# Patient Record
Sex: MALE | Race: WHITE | Employment: OTHER | URBAN - NONMETROPOLITAN AREA
[De-identification: names, ages, dates, MRNs, and addresses within clinical notes are randomized per-mention and may not be internally consistent; named-entity substitution may affect disease eponyms.]

---

## 2017-01-25 ENCOUNTER — OFFICE VISIT (OUTPATIENT)
Dept: PRIMARY CARE CLINIC | Age: 72
End: 2017-01-25
Payer: MEDICARE

## 2017-01-25 VITALS
TEMPERATURE: 97.4 F | WEIGHT: 197.87 LBS | SYSTOLIC BLOOD PRESSURE: 136 MMHG | BODY MASS INDEX: 26.22 KG/M2 | HEIGHT: 73 IN | OXYGEN SATURATION: 97 % | DIASTOLIC BLOOD PRESSURE: 86 MMHG | HEART RATE: 78 BPM

## 2017-01-25 DIAGNOSIS — M25.551 BILATERAL HIP PAIN: ICD-10-CM

## 2017-01-25 DIAGNOSIS — Z00.00 VISIT FOR PREVENTIVE HEALTH EXAMINATION: Primary | ICD-10-CM

## 2017-01-25 DIAGNOSIS — Z86.010 ENCOUNTER FOR COLONOSCOPY DUE TO HISTORY OF ADENOMATOUS COLONIC POLYPS: ICD-10-CM

## 2017-01-25 DIAGNOSIS — Z23 NEED FOR INFLUENZA VACCINATION: ICD-10-CM

## 2017-01-25 DIAGNOSIS — N40.1 BENIGN PROSTATIC HYPERPLASIA WITH LOWER URINARY TRACT SYMPTOMS, UNSPECIFIED MORPHOLOGY: ICD-10-CM

## 2017-01-25 DIAGNOSIS — Z23 NEED FOR SHINGLES VACCINE: ICD-10-CM

## 2017-01-25 DIAGNOSIS — Z11.59 NEED FOR HEPATITIS C SCREENING TEST: ICD-10-CM

## 2017-01-25 DIAGNOSIS — G89.29 CHRONIC MIDLINE LOW BACK PAIN WITHOUT SCIATICA: ICD-10-CM

## 2017-01-25 DIAGNOSIS — Z12.11 ENCOUNTER FOR COLONOSCOPY DUE TO HISTORY OF ADENOMATOUS COLONIC POLYPS: ICD-10-CM

## 2017-01-25 DIAGNOSIS — M25.552 BILATERAL HIP PAIN: ICD-10-CM

## 2017-01-25 DIAGNOSIS — M54.50 CHRONIC MIDLINE LOW BACK PAIN WITHOUT SCIATICA: ICD-10-CM

## 2017-01-25 DIAGNOSIS — M62.838 MUSCLE SPASM: ICD-10-CM

## 2017-01-25 DIAGNOSIS — Z23 NEED FOR PNEUMOCOCCAL VACCINATION: ICD-10-CM

## 2017-01-25 PROCEDURE — G0438 PPPS, INITIAL VISIT: HCPCS | Performed by: PEDIATRICS

## 2017-01-25 RX ORDER — OXYCODONE HYDROCHLORIDE AND ACETAMINOPHEN 5; 325 MG/1; MG/1
1 TABLET ORAL PRN
Status: ON HOLD | COMMUNITY
End: 2018-06-29 | Stop reason: HOSPADM

## 2017-01-25 RX ORDER — TAMSULOSIN HYDROCHLORIDE 0.4 MG/1
0.4 CAPSULE ORAL DAILY
Qty: 30 CAPSULE | Refills: 11 | Status: SHIPPED | OUTPATIENT
Start: 2017-01-25 | End: 2018-02-19 | Stop reason: SDUPTHER

## 2017-01-25 RX ORDER — CARISOPRODOL 350 MG/1
350 TABLET ORAL PRN
COMMUNITY
End: 2017-01-25 | Stop reason: SDUPTHER

## 2017-01-25 RX ORDER — TAMSULOSIN HYDROCHLORIDE 0.4 MG/1
0.4 CAPSULE ORAL PRN
COMMUNITY
End: 2017-01-25 | Stop reason: SDUPTHER

## 2017-01-25 RX ORDER — CARISOPRODOL 350 MG/1
350 TABLET ORAL 3 TIMES DAILY PRN
Qty: 90 TABLET | Refills: 0 | Status: SHIPPED | OUTPATIENT
Start: 2017-01-25 | End: 2018-03-16 | Stop reason: SDUPTHER

## 2017-01-25 ASSESSMENT — ENCOUNTER SYMPTOMS
DIARRHEA: 0
COUGH: 0
VOMITING: 0
CHEST TIGHTNESS: 0
VOICE CHANGE: 0
SORE THROAT: 0
ALLERGIC/IMMUNOLOGIC NEGATIVE: 1
ABDOMINAL PAIN: 0
RHINORRHEA: 0
BACK PAIN: 1
NAUSEA: 0
TROUBLE SWALLOWING: 0
SHORTNESS OF BREATH: 0
ROS SKIN COMMENTS: MOLE ON BACK
CONSTIPATION: 0
WHEEZING: 0
SINUS PRESSURE: 0

## 2017-01-26 DIAGNOSIS — Z11.59 NEED FOR HEPATITIS C SCREENING TEST: ICD-10-CM

## 2017-01-26 DIAGNOSIS — Z00.00 VISIT FOR PREVENTIVE HEALTH EXAMINATION: ICD-10-CM

## 2017-01-26 LAB
ALBUMIN SERPL-MCNC: 4.4 G/DL (ref 3.5–5.2)
ALP BLD-CCNC: 100 U/L (ref 40–130)
ALT SERPL-CCNC: 37 U/L (ref 5–41)
ANION GAP SERPL CALCULATED.3IONS-SCNC: 19 MMOL/L (ref 7–19)
AST SERPL-CCNC: 24 U/L (ref 5–40)
BASOPHILS ABSOLUTE: 0 K/UL (ref 0–0.2)
BASOPHILS RELATIVE PERCENT: 0.2 % (ref 0–1)
BILIRUB SERPL-MCNC: 0.5 MG/DL (ref 0.2–1.2)
BUN BLDV-MCNC: 18 MG/DL (ref 8–23)
CALCIUM SERPL-MCNC: 9.1 MG/DL (ref 8.8–10.2)
CHLORIDE BLD-SCNC: 101 MMOL/L (ref 98–111)
CHOLESTEROL, TOTAL: 269 MG/DL (ref 160–199)
CO2: 23 MMOL/L (ref 22–29)
CREAT SERPL-MCNC: 0.8 MG/DL (ref 0.5–1.2)
CREATININE URINE: 178.2 MG/DL (ref 4.2–622)
EOSINOPHILS ABSOLUTE: 0.4 K/UL (ref 0–0.6)
EOSINOPHILS RELATIVE PERCENT: 5.1 % (ref 0–5)
GFR NON-AFRICAN AMERICAN: >60
GLOBULIN: 2.7 G/DL
GLUCOSE BLD-MCNC: 142 MG/DL (ref 74–109)
HCT VFR BLD CALC: 49.1 % (ref 42–52)
HDLC SERPL-MCNC: 30 MG/DL (ref 55–121)
HEMOGLOBIN: 16.4 G/DL (ref 14–18)
LDL CHOLESTEROL CALCULATED: ABNORMAL MG/DL
LDL CHOLESTEROL DIRECT: 170 MG/DL
LYMPHOCYTES ABSOLUTE: 4.4 K/UL (ref 1.1–4.5)
LYMPHOCYTES RELATIVE PERCENT: 52.9 % (ref 20–40)
MCH RBC QN AUTO: 31.4 PG (ref 27–31)
MCHC RBC AUTO-ENTMCNC: 33.4 G/DL (ref 33–37)
MCV RBC AUTO: 93.9 FL (ref 80–94)
MICROALBUMIN UR-MCNC: <1.2 MG/DL (ref 0–19)
MICROALBUMIN/CREAT UR-RTO: NORMAL MG/G
MONOCYTES ABSOLUTE: 0.8 K/UL (ref 0–0.9)
MONOCYTES RELATIVE PERCENT: 10 % (ref 0–10)
NEUTROPHILS ABSOLUTE: 2.7 K/UL (ref 1.5–7.5)
NEUTROPHILS RELATIVE PERCENT: 31.8 % (ref 50–65)
PDW BLD-RTO: 14.3 % (ref 11.5–14.5)
PLATELET # BLD: 253 K/UL (ref 130–400)
PMV BLD AUTO: 11.4 FL (ref 7.4–10.4)
POTASSIUM SERPL-SCNC: 4.6 MMOL/L (ref 3.5–5)
RBC # BLD: 5.23 M/UL (ref 4.7–6.1)
SODIUM BLD-SCNC: 143 MMOL/L (ref 136–145)
T4 FREE: 1.3 NG/ML (ref 0.9–1.7)
TOTAL PROTEIN: 7.1 G/DL (ref 6.6–8.7)
TRIGL SERPL-MCNC: 410 MG/DL (ref 150–199)
TSH SERPL DL<=0.05 MIU/L-ACNC: 3.26 UIU/ML (ref 0.27–4.2)
WBC # BLD: 8.4 K/UL (ref 4.8–10.8)

## 2017-01-27 DIAGNOSIS — Z79.899 MEDICATION MANAGEMENT: ICD-10-CM

## 2017-01-27 DIAGNOSIS — E11.9 TYPE 2 DIABETES MELLITUS WITHOUT COMPLICATION, WITHOUT LONG-TERM CURRENT USE OF INSULIN (HCC): Primary | ICD-10-CM

## 2017-01-27 LAB — HEPATITIS C ANTIBODY INTERPRETATION: NORMAL

## 2017-01-28 RX ORDER — ATORVASTATIN CALCIUM 20 MG/1
20 TABLET, FILM COATED ORAL DAILY
Qty: 90 TABLET | Refills: 0 | Status: SHIPPED | OUTPATIENT
Start: 2017-01-28 | End: 2017-03-13

## 2017-01-30 DIAGNOSIS — E11.9 TYPE 2 DIABETES MELLITUS WITHOUT COMPLICATION, WITHOUT LONG-TERM CURRENT USE OF INSULIN (HCC): ICD-10-CM

## 2017-01-30 LAB — HBA1C MFR BLD: 6.2 %

## 2017-01-30 RX ORDER — ATORVASTATIN CALCIUM 20 MG/1
20 TABLET, FILM COATED ORAL DAILY
Qty: 30 TABLET | Refills: 11 | Status: SHIPPED | OUTPATIENT
Start: 2017-01-30 | End: 2017-12-12 | Stop reason: SDUPTHER

## 2017-03-08 ENCOUNTER — HOSPITAL ENCOUNTER (OUTPATIENT)
Dept: GENERAL RADIOLOGY | Age: 72
Discharge: HOME | End: 2017-03-08
Payer: MEDICARE

## 2017-03-08 DIAGNOSIS — M25.552 BILATERAL HIP PAIN: ICD-10-CM

## 2017-03-08 DIAGNOSIS — G89.29 CHRONIC MIDLINE LOW BACK PAIN WITHOUT SCIATICA: ICD-10-CM

## 2017-03-08 DIAGNOSIS — M54.50 CHRONIC MIDLINE LOW BACK PAIN WITHOUT SCIATICA: ICD-10-CM

## 2017-03-08 DIAGNOSIS — M25.551 BILATERAL HIP PAIN: ICD-10-CM

## 2017-03-08 DIAGNOSIS — Z79.899 MEDICATION MANAGEMENT: ICD-10-CM

## 2017-03-08 LAB
BASOPHILS ABSOLUTE: 0 K/UL (ref 0–0.2)
BASOPHILS RELATIVE PERCENT: 0.1 % (ref 0–1)
EOSINOPHILS ABSOLUTE: 0.3 K/UL (ref 0–0.6)
EOSINOPHILS RELATIVE PERCENT: 2.8 % (ref 0–5)
HCT VFR BLD CALC: 49.2 % (ref 42–52)
HEMOGLOBIN: 15.7 G/DL (ref 14–18)
LYMPHOCYTES ABSOLUTE: 4.5 K/UL (ref 1.1–4.5)
LYMPHOCYTES RELATIVE PERCENT: 49 % (ref 20–40)
MCH RBC QN AUTO: 31 PG (ref 27–31)
MCHC RBC AUTO-ENTMCNC: 31.9 G/DL (ref 33–37)
MCV RBC AUTO: 97.2 FL (ref 80–94)
MONOCYTES ABSOLUTE: 0.8 K/UL (ref 0–0.9)
MONOCYTES RELATIVE PERCENT: 8.5 % (ref 0–10)
NEUTROPHILS ABSOLUTE: 3.6 K/UL (ref 1.5–7.5)
NEUTROPHILS RELATIVE PERCENT: 39.4 % (ref 50–65)
PDW BLD-RTO: 14.3 % (ref 11.5–14.5)
PLATELET # BLD: 258 K/UL (ref 130–400)
PMV BLD AUTO: 12.6 FL (ref 7.4–10.4)
RBC # BLD: 5.06 M/UL (ref 4.7–6.1)
WBC # BLD: 9.3 K/UL (ref 4.8–10.8)

## 2017-03-08 PROCEDURE — 73521 X-RAY EXAM HIPS BI 2 VIEWS: CPT

## 2017-03-08 PROCEDURE — 72100 X-RAY EXAM L-S SPINE 2/3 VWS: CPT

## 2017-03-09 ENCOUNTER — TELEPHONE (OUTPATIENT)
Dept: PRIMARY CARE CLINIC | Age: 72
End: 2017-03-09

## 2017-03-13 ENCOUNTER — OFFICE VISIT (OUTPATIENT)
Dept: PRIMARY CARE CLINIC | Age: 72
End: 2017-03-13
Payer: MEDICARE

## 2017-03-13 VITALS
HEIGHT: 73 IN | BODY MASS INDEX: 26.21 KG/M2 | OXYGEN SATURATION: 98 % | SYSTOLIC BLOOD PRESSURE: 108 MMHG | TEMPERATURE: 96.3 F | WEIGHT: 197.8 LBS | DIASTOLIC BLOOD PRESSURE: 66 MMHG | HEART RATE: 65 BPM

## 2017-03-13 DIAGNOSIS — E11.9 TYPE 2 DIABETES MELLITUS WITHOUT COMPLICATION, WITHOUT LONG-TERM CURRENT USE OF INSULIN (HCC): ICD-10-CM

## 2017-03-13 DIAGNOSIS — E78.2 MIXED HYPERLIPIDEMIA: ICD-10-CM

## 2017-03-13 DIAGNOSIS — Z72.0 TOBACCO USE: ICD-10-CM

## 2017-03-13 DIAGNOSIS — M15.9 PRIMARY OSTEOARTHRITIS INVOLVING MULTIPLE JOINTS: ICD-10-CM

## 2017-03-13 DIAGNOSIS — I10 ESSENTIAL HYPERTENSION: Primary | ICD-10-CM

## 2017-03-13 PROCEDURE — 99214 OFFICE O/P EST MOD 30 MIN: CPT | Performed by: PEDIATRICS

## 2017-03-13 PROCEDURE — 4004F PT TOBACCO SCREEN RCVD TLK: CPT | Performed by: PEDIATRICS

## 2017-03-13 PROCEDURE — G8420 CALC BMI NORM PARAMETERS: HCPCS | Performed by: PEDIATRICS

## 2017-03-13 PROCEDURE — 1123F ACP DISCUSS/DSCN MKR DOCD: CPT | Performed by: PEDIATRICS

## 2017-03-13 PROCEDURE — 3044F HG A1C LEVEL LT 7.0%: CPT | Performed by: PEDIATRICS

## 2017-03-13 PROCEDURE — G8427 DOCREV CUR MEDS BY ELIG CLIN: HCPCS | Performed by: PEDIATRICS

## 2017-03-13 PROCEDURE — 4040F PNEUMOC VAC/ADMIN/RCVD: CPT | Performed by: PEDIATRICS

## 2017-03-13 PROCEDURE — G8484 FLU IMMUNIZE NO ADMIN: HCPCS | Performed by: PEDIATRICS

## 2017-03-13 PROCEDURE — 3017F COLORECTAL CA SCREEN DOC REV: CPT | Performed by: PEDIATRICS

## 2017-03-13 RX ORDER — MELOXICAM 15 MG/1
15 TABLET ORAL DAILY
Qty: 30 TABLET | Refills: 11 | Status: SHIPPED | OUTPATIENT
Start: 2017-03-13 | End: 2018-03-16 | Stop reason: SDUPTHER

## 2017-03-13 ASSESSMENT — ENCOUNTER SYMPTOMS
DIARRHEA: 0
COUGH: 0
SINUS PRESSURE: 0
SORE THROAT: 0
VOMITING: 0
ALLERGIC/IMMUNOLOGIC NEGATIVE: 1
BACK PAIN: 1
NAUSEA: 0
CONSTIPATION: 0
SHORTNESS OF BREATH: 0
RHINORRHEA: 0
TROUBLE SWALLOWING: 0
ABDOMINAL PAIN: 0
WHEEZING: 0
VOICE CHANGE: 0
CHEST TIGHTNESS: 0

## 2017-07-17 ENCOUNTER — TELEPHONE (OUTPATIENT)
Dept: PRIMARY CARE CLINIC | Age: 72
End: 2017-07-17

## 2017-07-17 DIAGNOSIS — I10 ESSENTIAL HYPERTENSION: ICD-10-CM

## 2017-07-17 DIAGNOSIS — R73.09 ELEVATED GLUCOSE: Primary | ICD-10-CM

## 2017-07-17 DIAGNOSIS — E11.9 TYPE 2 DIABETES MELLITUS WITHOUT COMPLICATION, WITHOUT LONG-TERM CURRENT USE OF INSULIN (HCC): ICD-10-CM

## 2017-07-17 DIAGNOSIS — E78.2 MIXED HYPERLIPIDEMIA: ICD-10-CM

## 2017-07-17 DIAGNOSIS — M62.838 MUSCLE SPASM: ICD-10-CM

## 2017-07-17 LAB
ALBUMIN SERPL-MCNC: 4.4 G/DL (ref 3.5–5.2)
ALP BLD-CCNC: 87 U/L (ref 40–130)
ALT SERPL-CCNC: 32 U/L (ref 5–41)
ANION GAP SERPL CALCULATED.3IONS-SCNC: 15 MMOL/L (ref 7–19)
AST SERPL-CCNC: 24 U/L (ref 5–40)
BASOPHILS ABSOLUTE: 0 K/UL (ref 0–0.2)
BASOPHILS RELATIVE PERCENT: 0.4 % (ref 0–1)
BILIRUB SERPL-MCNC: 0.4 MG/DL (ref 0.2–1.2)
BUN BLDV-MCNC: 19 MG/DL (ref 8–23)
CALCIUM SERPL-MCNC: 9.2 MG/DL (ref 8.8–10.2)
CHLORIDE BLD-SCNC: 105 MMOL/L (ref 98–111)
CHOLESTEROL, TOTAL: 141 MG/DL (ref 160–199)
CO2: 23 MMOL/L (ref 22–29)
CREAT SERPL-MCNC: 0.6 MG/DL (ref 0.5–1.2)
CREATININE URINE: 150.1 MG/DL (ref 4.2–622)
EOSINOPHILS ABSOLUTE: 0.3 K/UL (ref 0–0.6)
EOSINOPHILS RELATIVE PERCENT: 3.9 % (ref 0–5)
GFR NON-AFRICAN AMERICAN: >60
GLUCOSE BLD-MCNC: 118 MG/DL (ref 74–109)
HBA1C MFR BLD: 6.4 %
HCT VFR BLD CALC: 47.4 % (ref 42–52)
HDLC SERPL-MCNC: 44 MG/DL (ref 55–121)
HEMOGLOBIN: 15.5 G/DL (ref 14–18)
LDL CHOLESTEROL CALCULATED: 72 MG/DL
LYMPHOCYTES ABSOLUTE: 3.8 K/UL (ref 1.1–4.5)
LYMPHOCYTES RELATIVE PERCENT: 46.2 % (ref 20–40)
MCH RBC QN AUTO: 31.4 PG (ref 27–31)
MCHC RBC AUTO-ENTMCNC: 32.7 G/DL (ref 33–37)
MCV RBC AUTO: 96 FL (ref 80–94)
MICROALBUMIN UR-MCNC: <1.2 MG/DL (ref 0–19)
MICROALBUMIN/CREAT UR-RTO: NORMAL MG/G
MONOCYTES ABSOLUTE: 0.7 K/UL (ref 0–0.9)
MONOCYTES RELATIVE PERCENT: 8.2 % (ref 0–10)
NEUTROPHILS ABSOLUTE: 3.4 K/UL (ref 1.5–7.5)
NEUTROPHILS RELATIVE PERCENT: 40.9 % (ref 50–65)
PDW BLD-RTO: 13.9 % (ref 11.5–14.5)
PLATELET # BLD: 258 K/UL (ref 130–400)
PMV BLD AUTO: 11.5 FL (ref 9.4–12.4)
POTASSIUM SERPL-SCNC: 4.9 MMOL/L (ref 3.5–5)
RBC # BLD: 4.94 M/UL (ref 4.7–6.1)
SODIUM BLD-SCNC: 143 MMOL/L (ref 136–145)
TOTAL PROTEIN: 7.1 G/DL (ref 6.6–8.7)
TRIGL SERPL-MCNC: 124 MG/DL (ref 150–199)
WBC # BLD: 8.3 K/UL (ref 4.8–10.8)

## 2017-07-18 RX ORDER — CARISOPRODOL 350 MG/1
350 TABLET ORAL 3 TIMES DAILY PRN
Qty: 90 TABLET | Refills: 0 | Status: CANCELLED | OUTPATIENT
Start: 2017-07-18

## 2017-07-19 RX ORDER — TIZANIDINE 4 MG/1
4 TABLET ORAL EVERY 8 HOURS PRN
Qty: 90 TABLET | Refills: 1 | Status: SHIPPED | OUTPATIENT
Start: 2017-07-19 | End: 2018-03-16 | Stop reason: ALTCHOICE

## 2017-10-20 DIAGNOSIS — E78.2 MIXED HYPERLIPIDEMIA: ICD-10-CM

## 2017-10-20 DIAGNOSIS — R73.09 ELEVATED GLUCOSE: ICD-10-CM

## 2017-10-20 DIAGNOSIS — E11.9 TYPE 2 DIABETES MELLITUS WITHOUT COMPLICATION, WITHOUT LONG-TERM CURRENT USE OF INSULIN (HCC): ICD-10-CM

## 2017-10-20 DIAGNOSIS — I10 ESSENTIAL HYPERTENSION: ICD-10-CM

## 2017-10-20 LAB — HBA1C MFR BLD: 6.2 %

## 2017-10-23 ENCOUNTER — TELEPHONE (OUTPATIENT)
Dept: PRIMARY CARE CLINIC | Age: 72
End: 2017-10-23

## 2017-10-24 LAB
FOLATE: >20 NG/ML (ref 4.5–32.2)
VITAMIN B-12: 698 PG/ML (ref 211–946)

## 2017-10-25 ENCOUNTER — TELEPHONE (OUTPATIENT)
Dept: PRIMARY CARE CLINIC | Age: 72
End: 2017-10-25

## 2017-12-12 RX ORDER — ATORVASTATIN CALCIUM 20 MG/1
20 TABLET, FILM COATED ORAL DAILY
Qty: 90 TABLET | Refills: 3 | Status: SHIPPED | OUTPATIENT
Start: 2017-12-12 | End: 2018-03-16 | Stop reason: SDUPTHER

## 2017-12-12 NOTE — TELEPHONE ENCOUNTER
Danny fair 3/13/17      Requested Prescriptions     Pending Prescriptions Disp Refills    atorvastatin (LIPITOR) 20 MG tablet [Pharmacy Med Name: ATORVASTATIN 20MG TABLETS] 90 tablet 3     Sig: Take 1 tablet by mouth daily

## 2018-01-23 DIAGNOSIS — R73.9 ELEVATED BLOOD SUGAR: Primary | ICD-10-CM

## 2018-01-23 DIAGNOSIS — R73.9 ELEVATED BLOOD SUGAR: ICD-10-CM

## 2018-01-23 LAB — HBA1C MFR BLD: 5.5 %

## 2018-01-24 ENCOUNTER — TELEPHONE (OUTPATIENT)
Dept: PRIMARY CARE CLINIC | Age: 73
End: 2018-01-24

## 2018-01-24 DIAGNOSIS — Z12.5 PROSTATE CANCER SCREENING: ICD-10-CM

## 2018-01-24 DIAGNOSIS — E78.2 MIXED HYPERLIPIDEMIA: ICD-10-CM

## 2018-01-24 DIAGNOSIS — I10 ESSENTIAL HYPERTENSION: Primary | ICD-10-CM

## 2018-01-24 DIAGNOSIS — N40.1 BENIGN PROSTATIC HYPERPLASIA WITH LOWER URINARY TRACT SYMPTOMS, SYMPTOM DETAILS UNSPECIFIED: ICD-10-CM

## 2018-01-26 DIAGNOSIS — N40.1 BENIGN PROSTATIC HYPERPLASIA WITH LOWER URINARY TRACT SYMPTOMS, SYMPTOM DETAILS UNSPECIFIED: ICD-10-CM

## 2018-01-26 DIAGNOSIS — I10 ESSENTIAL HYPERTENSION: ICD-10-CM

## 2018-01-26 DIAGNOSIS — Z12.5 PROSTATE CANCER SCREENING: ICD-10-CM

## 2018-01-26 DIAGNOSIS — E78.2 MIXED HYPERLIPIDEMIA: ICD-10-CM

## 2018-01-26 LAB
ALBUMIN SERPL-MCNC: 4.6 G/DL (ref 3.5–5.2)
ALP BLD-CCNC: 85 U/L (ref 40–130)
ALT SERPL-CCNC: 33 U/L (ref 5–41)
ANION GAP SERPL CALCULATED.3IONS-SCNC: 15 MMOL/L (ref 7–19)
AST SERPL-CCNC: 25 U/L (ref 5–40)
BILIRUB SERPL-MCNC: 0.5 MG/DL (ref 0.2–1.2)
BUN BLDV-MCNC: 16 MG/DL (ref 8–23)
CALCIUM SERPL-MCNC: 9.3 MG/DL (ref 8.8–10.2)
CHLORIDE BLD-SCNC: 97 MMOL/L (ref 98–111)
CHOLESTEROL, TOTAL: 135 MG/DL (ref 160–199)
CO2: 27 MMOL/L (ref 22–29)
CREAT SERPL-MCNC: 0.8 MG/DL (ref 0.5–1.2)
GFR NON-AFRICAN AMERICAN: >60
GLUCOSE BLD-MCNC: 90 MG/DL (ref 74–109)
HDLC SERPL-MCNC: 45 MG/DL (ref 55–121)
LDL CHOLESTEROL CALCULATED: 59 MG/DL
POTASSIUM SERPL-SCNC: 4.6 MMOL/L (ref 3.5–5)
PROSTATE SPECIFIC ANTIGEN: 2.28 NG/ML (ref 0–4)
SODIUM BLD-SCNC: 139 MMOL/L (ref 136–145)
TOTAL PROTEIN: 6.9 G/DL (ref 6.6–8.7)
TRIGL SERPL-MCNC: 153 MG/DL (ref 0–149)

## 2018-01-26 NOTE — TELEPHONE ENCOUNTER
Pt called to get his other lab results. I explained only an A1C was drawn. He said all labs should have been drawn. I called lab and they are checking to see what all they can run with the blood they still have. I went ahead and put orders in. Margarita Garcia will call us and let us know. Pt is aware we are waiting to hear back from them. Cmp, lipid, psa can be done per [facility]. They did not receive a urine (even though pt gave one) The CBC was hemolized and it is not usable after 24 hours. Pt notified. He wants to know when you want to recheck his A1C please.  It was 5.5

## 2018-02-15 RX ORDER — TAMSULOSIN HYDROCHLORIDE 0.4 MG/1
0.4 CAPSULE ORAL DAILY
Qty: 30 CAPSULE | Refills: 11 | OUTPATIENT
Start: 2018-02-15

## 2018-02-19 RX ORDER — TAMSULOSIN HYDROCHLORIDE 0.4 MG/1
0.4 CAPSULE ORAL DAILY
Qty: 30 CAPSULE | Refills: 0 | Status: SHIPPED | OUTPATIENT
Start: 2018-02-19 | End: 2018-02-19 | Stop reason: SDUPTHER

## 2018-02-20 RX ORDER — TAMSULOSIN HYDROCHLORIDE 0.4 MG/1
0.4 CAPSULE ORAL DAILY
Qty: 90 CAPSULE | Refills: 3 | Status: SHIPPED | OUTPATIENT
Start: 2018-02-20 | End: 2018-03-16 | Stop reason: SDUPTHER

## 2018-03-16 ENCOUNTER — OFFICE VISIT (OUTPATIENT)
Dept: PRIMARY CARE CLINIC | Age: 73
End: 2018-03-16
Payer: MEDICARE

## 2018-03-16 VITALS
HEART RATE: 78 BPM | SYSTOLIC BLOOD PRESSURE: 110 MMHG | TEMPERATURE: 98 F | WEIGHT: 182 LBS | DIASTOLIC BLOOD PRESSURE: 70 MMHG | HEIGHT: 73 IN | OXYGEN SATURATION: 98 % | BODY MASS INDEX: 24.12 KG/M2

## 2018-03-16 DIAGNOSIS — M15.9 PRIMARY OSTEOARTHRITIS INVOLVING MULTIPLE JOINTS: ICD-10-CM

## 2018-03-16 DIAGNOSIS — Z13.6 SCREENING FOR ABDOMINAL AORTIC ANEURYSM: ICD-10-CM

## 2018-03-16 DIAGNOSIS — E78.2 MIXED HYPERLIPIDEMIA: ICD-10-CM

## 2018-03-16 DIAGNOSIS — Z12.11 COLON CANCER SCREENING: ICD-10-CM

## 2018-03-16 DIAGNOSIS — Z00.00 VISIT FOR PREVENTIVE HEALTH EXAMINATION: Primary | ICD-10-CM

## 2018-03-16 DIAGNOSIS — Z23 NEED FOR PNEUMOCOCCAL VACCINATION: ICD-10-CM

## 2018-03-16 DIAGNOSIS — Z23 NEED FOR TETANUS BOOSTER: ICD-10-CM

## 2018-03-16 DIAGNOSIS — N40.1 BENIGN PROSTATIC HYPERPLASIA WITH LOWER URINARY TRACT SYMPTOMS, SYMPTOM DETAILS UNSPECIFIED: ICD-10-CM

## 2018-03-16 DIAGNOSIS — M62.838 MUSCLE SPASM: ICD-10-CM

## 2018-03-16 DIAGNOSIS — Z72.0 TOBACCO USE: ICD-10-CM

## 2018-03-16 DIAGNOSIS — Z23 NEED FOR SHINGLES VACCINE: ICD-10-CM

## 2018-03-16 DIAGNOSIS — E11.9 TYPE 2 DIABETES MELLITUS WITHOUT COMPLICATION, WITHOUT LONG-TERM CURRENT USE OF INSULIN (HCC): ICD-10-CM

## 2018-03-16 DIAGNOSIS — I10 ESSENTIAL HYPERTENSION: ICD-10-CM

## 2018-03-16 PROCEDURE — 99214 OFFICE O/P EST MOD 30 MIN: CPT | Performed by: PEDIATRICS

## 2018-03-16 PROCEDURE — 3044F HG A1C LEVEL LT 7.0%: CPT | Performed by: PEDIATRICS

## 2018-03-16 PROCEDURE — G0439 PPPS, SUBSEQ VISIT: HCPCS | Performed by: PEDIATRICS

## 2018-03-16 RX ORDER — CARISOPRODOL 350 MG/1
350 TABLET ORAL 3 TIMES DAILY PRN
Qty: 30 TABLET | Refills: 0 | Status: SHIPPED | OUTPATIENT
Start: 2018-03-16 | End: 2018-04-15

## 2018-03-16 RX ORDER — MELOXICAM 15 MG/1
15 TABLET ORAL DAILY
Qty: 30 TABLET | Refills: 11 | Status: ON HOLD | OUTPATIENT
Start: 2018-03-16 | End: 2018-06-29 | Stop reason: HOSPADM

## 2018-03-16 RX ORDER — CARISOPRODOL 350 MG/1
350 TABLET ORAL 3 TIMES DAILY PRN
Qty: 30 TABLET | Refills: 0 | Status: SHIPPED | OUTPATIENT
Start: 2018-03-16 | End: 2018-03-16 | Stop reason: SDUPTHER

## 2018-03-16 RX ORDER — TAMSULOSIN HYDROCHLORIDE 0.4 MG/1
0.4 CAPSULE ORAL DAILY
Qty: 30 CAPSULE | Refills: 11 | Status: SHIPPED | OUTPATIENT
Start: 2018-03-16 | End: 2018-03-16 | Stop reason: SDUPTHER

## 2018-03-16 RX ORDER — TAMSULOSIN HYDROCHLORIDE 0.4 MG/1
CAPSULE ORAL
Qty: 90 CAPSULE | Refills: 3 | Status: SHIPPED | OUTPATIENT
Start: 2018-03-16 | End: 2018-11-01 | Stop reason: SDUPTHER

## 2018-03-16 RX ORDER — ATORVASTATIN CALCIUM 20 MG/1
20 TABLET, FILM COATED ORAL DAILY
Qty: 30 TABLET | Refills: 11 | Status: SHIPPED | OUTPATIENT
Start: 2018-03-16 | End: 2018-03-16 | Stop reason: SDUPTHER

## 2018-03-16 RX ORDER — ATORVASTATIN CALCIUM 20 MG/1
TABLET, FILM COATED ORAL
Qty: 90 TABLET | Refills: 3 | Status: ON HOLD | OUTPATIENT
Start: 2018-03-16 | End: 2018-08-13 | Stop reason: SDUPTHER

## 2018-03-16 ASSESSMENT — ENCOUNTER SYMPTOMS
ALLERGIC/IMMUNOLOGIC NEGATIVE: 1
COUGH: 0
DIARRHEA: 0
SHORTNESS OF BREATH: 0
VOICE CHANGE: 0
CHEST TIGHTNESS: 0
SINUS PRESSURE: 0
CONSTIPATION: 0
SORE THROAT: 0
ABDOMINAL PAIN: 0
BACK PAIN: 1
WHEEZING: 0
NAUSEA: 0
VOMITING: 0
RHINORRHEA: 0
TROUBLE SWALLOWING: 0

## 2018-03-16 ASSESSMENT — ANXIETY QUESTIONNAIRES: GAD7 TOTAL SCORE: 0

## 2018-03-16 ASSESSMENT — LIFESTYLE VARIABLES: HOW OFTEN DO YOU HAVE A DRINK CONTAINING ALCOHOL: 0

## 2018-03-16 ASSESSMENT — PATIENT HEALTH QUESTIONNAIRE - PHQ9: SUM OF ALL RESPONSES TO PHQ QUESTIONS 1-9: 0

## 2018-03-16 NOTE — PROGRESS NOTES
you exercise for at least 20 minutes 2-3 times per week?: Yes  Have you lost any weight without trying in the past 3 months?: No  Do you eat fewer than 2 meals per day?: No  Have you seen a dentist within the past year?: (!) No  Body mass index is 24.01 kg/m². Health Habits/Nutrition Interventions:  · Dental exam overdue:  patient encouraged to make appointment with his/her dentist    Hearing/Vision  Do you or your family notice any trouble with your hearing?: (!) Yes  Do you have difficulty driving, watching TV, or doing any of your daily activities because of your eyesight?: (!) Yes  Have you had an eye exam within the past year?: (!) No  Hearing/Vision Interventions:  · Hearing concerns: Additional information was provided  · Vision concerns:  patient encouraged to make appointment with his/her eye specialist, Additional information was provided    Safety  Do you have working smoke detectors?: Yes  Have all throw rugs been removed or fastened?: Yes  Do you have non-slip mats in all bathtubs?: (!) No  Do all of your stairways have a railing or banister?: Yes  Are your doorways, halls and stairs free of clutter?: Yes  Do you always fasten your seatbelt when you are in a car?: (!) No  Safety Interventions:  · Home safety tips provided  · Vehicle safety tips were provided    ADLs  In the past 7 days, did you need help from others to perform any of the following everyday activities?: None  In the past 7 days, did you need help from others to take care of any of the following?: None  ADL Interventions:  · None indicated    Personalized Preventive Plan   Current Health Maintenance Status    There is no immunization history on file for this patient.      Health Maintenance   Topic Date Due    AAA screen  1945    DTaP/Tdap/Td vaccine (1 - Tdap) 07/15/1966    Shingles Vaccine (1 of 2 - 2 Dose Series) 07/15/1997    Colon cancer screen colonoscopy  07/15/1997    Pneumococcal low/med risk (1 of 2 - PCV13)

## 2018-03-16 NOTE — TELEPHONE ENCOUNTER
Patient last seen 3/16/18  Requests a 90 day supply  Requested Prescriptions     Pending Prescriptions Disp Refills    tamsulosin (FLOMAX) 0.4 MG capsule [Pharmacy Med Name: TAMSULOSIN 0.4MG CAPSULES] 90 capsule 3     Sig: TAKE 1 CAPSULE BY MOUTH EVERY DAY    atorvastatin (LIPITOR) 20 MG tablet [Pharmacy Med Name: ATORVASTATIN 20MG TABLETS] 90 tablet 3     Sig: TAKE 1 TABLET BY MOUTH EVERY DAY

## 2018-03-16 NOTE — PATIENT INSTRUCTIONS
this test. Some experts recommend that you discuss the benefits and risks of the test with your doctor. · Abdominal aortic aneurysm. Ask your doctor whether you should have a test to check for an aneurysm. You may need a test if you ever smoked or if your parent, brother, sister, or child has had an aneurysm. When should you call for help? Watch closely for changes in your health, and be sure to contact your doctor if you have any problems or symptoms that concern you. Where can you learn more? Go to https://yoone.[employer]link [employer]). org and sign in to your Zaz[employer] account. Enter J054 in the Opt[employer]ron box to learn more about \"Well Visit, Over 65: Care Instructions. \"     If you do not have an account, please click on the \"Sign Up Now\" link. Current as of: May 12, 2017  Content Version: 11.5  © 2986-0603 [employer]. Care instructions adapted under license by [facility] ([facility]). If you have questions about a medical condition or this instruction, always ask your healthcare professional. [address] any warranty or liability for your use of this information. Patient Education        Learning About Living [place]  What is a living will? A living will is a legal form you use to write down the kind of care you want at the end of your life. It is used by the health professionals who will treat you if you aren't able to decide for yourself. If you put your wishes in writing, your loved ones and others will know what kind of care you want. They won't need to guess. This can ease your mind and be helpful to others. A living will is not the same as an estate or property will. An estate will explains what you want to happen with your money and property after you die. Is a living will a legal document? A living will is a legal document. Each state has its own laws about living [place].  If you move to another state, make sure that your living will is legal in the are two main types of advance directives. You can change them any time that your wishes change. · A living will tells your family and your doctor your wishes about life support and other treatment. · A durable power of  for health care lets you name a person to make treatment decisions for you when you can't speak for yourself. This person is called a health care agent. If you do not have an advance directive, decisions about your medical care may be made by a doctor or a  who doesn't know you. It may help to think of an advance directive as a gift to the people who care for you. If you have one, they won't have to make tough decisions by themselves. Follow-up care is a key part of your treatment and safety. Be sure to make and go to all appointments, and call your doctor if you are having problems. It's also a good idea to know your test results and keep a list of the medicines you take. How can you care for yourself at home? · Discuss your wishes with your loved ones and your doctor. This way, there are no surprises. · Many states have a unique form. Or you might use a universal form that has been approved by many states. This kind of form can sometimes be completed and stored online. Your electronic copy will then be available wherever you have a connection to the Internet. In most cases, doctors will respect your wishes even if you have a form from a different state. · You don't need a  to do an advance directive. But you may want to get legal advice. · Think about these questions when you prepare an advance directive:  ¨ Who do you want to make decisions about your medical care if you are not able to? Many people choose a family member or close friend. ¨ Do you know enough about life support methods that might be used? If not, talk to your doctor so you understand. ¨ What are you most afraid of that might happen?  You might be afraid of having pain, losing your independence, or help with dental care? Normal dental care  To keep the teeth and gums healthy:  · Brush the teeth with fluoride toothpaste twice a day-in the morning and at night-and floss at least once a day. Plaque can quickly build up on the teeth of older adults. · Watch for the signs of gum disease. These signs include gums that bleed after brushing or after eating hard foods, such as apples. · See a dentist regularly. Many experts recommend checkups every 6 months. · Keep the dentist up to date on any new medications the person is taking. · Encourage a balanced diet that includes whole grains, vegetables, and fruits, and that is low in saturated fat and sodium. · Encourage the person you're caring for not to use tobacco products. They can affect dental and general health. Many older adults have a fixed income and feel that they can't afford dental care. But most towns and [facility] have programs in which dentists help older adults by lowering fees. Contact your area's public health offices or  for information about dental care in your area. Using a toothbrush  Older adults with arthritis sometimes have trouble brushing their teeth because they can't easily hold the toothbrush. Their hands and fingers may be stiff, painful, or weak. If this is the case, you can:  · Offer an electric toothbrush. · Enlarge the handle of a non-electric toothbrush by wrapping a sponge, an elastic bandage, or adhesive tape around it. · Push the toothbrush handle through a ball made of rubber or soft foam.  · Make the handle longer and thicker by taping Popsicle sticks or tongue depressors to it. You may also be able to buy special toothbrushes, toothpaste dispensers, and floss holders. Your doctor may recommend a soft-bristle toothbrush if the person you care for bleeds easily. Bleeding can happen because of a health problem or from certain medicines.   A toothpaste for sensitive teeth may help if the person you care for has check how well you can hear. There are many types of hearing tests. If your doctor thinks that you might have hearing loss, he or she may refer you to a hearing specialist (audiologist) to do hearing tests. Why are these tests done? You may have hearing tests because you think you have hearing loss or you have ringing in your ears. Your doctor might want to find the type of hearing loss you have and see how bad it is. How can you prepare for these tests? · Avoid loud noises for 16 hours before these tests. What happens before these tests? Tell your doctor if:  · You have recently been exposed to any painfully loud noise or to a noise that made your ears ring. · You are often exposed to loud noises. · You are taking or have taken antibiotics that can damage hearing, such as gentamicin. · You have had any problems hearing normal conversations or noticed any other signs of possible hearing loss. · You have recently had a cold or ear infection. · You have family members who have hearing loss. Before beginning any hearing tests, your doctor may check your ear canals for earwax and remove any hardened wax. The wax can interfere with how well you hear the tones or words used during testing. Some tests require headphones. For these tests, you will need to remove eyeglasses, earrings, or hair clips that may get in the way of the headphones. You may have a thin plastic tube placed in your ear canal to keep it open. The headphones are then placed on your head and adjusted to fit. If you are wearing a hearing aid, your doctor may ask you to remove it for some of the tests. What happens during these tests? Tuning fork tests  Tuning fork tests check how well sound moves through your ear. Your doctor strikes the tuning fork to make it vibrate and produce a tone. Sometimes the tuning fork will be placed on your head or behind your ear.  Depending on how you hear the sound, your doctor can tell if there is a problem are some general tips for how to lower the chance of getting injured in the home. · Pad sharp corners on furniture and counter tops. · Keep objects that are used often within easy reach. · Use guardrails on the side of the bed. The rails can help a person get out of bed. They also can prevent falls from the bed. · Install handrails around the toilet and in the shower. Use a tub mat to prevent slipping. · Use a shower chair or bath bench when the person bathes. · Provide good lighting. Put night-lights in bedrooms, hallways, and bathrooms. · Have a first aid kit. It is also important to be aware of safe temperatures in the home. When helping someone bathe, use the back of your hand to test the water to make sure it's not too hot. Lower the temperature setting in the hot water heater to 120°F or lower to avoid burns. And make sure other liquids (such as coffee, tea, or soup) are not too hot. How can you protect from fire and carbon monoxide? Home safety alarms are very important. A smoke alarm can detect small amounts of smoke. This can allow time to escape from a fire. And a carbon monoxide alarm can let people know about this deadly gas before it starts to make them sick. · Put smoke alarms:  ¨ On each level of the home, in the hallway outside sleeping areas, and inside each bedroom. ¨ In the center of a ceiling, or on a wall 6 to 12 inches from the ceiling. This is where smoke goes first. Avoid places near doors, windows, or air ducts. · Put a carbon monoxide alarm in the hallway outside of the bedrooms in each sleeping area of the house. The alarm should be placed high on the wall. Make sure that the alarm can't be covered up by furniture or drapes. · Test alarms regularly by pressing the test button. · Replace non-lithium batteries in alarms twice a year. · Have a plan for getting out of the home if there is a fire. Practice by having a fire drill. · Keep a fire extinguisher in the kitchen.   What can you do to prevent falls? You can help prevent falls by keeping rooms uncluttered, with clear walkways around furniture. Keep electrical cords off the floor, and remove throw rugs to prevent tripping. If there are steps in the home, make sure they all have handrails. Don't leave items on the steps, and be sure to fix any that are loose, broken, or uneven. How can you increase safety for people with dementia? If you are caring for someone who has dementia, you may need to make some extra changes to create a safe home. People with dementia have a loss of mental skills, such as memory, problem solving, and learning. So things that might not have been a danger to them before can cause safety problems now. Here are some things to consider:  · Don't move furniture around. The person may become confused. · Use locks on doors and cupboards. Lock up knives, scissors, medicines, cleaning supplies, and other dangerous items. · Use hidden switches or controls for the stove, thermostat, water heater, and other appliances. · If your loved one is still cooking, think about whether that is safe. It may be okay with some help, depending on your loved one's condition. But for people who have memory or thinking problems, it's best to avoid any activities that might not be safe. · If the person tends to wander or to try to leave the home, install motion-sensor lights on all doors and windows. · Have emergency numbers in a central area near a phone. Include 911 and numbers for the doctor and family members. · Get medical alert jewelry for the person so you can be contacted if he or she wanders away. If possible, provide a safe place for wandering, such as an enclosed yard or garden. Where can you learn more? Go to https://chpierreeb.[employer]. org and sign in to your Hokey Pokey account. Enter Q463 in the [facility] box to learn more about \"Learning About How to Make a Home Safe. \"     If you do not have

## 2018-03-16 NOTE — PROGRESS NOTES
hyperlipidemia E78.2 272.2 atorvastatin (LIPITOR) 20 MG tablet   5. Primary osteoarthritis involving multiple joints M15.0 715.09 meloxicam (MOBIC) 15 MG tablet   6. Muscle spasm M62.838 728.85 carisoprodol (SOMA) 350 MG tablet   7. Tobacco use Z72.0 305.1 Strongly recommended that he quit. 8. Benign prostatic hyperplasia with lower urinary tract symptoms, symptom details unspecified N40.1 600.01 tamsulosin (FLOMAX) 0.4 MG capsule   9. Screening for abdominal aortic aneurysm Z13.6 V81.2 US SCREENING FOR AAA   10. Need for pneumococcal vaccination Z23 V03.82 declined   11. Need for shingles vaccine Z23 V04.89 Advised to go to health dept   12. Need for tetanus booster Z23 V03.7 Health dept   13. Colon cancer screening Z12.11 V76.51 He had this at [facility] 3 yrs ago       Orders Placed This Encounter   Procedures    US SCREENING FOR AAA    Hemoglobin A1C        No Follow-up on file. Patient Instructions       Patient Education        Well Visit, Over 72: Care Instructions  Your Care Instructions    Physical exams can help you stay healthy. Your doctor has checked your overall health and may have suggested ways to take good care of yourself. He or she also may have recommended tests. At home, you can help prevent illness with healthy eating, regular exercise, and other steps. Follow-up care is a key part of your treatment and safety. Be sure to make and go to all appointments, and call your doctor if you are having problems. It's also a good idea to know your test results and keep a list of the medicines you take. How can you care for yourself at home? · Reach and stay at a healthy weight. This will lower your risk for many problems, such as obesity, diabetes, heart disease, and high blood pressure. · Get at least 30 minutes of exercise on most days of the week. Walking is a good choice. You also may want to do other activities, such as running, swimming, cycling, or playing tennis or team sports.   · Do not professional. [address] any warranty or liability for your use of this information. Patient Education        Learning About Living Estela Bolivar  What is a living will? A living will is a legal form you use to write down the kind of care you want at the end of your life. It is used by the health professionals who will treat you if you aren't able to decide for yourself. If you put your wishes in writing, your loved ones and others will know what kind of care you want. They won't need to guess. This can ease your mind and be helpful to others. A living will is not the same as an estate or property will. An estate will explains what you want to happen with your money and property after you die. Is a living will a legal document? A living will is a legal document. Each state has its own laws about living [place]. If you move to another state, make sure that your living will is legal in the state where you now live. Or you might use a universal form that has been approved by many states. This kind of form can sometimes be completed and stored online. Your electronic copy will then be available wherever you have a connection to the Internet. In most cases, doctors will respect your wishes even if you have a form from a different state. · You don't need an  to complete a living will. But legal advice can be helpful if your state's laws are unclear, your health history is complicated, or your family can't agree on what should be in your living will. · You can change your living will at any time. Some people find that their wishes about end-of-life care change as their health changes. · In addition to making a living will, think about completing a medical power of  form. This form lets you name the person you want to make end-of-life treatment decisions for you (your \"health care agent\") if you're not able to.  Many hospitals and nursing homes will give you the forms you need to easily found. Where can you learn more? Go to https://chpepiceweb."rFactr, Inc.". org and sign in to your Simio account. Enter S153 in the Vivity Labs box to learn more about \"Learning About Living Joslyn. \"     If you do not have an account, please click on the \"Sign Up Now\" link. Current as of: September 24, 2016  Content Version: 11.5  © 6902-4126 Comenta.TV (Wayin). Care instructions adapted under license by [facility] ([facility]). If you have questions about a medical condition or this instruction, always ask your healthcare professional. [place] [address] any warranty or liability for your use of this information. Patient Education        Advance Directives: Care Instructions  Your Care Instructions  An advance directive is a legal way to state your wishes at the end of your life. It tells your family and your doctor what to do if you can no longer say what you want. There are two main types of advance directives. You can change them any time that your wishes change. · A living will tells your family and your doctor your wishes about life support and other treatment. · A durable power of  for health care lets you name a person to make treatment decisions for you when you can't speak for yourself. This person is called a health care agent. If you do not have an advance directive, decisions about your medical care may be made by a doctor or a  who doesn't know you. It may help to think of an advance directive as a gift to the people who care for you. If you have one, they won't have to make tough decisions by themselves. Follow-up care is a key part of your treatment and safety. Be sure to make and go to all appointments, and call your doctor if you are having problems. It's also a good idea to know your test results and keep a list of the medicines you take. How can you care for yourself at home? · Discuss your wishes with your loved ones and your doctor. condition or this instruction, always ask your healthcare professional. [place] [address] any warranty or liability for your use of this information. Patient Education        Learning About Dental Care for Older Adults  Dental care for older people is much the same as for younger adults. But older adults do have concerns that younger adults do not. Older adults may have problems with gum disease and decay on the roots of their teeth. They may need missing teeth replaced or broken fillings fixed. Or they may have dentures that need to be cared for. Some older adults may have trouble holding a toothbrush. You can help remind the person you are caring for to brush and floss their teeth or to clean their dentures. In some cases, you may need to do the brushing and other dental care tasks. People who have trouble using their hands or who have dementia may need this extra help. How can you help with dental care? Normal dental care  To keep the teeth and gums healthy:  · Brush the teeth with fluoride toothpaste twice a day-in the morning and at night-and floss at least once a day. Plaque can quickly build up on the teeth of older adults. · Watch for the signs of gum disease. These signs include gums that bleed after brushing or after eating hard foods, such as apples. · See a dentist regularly. Many experts recommend checkups every 6 months. · Keep the dentist up to date on any new medications the person is taking. · Encourage a balanced diet that includes whole grains, vegetables, and fruits, and that is low in saturated fat and sodium. · Encourage the person you're caring for not to use tobacco products. They can affect dental and general health. Many older adults have a fixed income and feel that they can't afford dental care. But most [facility] and [facility] have programs in which dentists help older adults by lowering fees.  Contact your area's public health offices or  for plaque. After you've finished flossing, throw away the used floss and begin brushing:  · Wet the brush and apply toothpaste. · Place the brush at a 45-degree angle where the teeth meet the gums. Press firmly, and move the brush in small circles over the surface of the teeth. · Be careful not to brush too hard. Vigorous brushing can make the gums pull away from the teeth and can scratch the tooth enamel. · Brush all surfaces of the teeth, on the tongue side and on the cheek side. Pay special attention to the front teeth and all surfaces of the back teeth. · Brush chewing surfaces with short back-and-forth strokes. After you've finished, help the person rinse the remaining toothpaste from their mouth. Where can you learn more? Go to https://Civiconanny.Numbrs AG. org and sign in to your Thinkature account. Enter N141 in the CNS Response box to learn more about \"Learning About Dental Care for Older Adults. \"     If you do not have an account, please click on the \"Sign Up Now\" link. Current as of: September 24, 2016  Content Version: 11.5  © 7741-7081 PlateJoy. Care instructions adapted under license by [facility] ([facility]). If you have questions about a medical condition or this instruction, always ask your healthcare professional. [place] [address] any warranty or liability for your use of this information. Patient Education        Hearing Loss: Care Instructions  Your Care Instructions    Hearing loss is a sudden or slow decrease in how well you hear. It can range from mild to severe. Permanent hearing loss can occur with aging. It also can happen when you are exposed long-term to loud noise. Examples include listening to loud music, riding motorcycles, or being around other loud machines. Hearing loss can affect your work and home life. It can make you feel lonely or depressed. You may feel that you have lost your independence.  But hearing aids and other devices can help you hear better and feel connected to others. Follow-up care is a key part of your treatment and safety. Be sure to make and go to all appointments, and call your doctor if you are having problems. It's also a good idea to know your test results and keep a list of the medicines you take. How can you care for yourself at home? · Avoid loud noises whenever possible. This helps keep your hearing from getting worse. · Always wear hearing protection around loud noises. · Wear a hearing aid as directed. See a person who can help you pick a hearing aid that fits you. · Have hearing tests as your doctor suggests. They can show whether your hearing has changed. Your hearing aid may need to be adjusted. · Use other devices as needed. These may include:  ¨ Telephone amplifiers and hearing aids that can connect to a television, stereo, radio, or microphone. ¨ Devices that use lights or vibrations. These alert you to the doorbell, a ringing telephone, or a baby monitor. ¨ Television closed-captioning. This shows the words at the bottom of the screen. Most new TVs can do this. Lashell Gallop (text telephone). This lets you type messages back and forth on the telephone instead of talking or listening. These devices are also called TDD. When messages are typed on the keyboard, they are sent over the phone line to a receiving TTY. The message is shown on a monitor. · Use pagers, fax machines, and email if it is hard for you to communicate by telephone. · Try to learn a listening technique called speech-reading. It is not lip-reading. You pay attention to people's gestures, expressions, posture, and tone of voice. These clues can help you understand what a person is saying. Face the person you are talking to, and have him or her face you. Make sure the lighting is good. You need to see the other person's face clearly. · Think about counseling if you need help to adjust to your hearing loss.   When should you call for grows.  · An [place]the[place] [facility]) hearing aid fits into the ear canal. ITC hearing aids are used by people with mild to moderate hearing loss. They are made to fit the shape and the size of your ear canal. They can be damaged by earwax and fluid draining from the ear. Their small size may be hard for some people to handle. They are not made for children. · With a bone-anchored hearing system, the sound processor sits behind the ear. No part of the system is within the ear itself. If your doctor thinks that you have hearing loss, you will be referred to an audiologist to do hearing tests. He or she can help you decide what type and style of hearing aid may be best for you. What else should I know about hearing aids? Find out if your insurance covers hearing aids. They can be expensive. Different types of hearing aids come with different costs. Also find out about a warranty or return policy in case you are not happy with your hearing aids. Follow-up care is a key part of your treatment and safety. Be sure to make and go to all appointments, and call your doctor if you are having problems. It's also a good idea to know your test results and keep a list of the medicines you take. Where can you learn more? Go to https://TapImmune.Wannado. org and sign in to your Hivext Technologies account. Enter C181 in the HiMom box to learn more about \"Learning About Hearing Aids. \"     If you do not have an account, please click on the \"Sign Up Now\" link. Current as of: May 12, 2017  Content Version: 11.5  © 5706-1754 Healthwise, Incorporated. Care instructions adapted under license by [facility] ([facility]). If you have questions about a medical condition or this instruction, always ask your healthcare professional. [place] [address] any warranty or liability for your use of this information. Patient Education        Learning About Vision Tests  What are vision tests?     The four most common all appointments, and call your doctor if you are having problems. It's also a good idea to know your test results and keep a list of the medicines you take. Where can you learn more? Go to https://channy.MyHeritage. org and sign in to your Partnered account. Enter G551 in the [facility] box to learn more about \"Learning About Vision Tests. \"     If you do not have an account, please click on the \"Sign Up Now\" link. Current as of: March 3, 2017  Content Version: 11.5  © 0474-2341 FastFig. Care instructions adapted under license by [facility] ([facility]). If you have questions about a medical condition or this instruction, always ask your healthcare professional. [address] any warranty or liability for your use of this information. Patient Education        Learning About How to Make a Home Safe  You can help protect the person in your care by making the home safe. Here are some general tips for how to lower the chance of getting injured in the home. · Pad sharp corners on furniture and counter tops. · Keep objects that are used often within easy reach. · Use guardrails on the side of the bed. The rails can help a person get out of bed. They also can prevent falls from the bed. · Install handrails around the toilet and in the shower. Use a tub mat to prevent slipping. · Use a shower chair or bath bench when the person bathes. · Provide good lighting. Put night-lights in bedrooms, hallways, and bathrooms. · Have a first aid kit. It is also important to be aware of safe temperatures in the home. When helping someone bathe, use the back of your hand to test the water to make sure it's not too hot. Lower the temperature setting in the hot water heater to 120°F or lower to avoid burns. And make sure other liquids (such as coffee, tea, or soup) are not too hot. How can you protect from fire and carbon monoxide? Home safety alarms are very important.  A heater, and other appliances. · If your loved one is still cooking, think about whether that is safe. It may be okay with some help, depending on your loved one's condition. But for people who have memory or thinking problems, it's best to avoid any activities that might not be safe. · If the person tends to wander or to try to leave the home, install motion-sensor lights on all doors and windows. · Have emergency numbers in a central area near a phone. Include 911 and numbers for the doctor and family members. · Get medical alert jewelry for the person so you can be contacted if he or she wanders away. If possible, provide a safe place for wandering, such as an enclosed yard or garden. Where can you learn more? Go to https://InRiverpierreeb.Phantom Pay. org and sign in to your FieldLens account. Enter V345 in the Concept3D box to learn more about \"Learning About How to Make a Home Safe. \"     If you do not have an account, please click on the \"Sign Up Now\" link. Current as of: September 24, 2016  Content Version: 11.5  © 9172-6498 Healthwise, Saberr. Care instructions adapted under license by [facility] ([facility]). If you have questions about a medical condition or this instruction, always ask your healthcare professional. [address] any warranty or liability for your use of this information. Patient Education        Home Safety Alarms: Care Instructions  Your Care Instructions  Home safety alarms save lives. For example, a smoke alarm can detect small amounts of smoke. This can give you time to escape from a fire. And a carbon monoxide alarm can let you know about this deadly gas before it starts to make you sick. It's important to have both kinds of alarms near all the sleeping areas and on each level of your home. You can buy alarms with different features.  For example, if you have a smoke alarm that is set off by steam or cooking smoke, you can buy one with a hush Then lift your second leg in. To get out of the car, do the same steps in reverse order:  1. When the door is open, lift your first leg out the door and put your foot on the ground. 2. As you do the same with your second leg, slide around so you are facing out the door. 3. Use the seat or door frame for support as you lean forward and push yourself up to a standing position. If your car seat has fabric upholstery, you might find that it's hard to slide around. Try covering theseat with something to make it easier to slide on, like a piece of plastic or vinyl. Make sure it doesn't get in the way of your seat belt. If you still have trouble, ask your physical therapist or occupational therapist to show you the best way to get in and out of a car. He or she can also tell you about tools that can make this easier for you. What tools can help you get in and out of a car safely? Grab bar and door strap    1. There are several types of handholds that you can install on the frame of your car door or beside the door. 2. They can give you something to hold on to as you get in and out of the car seat. Swivel seat    1. A swivel seat is like a lazy Bertell Ambrosia or a turntable. You sit down facing sideways and then use it to turn forward as you pull your legs in. Seat belt extender    1. You may have a hard time dealing with a normal seat belt. An extension may help you find and reach the end of the belt more easily. Follow-up care is a key part of your treatment and safety. Be sure to make and go to all appointments, and call your doctor if you are having problems. It's also a good idea to know your test results and keep a list of the medicines you take. Where can you learn more? Go to https://The [email or web address] [email or web address] Event [email or web address] org and sign in to your Zarpo account. Enter C532 in the MedyMatch box to learn more about \"Learning About Getting In and Out of a Car Safely. \"     If you do not have an account, please

## 2018-03-22 ENCOUNTER — HOSPITAL ENCOUNTER (OUTPATIENT)
Dept: GENERAL RADIOLOGY | Age: 73
Discharge: HOME | End: 2018-03-22
Payer: MEDICARE

## 2018-03-22 DIAGNOSIS — Z13.6 SCREENING FOR ABDOMINAL AORTIC ANEURYSM: ICD-10-CM

## 2018-03-22 PROCEDURE — 76706 US ABDL AORTA SCREEN AAA: CPT

## 2018-03-23 ENCOUNTER — TELEPHONE (OUTPATIENT)
Dept: PRIMARY CARE CLINIC | Age: 73
End: 2018-03-23

## 2018-03-23 DIAGNOSIS — I71.40 ABDOMINAL AORTIC ANEURYSM (AAA) WITHOUT RUPTURE: Primary | ICD-10-CM

## 2018-03-23 NOTE — TELEPHONE ENCOUNTER
Received a call from [facility] in [place] to verify a rx for soma   Instructed it was ok to fill at an out of state pharmacy. Voiced understanding.

## 2018-04-18 ENCOUNTER — OFFICE VISIT (OUTPATIENT)
Dept: VASCULAR SURGERY | Age: 73
End: 2018-04-18
Payer: MEDICARE

## 2018-04-18 VITALS
HEART RATE: 63 BPM | TEMPERATURE: 98 F | SYSTOLIC BLOOD PRESSURE: 152 MMHG | DIASTOLIC BLOOD PRESSURE: 90 MMHG | RESPIRATION RATE: 18 BRPM

## 2018-04-18 DIAGNOSIS — I71.40 ABDOMINAL AORTIC ANEURYSM (AAA) WITHOUT RUPTURE: Primary | ICD-10-CM

## 2018-04-18 PROCEDURE — 3017F COLORECTAL CA SCREEN DOC REV: CPT | Performed by: PHYSICIAN ASSISTANT

## 2018-04-18 PROCEDURE — 4040F PNEUMOC VAC/ADMIN/RCVD: CPT | Performed by: PHYSICIAN ASSISTANT

## 2018-04-18 PROCEDURE — G8427 DOCREV CUR MEDS BY ELIG CLIN: HCPCS | Performed by: PHYSICIAN ASSISTANT

## 2018-04-18 PROCEDURE — 1123F ACP DISCUSS/DSCN MKR DOCD: CPT | Performed by: PHYSICIAN ASSISTANT

## 2018-04-18 PROCEDURE — 4004F PT TOBACCO SCREEN RCVD TLK: CPT | Performed by: PHYSICIAN ASSISTANT

## 2018-04-18 PROCEDURE — G8420 CALC BMI NORM PARAMETERS: HCPCS | Performed by: PHYSICIAN ASSISTANT

## 2018-04-18 PROCEDURE — 99203 OFFICE O/P NEW LOW 30 MIN: CPT | Performed by: PHYSICIAN ASSISTANT

## 2018-04-18 RX ORDER — LANOLIN ALCOHOL/MO/W.PET/CERES
3 CREAM (GRAM) TOPICAL NIGHTLY
Status: ON HOLD | COMMUNITY
End: 2018-06-29 | Stop reason: HOSPADM

## 2018-04-23 ENCOUNTER — HOSPITAL ENCOUNTER (OUTPATIENT)
Dept: CT IMAGING | Age: 73
Discharge: HOME | End: 2018-04-23
Payer: MEDICARE

## 2018-04-23 ENCOUNTER — TELEPHONE (OUTPATIENT)
Dept: PRIMARY CARE CLINIC | Age: 73
End: 2018-04-23

## 2018-04-23 DIAGNOSIS — I71.40 ABDOMINAL AORTIC ANEURYSM (AAA) WITHOUT RUPTURE: ICD-10-CM

## 2018-04-23 DIAGNOSIS — E11.9 TYPE 2 DIABETES MELLITUS WITHOUT COMPLICATION, WITHOUT LONG-TERM CURRENT USE OF INSULIN (HCC): ICD-10-CM

## 2018-04-23 LAB
GFR NON-AFRICAN AMERICAN: >60
HBA1C MFR BLD: 5.9 %
PERFORMED ON: NORMAL
POC CREATININE: 1 MG/DL (ref 0.3–1.3)
POC SAMPLE TYPE: NORMAL

## 2018-04-23 PROCEDURE — 82565 ASSAY OF CREATININE: CPT

## 2018-04-23 PROCEDURE — 6360000004 HC RX CONTRAST MEDICATION: Performed by: PHYSICIAN ASSISTANT

## 2018-04-23 PROCEDURE — 74174 CTA ABD&PLVS W/CONTRAST: CPT

## 2018-04-23 RX ADMIN — IOPAMIDOL 90 ML: 755 INJECTION, SOLUTION INTRAVENOUS at 08:37

## 2018-04-26 ENCOUNTER — TELEPHONE (OUTPATIENT)
Dept: VASCULAR SURGERY | Age: 73
End: 2018-04-26

## 2018-05-09 ENCOUNTER — OFFICE VISIT (OUTPATIENT)
Dept: VASCULAR SURGERY | Age: 73
End: 2018-05-09
Payer: MEDICARE

## 2018-05-09 VITALS
TEMPERATURE: 98.6 F | RESPIRATION RATE: 18 BRPM | SYSTOLIC BLOOD PRESSURE: 149 MMHG | DIASTOLIC BLOOD PRESSURE: 80 MMHG | HEART RATE: 104 BPM

## 2018-05-09 DIAGNOSIS — I72.3 ANEURYSM OF RIGHT INTERNAL ILIAC ARTERY (HCC): ICD-10-CM

## 2018-05-09 DIAGNOSIS — I71.40 ABDOMINAL AORTIC ANEURYSM (AAA) WITHOUT RUPTURE: Primary | ICD-10-CM

## 2018-05-09 PROCEDURE — G8427 DOCREV CUR MEDS BY ELIG CLIN: HCPCS | Performed by: PHYSICIAN ASSISTANT

## 2018-05-09 PROCEDURE — G8420 CALC BMI NORM PARAMETERS: HCPCS | Performed by: PHYSICIAN ASSISTANT

## 2018-05-09 PROCEDURE — 1123F ACP DISCUSS/DSCN MKR DOCD: CPT | Performed by: PHYSICIAN ASSISTANT

## 2018-05-09 PROCEDURE — 3017F COLORECTAL CA SCREEN DOC REV: CPT | Performed by: PHYSICIAN ASSISTANT

## 2018-05-09 PROCEDURE — 99213 OFFICE O/P EST LOW 20 MIN: CPT | Performed by: PHYSICIAN ASSISTANT

## 2018-05-09 PROCEDURE — 4040F PNEUMOC VAC/ADMIN/RCVD: CPT | Performed by: PHYSICIAN ASSISTANT

## 2018-05-09 PROCEDURE — 4004F PT TOBACCO SCREEN RCVD TLK: CPT | Performed by: PHYSICIAN ASSISTANT

## 2018-05-16 ENCOUNTER — TELEPHONE (OUTPATIENT)
Dept: VASCULAR SURGERY | Age: 73
End: 2018-05-16

## 2018-05-29 ENCOUNTER — HOSPITAL ENCOUNTER (OUTPATIENT)
Dept: GENERAL RADIOLOGY | Age: 73
Discharge: HOME | End: 2018-05-29
Payer: MEDICARE

## 2018-05-29 ENCOUNTER — HOSPITAL ENCOUNTER (OUTPATIENT)
Dept: PREADMISSION TESTING | Age: 73
Discharge: HOME | End: 2018-06-02
Payer: MEDICARE

## 2018-05-29 VITALS — HEIGHT: 73 IN | BODY MASS INDEX: 23.72 KG/M2 | WEIGHT: 179 LBS

## 2018-05-29 LAB
ALBUMIN SERPL-MCNC: 4.5 G/DL (ref 3.5–5.2)
ALP BLD-CCNC: 90 U/L (ref 40–130)
ALT SERPL-CCNC: 28 U/L (ref 5–41)
ANION GAP SERPL CALCULATED.3IONS-SCNC: 15 MMOL/L (ref 7–19)
APTT: 28.1 SEC (ref 26–36.2)
AST SERPL-CCNC: 19 U/L (ref 5–40)
BASOPHILS ABSOLUTE: 0 K/UL (ref 0–0.2)
BASOPHILS RELATIVE PERCENT: 0.4 % (ref 0–1)
BILIRUB SERPL-MCNC: 0.4 MG/DL (ref 0.2–1.2)
BUN BLDV-MCNC: 20 MG/DL (ref 8–23)
CALCIUM SERPL-MCNC: 9.7 MG/DL (ref 8.8–10.2)
CHLORIDE BLD-SCNC: 101 MMOL/L (ref 98–111)
CO2: 26 MMOL/L (ref 22–29)
CREAT SERPL-MCNC: 0.7 MG/DL (ref 0.5–1.2)
EOSINOPHILS ABSOLUTE: 0.2 K/UL (ref 0–0.6)
EOSINOPHILS RELATIVE PERCENT: 2.3 % (ref 0–5)
GFR NON-AFRICAN AMERICAN: >60
GLUCOSE BLD-MCNC: 112 MG/DL (ref 74–109)
HCT VFR BLD CALC: 45.3 % (ref 42–52)
HEMOGLOBIN: 14.9 G/DL (ref 14–18)
INR BLD: 0.95 (ref 0.88–1.18)
LYMPHOCYTES ABSOLUTE: 3.2 K/UL (ref 1.1–4.5)
LYMPHOCYTES RELATIVE PERCENT: 33 % (ref 20–40)
MCH RBC QN AUTO: 30.9 PG (ref 27–31)
MCHC RBC AUTO-ENTMCNC: 32.9 G/DL (ref 33–37)
MCV RBC AUTO: 94 FL (ref 80–94)
MONOCYTES ABSOLUTE: 0.8 K/UL (ref 0–0.9)
MONOCYTES RELATIVE PERCENT: 8 % (ref 0–10)
NEUTROPHILS ABSOLUTE: 5.4 K/UL (ref 1.5–7.5)
NEUTROPHILS RELATIVE PERCENT: 55.7 % (ref 50–65)
PDW BLD-RTO: 13.9 % (ref 11.5–14.5)
PLATELET # BLD: 276 K/UL (ref 130–400)
PMV BLD AUTO: 11.6 FL (ref 9.4–12.4)
POTASSIUM SERPL-SCNC: 4.6 MMOL/L (ref 3.5–5)
PROTHROMBIN TIME: 12.6 SEC (ref 12–14.6)
RBC # BLD: 4.82 M/UL (ref 4.7–6.1)
SODIUM BLD-SCNC: 142 MMOL/L (ref 136–145)
TOTAL PROTEIN: 7.2 G/DL (ref 6.6–8.7)
WBC # BLD: 9.7 K/UL (ref 4.8–10.8)

## 2018-05-29 PROCEDURE — 80053 COMPREHEN METABOLIC PANEL: CPT

## 2018-05-29 PROCEDURE — 71046 X-RAY EXAM CHEST 2 VIEWS: CPT

## 2018-05-29 PROCEDURE — 85025 COMPLETE CBC W/AUTO DIFF WBC: CPT

## 2018-05-29 PROCEDURE — 85730 THROMBOPLASTIN TIME PARTIAL: CPT

## 2018-05-29 PROCEDURE — 87070 CULTURE OTHR SPECIMN AEROBIC: CPT

## 2018-05-29 PROCEDURE — 93005 ELECTROCARDIOGRAM TRACING: CPT

## 2018-05-29 PROCEDURE — 85610 PROTHROMBIN TIME: CPT

## 2018-05-29 RX ORDER — PHENOL 1.4 %
1 AEROSOL, SPRAY (ML) MUCOUS MEMBRANE DAILY
COMMUNITY

## 2018-05-29 RX ORDER — CARISOPRODOL 250 MG/1
350 TABLET ORAL 4 TIMES DAILY PRN
Status: ON HOLD | COMMUNITY
End: 2018-06-29 | Stop reason: HOSPADM

## 2018-05-30 LAB
EKG P AXIS: 68 DEGREES
EKG P-R INTERVAL: 172 MS
EKG Q-T INTERVAL: 344 MS
EKG QRS DURATION: 106 MS
EKG QTC CALCULATION (BAZETT): 394 MS
EKG T AXIS: 49 DEGREES

## 2018-05-31 LAB — MRSA CULTURE ONLY: NORMAL

## 2018-06-11 ENCOUNTER — ANESTHESIA (OUTPATIENT)
Dept: OPERATING ROOM | Age: 73
DRG: 268 | End: 2018-06-11
Payer: MEDICARE

## 2018-06-11 ENCOUNTER — HOSPITAL ENCOUNTER (INPATIENT)
Age: 73
LOS: 9 days | Discharge: NURSING, REHAB OR LONG-TERM CARE | DRG: 268 | End: 2018-06-20
Attending: SURGERY | Admitting: SURGERY
Payer: MEDICARE

## 2018-06-11 ENCOUNTER — ANESTHESIA EVENT (OUTPATIENT)
Dept: OPERATING ROOM | Age: 73
DRG: 268 | End: 2018-06-11
Payer: MEDICARE

## 2018-06-11 ENCOUNTER — APPOINTMENT (OUTPATIENT)
Dept: GENERAL RADIOLOGY | Age: 73
DRG: 268 | End: 2018-06-11
Attending: SURGERY
Payer: MEDICARE

## 2018-06-11 VITALS
SYSTOLIC BLOOD PRESSURE: 101 MMHG | DIASTOLIC BLOOD PRESSURE: 81 MMHG | RESPIRATION RATE: 2 BRPM | TEMPERATURE: 92 F | OXYGEN SATURATION: 100 %

## 2018-06-11 PROBLEM — I71.40 AAA (ABDOMINAL AORTIC ANEURYSM): Status: ACTIVE | Noted: 2018-06-11

## 2018-06-11 PROBLEM — E11.8 TYPE 2 DIABETES MELLITUS WITH COMPLICATION, WITHOUT LONG-TERM CURRENT USE OF INSULIN (HCC): Status: ACTIVE | Noted: 2018-06-11

## 2018-06-11 PROBLEM — D68.9 COAGULOPATHY (HCC): Status: ACTIVE | Noted: 2018-06-11

## 2018-06-11 PROBLEM — D62 ACUTE BLOOD LOSS AS CAUSE OF POSTOPERATIVE ANEMIA: Status: ACTIVE | Noted: 2018-06-11

## 2018-06-11 LAB
ABO/RH: NORMAL
ANION GAP SERPL CALCULATED.3IONS-SCNC: 13 MMOL/L (ref 7–19)
ANTIBODY SCREEN: NORMAL
BASE EXCESS ARTERIAL: -5.9 MMOL/L (ref -2–2)
BASE EXCESS ARTERIAL: -8.5 MMOL/L (ref -2–2)
BASE EXCESS ARTERIAL: -9 (ref -3–3)
BLOOD BANK DISPENSE STATUS: NORMAL
BLOOD BANK DISPENSE STATUS: NORMAL
BLOOD BANK PRODUCT CODE: NORMAL
BLOOD BANK PRODUCT CODE: NORMAL
BPU ID: NORMAL
BPU ID: NORMAL
BUN BLDV-MCNC: 19 MG/DL (ref 8–23)
CALCIUM IONIZED: 2.06 MMOL/L (ref 1.1–1.3)
CALCIUM SERPL-MCNC: 7.7 MG/DL (ref 8.8–10.2)
CARBOXYHEMOGLOBIN ARTERIAL: 1.4 % (ref 0–5)
CARBOXYHEMOGLOBIN ARTERIAL: 1.8 % (ref 0–5)
CHLORIDE BLD-SCNC: 108 MMOL/L (ref 98–111)
CO2: 18 MEQ/L (ref 21–32)
CO2: 19 MMOL/L (ref 22–29)
CREAT SERPL-MCNC: 1.1 MG/DL (ref 0.5–1.2)
DESCRIPTION BLOOD BANK: NORMAL
DESCRIPTION BLOOD BANK: NORMAL
GFR NON-AFRICAN AMERICAN: >60
GFR NON-AFRICAN AMERICAN: >60
GLUCOSE BLD-MCNC: 111 MG/DL (ref 70–99)
GLUCOSE BLD-MCNC: 137 MG/DL (ref 70–99)
GLUCOSE BLD-MCNC: 205 MG/DL (ref 70–99)
GLUCOSE BLD-MCNC: 237 MG/DL (ref 70–99)
GLUCOSE BLD-MCNC: 261 MG/DL (ref 70–99)
GLUCOSE BLD-MCNC: 293 MG/DL (ref 74–109)
HBA1C MFR BLD: 5.7 %
HCO3 ARTERIAL: 17.3 MMOL/L (ref 22–26)
HCO3 ARTERIAL: 18.6 MMOL/L (ref 22–26)
HCT VFR BLD CALC: 39.6 % (ref 42–52)
HCT VFR BLD CALC: 40.8 % (ref 42–52)
HEMOGLOBIN, ART, EXTENDED: 13.6 G/DL (ref 14–18)
HEMOGLOBIN, ART, EXTENDED: 13.7 G/DL (ref 14–18)
HEMOGLOBIN: 13.4 G/DL (ref 14–18)
HEMOGLOBIN: 13.5 G/DL (ref 14–18)
HEMOGLOBIN: 6.3 GM/DL (ref 12–18)
INR BLD: 1.54 (ref 0.88–1.18)
MCH RBC QN AUTO: 29.8 PG (ref 27–31)
MCH RBC QN AUTO: 30 PG (ref 27–31)
MCHC RBC AUTO-ENTMCNC: 33.1 G/DL (ref 33–37)
MCHC RBC AUTO-ENTMCNC: 33.8 G/DL (ref 33–37)
MCV RBC AUTO: 88.2 FL (ref 80–94)
MCV RBC AUTO: 90.7 FL (ref 80–94)
METHEMOGLOBIN ARTERIAL: 1 %
METHEMOGLOBIN ARTERIAL: 1.1 %
O2 CONTENT ARTERIAL: 18.5 ML/DL
O2 CONTENT ARTERIAL: 19.8 ML/DL
O2 SAT, ARTERIAL: 100 % (ref 93–100)
O2 SAT, ARTERIAL: 96.2 %
O2 SAT, ARTERIAL: 97.3 %
O2 THERAPY: ABNORMAL
O2 THERAPY: ABNORMAL
PCO2 ARTERIAL: 33 MMHG (ref 35–45)
PCO2 ARTERIAL: 36 MMHG (ref 35–45)
PCO2 ARTERIAL: 42 MM HG (ref 35–48)
PDW BLD-RTO: 14.3 % (ref 11.5–14.5)
PDW BLD-RTO: 15 % (ref 11.5–14.5)
PERFORMED ON: ABNORMAL
PH ARTERIAL: 7.23 (ref 7.3–7.5)
PH ARTERIAL: 7.29 (ref 7.35–7.45)
PH ARTERIAL: 7.36 (ref 7.35–7.45)
PLATELET # BLD: 120 K/UL (ref 130–400)
PLATELET # BLD: 133 K/UL (ref 130–400)
PMV BLD AUTO: 10 FL (ref 9.4–12.4)
PMV BLD AUTO: 9.7 FL (ref 9.4–12.4)
PO2 ARTERIAL: 112 MMHG (ref 80–100)
PO2 ARTERIAL: 276 MM HG (ref 83–108)
PO2 ARTERIAL: 399 MMHG (ref 80–100)
POC ACT LR: 257 SEC
POC ACT LR: 275 SEC
POC ANION GAP: 10
POC CHLORIDE: 115 MEQ/L (ref 99–110)
POC CREATININE: 0.8 MG/DL (ref 0.3–1.3)
POC HEMATOCRIT: 19 % (ref 37–52)
POC POTASSIUM: 5.3 MEQ/L (ref 3.5–5.1)
POC SAMPLE TYPE: ABNORMAL
POC SODIUM: 143 MEQ/L (ref 136–145)
POTASSIUM SERPL-SCNC: 4.4 MMOL/L (ref 3.5–5)
POTASSIUM, WHOLE BLOOD: 4.1
POTASSIUM, WHOLE BLOOD: 4.6
PROTHROMBIN TIME: 18.5 SEC (ref 12–14.6)
RBC # BLD: 4.49 M/UL (ref 4.7–6.1)
RBC # BLD: 4.5 M/UL (ref 4.7–6.1)
SODIUM BLD-SCNC: 140 MMOL/L (ref 136–145)
TCO2 ARTERIAL: 19 MMOL/L
WBC # BLD: 13.2 K/UL (ref 4.8–10.8)
WBC # BLD: 14.3 K/UL (ref 4.8–10.8)

## 2018-06-11 PROCEDURE — 04R00JZ REPLACEMENT OF ABDOMINAL AORTA WITH SYNTHETIC SUBSTITUTE, OPEN APPROACH: ICD-10-PCS | Performed by: SURGERY

## 2018-06-11 PROCEDURE — 86900 BLOOD TYPING SEROLOGIC ABO: CPT

## 2018-06-11 PROCEDURE — 36600 WITHDRAWAL OF ARTERIAL BLOOD: CPT

## 2018-06-11 PROCEDURE — 6360000002 HC RX W HCPCS: Performed by: SURGERY

## 2018-06-11 PROCEDURE — 2500000003 HC RX 250 WO HCPCS: Performed by: SURGERY

## 2018-06-11 PROCEDURE — 04C00ZZ EXTIRPATION OF MATTER FROM ABDOMINAL AORTA, OPEN APPROACH: ICD-10-PCS | Performed by: SURGERY

## 2018-06-11 PROCEDURE — 71045 X-RAY EXAM CHEST 1 VIEW: CPT

## 2018-06-11 PROCEDURE — 82374 ASSAY BLOOD CARBON DIOXIDE: CPT

## 2018-06-11 PROCEDURE — 83036 HEMOGLOBIN GLYCOSYLATED A1C: CPT

## 2018-06-11 PROCEDURE — 85027 COMPLETE CBC AUTOMATED: CPT

## 2018-06-11 PROCEDURE — C1768 GRAFT, VASCULAR: HCPCS | Performed by: SURGERY

## 2018-06-11 PROCEDURE — 82330 ASSAY OF CALCIUM: CPT

## 2018-06-11 PROCEDURE — 84132 ASSAY OF SERUM POTASSIUM: CPT

## 2018-06-11 PROCEDURE — 3600000015 HC SURGERY LEVEL 5 ADDTL 15MIN: Performed by: SURGERY

## 2018-06-11 PROCEDURE — 94002 VENT MGMT INPAT INIT DAY: CPT

## 2018-06-11 PROCEDURE — 2580000003 HC RX 258: Performed by: SURGERY

## 2018-06-11 PROCEDURE — 2580000003 HC RX 258: Performed by: ANESTHESIOLOGY

## 2018-06-11 PROCEDURE — 82948 REAGENT STRIP/BLOOD GLUCOSE: CPT

## 2018-06-11 PROCEDURE — 80048 BASIC METABOLIC PNL TOTAL CA: CPT

## 2018-06-11 PROCEDURE — 86850 RBC ANTIBODY SCREEN: CPT

## 2018-06-11 PROCEDURE — 36415 COLL VENOUS BLD VENIPUNCTURE: CPT

## 2018-06-11 PROCEDURE — C1751 CATH, INF, PER/CENT/MIDLINE: HCPCS | Performed by: SURGERY

## 2018-06-11 PROCEDURE — 04QC0ZZ REPAIR RIGHT COMMON ILIAC ARTERY, OPEN APPROACH: ICD-10-PCS | Performed by: SURGERY

## 2018-06-11 PROCEDURE — S0028 INJECTION, FAMOTIDINE, 20 MG: HCPCS | Performed by: FAMILY MEDICINE

## 2018-06-11 PROCEDURE — 2000000000 HC ICU R&B

## 2018-06-11 PROCEDURE — 82810 BLOOD GASES O2 SAT ONLY: CPT

## 2018-06-11 PROCEDURE — 6360000002 HC RX W HCPCS

## 2018-06-11 PROCEDURE — 2580000003 HC RX 258

## 2018-06-11 PROCEDURE — 2780000010 HC IMPLANT OTHER: Performed by: SURGERY

## 2018-06-11 PROCEDURE — 04UL0JZ SUPPLEMENT LEFT FEMORAL ARTERY WITH SYNTHETIC SUBSTITUTE, OPEN APPROACH: ICD-10-PCS | Performed by: SURGERY

## 2018-06-11 PROCEDURE — 84295 ASSAY OF SERUM SODIUM: CPT

## 2018-06-11 PROCEDURE — 2700000000 HC OXYGEN THERAPY PER DAY

## 2018-06-11 PROCEDURE — 36592 COLLECT BLOOD FROM PICC: CPT

## 2018-06-11 PROCEDURE — 99222 1ST HOSP IP/OBS MODERATE 55: CPT | Performed by: FAMILY MEDICINE

## 2018-06-11 PROCEDURE — 5A1945Z RESPIRATORY VENTILATION, 24-96 CONSECUTIVE HOURS: ICD-10-PCS | Performed by: SURGERY

## 2018-06-11 PROCEDURE — 3700000001 HC ADD 15 MINUTES (ANESTHESIA): Performed by: SURGERY

## 2018-06-11 PROCEDURE — 36620 INSERTION CATHETER ARTERY: CPT

## 2018-06-11 PROCEDURE — 85347 COAGULATION TIME ACTIVATED: CPT

## 2018-06-11 PROCEDURE — 3700000000 HC ANESTHESIA ATTENDED CARE: Performed by: SURGERY

## 2018-06-11 PROCEDURE — 6370000000 HC RX 637 (ALT 250 FOR IP): Performed by: FAMILY MEDICINE

## 2018-06-11 PROCEDURE — P9016 RBC LEUKOCYTES REDUCED: HCPCS

## 2018-06-11 PROCEDURE — 35102 REPAIR DEFECT OF ARTERY: CPT | Performed by: SURGERY

## 2018-06-11 PROCEDURE — 2500000003 HC RX 250 WO HCPCS

## 2018-06-11 PROCEDURE — 82800 BLOOD PH: CPT

## 2018-06-11 PROCEDURE — P9059 PLASMA, FRZ BETWEEN 8-24HOUR: HCPCS

## 2018-06-11 PROCEDURE — 85610 PROTHROMBIN TIME: CPT

## 2018-06-11 PROCEDURE — 82565 ASSAY OF CREATININE: CPT

## 2018-06-11 PROCEDURE — P9035 PLATELET PHERES LEUKOREDUCED: HCPCS

## 2018-06-11 PROCEDURE — 85014 HEMATOCRIT: CPT

## 2018-06-11 PROCEDURE — 2500000003 HC RX 250 WO HCPCS: Performed by: FAMILY MEDICINE

## 2018-06-11 PROCEDURE — 2720000001 HC MISC SURG SUPPLY STERILE $51-500: Performed by: SURGERY

## 2018-06-11 PROCEDURE — 3600000005 HC SURGERY LEVEL 5 BASE: Performed by: SURGERY

## 2018-06-11 PROCEDURE — 82803 BLOOD GASES ANY COMBINATION: CPT

## 2018-06-11 PROCEDURE — 35371 RECHANNELING OF ARTERY: CPT | Performed by: SURGERY

## 2018-06-11 PROCEDURE — 04CL0ZZ EXTIRPATION OF MATTER FROM LEFT FEMORAL ARTERY, OPEN APPROACH: ICD-10-PCS | Performed by: SURGERY

## 2018-06-11 PROCEDURE — 36556 INSERT NON-TUNNEL CV CATH: CPT

## 2018-06-11 PROCEDURE — 86901 BLOOD TYPING SEROLOGIC RH(D): CPT

## 2018-06-11 PROCEDURE — 82435 ASSAY OF BLOOD CHLORIDE: CPT

## 2018-06-11 DEVICE — IMPLANTABLE DEVICE: Type: IMPLANTABLE DEVICE | Site: AORTA | Status: FUNCTIONAL

## 2018-06-11 RX ORDER — SODIUM CHLORIDE, SODIUM LACTATE, POTASSIUM CHLORIDE, CALCIUM CHLORIDE 600; 310; 30; 20 MG/100ML; MG/100ML; MG/100ML; MG/100ML
INJECTION, SOLUTION INTRAVENOUS CONTINUOUS
Status: DISCONTINUED | OUTPATIENT
Start: 2018-06-11 | End: 2018-06-11

## 2018-06-11 RX ORDER — SODIUM CHLORIDE 0.9 % (FLUSH) 0.9 %
10 SYRINGE (ML) INJECTION EVERY 12 HOURS SCHEDULED
Status: DISCONTINUED | OUTPATIENT
Start: 2018-06-11 | End: 2018-06-20 | Stop reason: HOSPADM

## 2018-06-11 RX ORDER — HYDROCODONE BITARTRATE AND ACETAMINOPHEN 5; 325 MG/1; MG/1
2 TABLET ORAL EVERY 4 HOURS PRN
Status: DISCONTINUED | OUTPATIENT
Start: 2018-06-11 | End: 2018-06-20 | Stop reason: HOSPADM

## 2018-06-11 RX ORDER — CHLORHEXIDINE GLUCONATE 0.12 MG/ML
15 RINSE ORAL 2 TIMES DAILY
Status: DISCONTINUED | OUTPATIENT
Start: 2018-06-11 | End: 2018-06-20 | Stop reason: HOSPADM

## 2018-06-11 RX ORDER — 0.9 % SODIUM CHLORIDE 0.9 %
250 INTRAVENOUS SOLUTION INTRAVENOUS ONCE
Status: DISCONTINUED | OUTPATIENT
Start: 2018-06-11 | End: 2018-06-12 | Stop reason: SDUPTHER

## 2018-06-11 RX ORDER — MORPHINE SULFATE 1 MG/ML
4 INJECTION, SOLUTION EPIDURAL; INTRATHECAL; INTRAVENOUS EVERY 5 MIN PRN
Status: DISCONTINUED | OUTPATIENT
Start: 2018-06-11 | End: 2018-06-11 | Stop reason: HOSPADM

## 2018-06-11 RX ORDER — HYDROMORPHONE HCL IN 0.9% NACL 0.5 MG/ML
0.5 SYRINGE (ML) INTRAVENOUS EVERY 5 MIN PRN
Status: DISCONTINUED | OUTPATIENT
Start: 2018-06-11 | End: 2018-06-11 | Stop reason: HOSPADM

## 2018-06-11 RX ORDER — MIDAZOLAM HYDROCHLORIDE 1 MG/ML
2 INJECTION INTRAMUSCULAR; INTRAVENOUS
Status: COMPLETED | OUTPATIENT
Start: 2018-06-11 | End: 2018-06-11

## 2018-06-11 RX ORDER — SODIUM CHLORIDE 0.9 % (FLUSH) 0.9 %
10 SYRINGE (ML) INJECTION PRN
Status: DISCONTINUED | OUTPATIENT
Start: 2018-06-11 | End: 2018-06-20 | Stop reason: HOSPADM

## 2018-06-11 RX ORDER — DEXTROSE MONOHYDRATE 50 MG/ML
100 INJECTION, SOLUTION INTRAVENOUS PRN
Status: DISCONTINUED | OUTPATIENT
Start: 2018-06-11 | End: 2018-06-20 | Stop reason: HOSPADM

## 2018-06-11 RX ORDER — DIPHENHYDRAMINE HYDROCHLORIDE 50 MG/ML
12.5 INJECTION INTRAMUSCULAR; INTRAVENOUS
Status: DISCONTINUED | OUTPATIENT
Start: 2018-06-11 | End: 2018-06-11 | Stop reason: HOSPADM

## 2018-06-11 RX ORDER — FENTANYL CITRATE 50 UG/ML
50 INJECTION, SOLUTION INTRAMUSCULAR; INTRAVENOUS
Status: DISCONTINUED | OUTPATIENT
Start: 2018-06-11 | End: 2018-06-11 | Stop reason: HOSPADM

## 2018-06-11 RX ORDER — FENTANYL CITRATE 50 UG/ML
INJECTION, SOLUTION INTRAMUSCULAR; INTRAVENOUS PRN
Status: DISCONTINUED | OUTPATIENT
Start: 2018-06-11 | End: 2018-06-11 | Stop reason: SDUPTHER

## 2018-06-11 RX ORDER — MEPERIDINE HYDROCHLORIDE 50 MG/ML
12.5 INJECTION INTRAMUSCULAR; INTRAVENOUS; SUBCUTANEOUS EVERY 5 MIN PRN
Status: DISCONTINUED | OUTPATIENT
Start: 2018-06-11 | End: 2018-06-11 | Stop reason: HOSPADM

## 2018-06-11 RX ORDER — ESMOLOL HYDROCHLORIDE 10 MG/ML
50 INJECTION, SOLUTION INTRAVENOUS CONTINUOUS
Status: DISCONTINUED | OUTPATIENT
Start: 2018-06-11 | End: 2018-06-12

## 2018-06-11 RX ORDER — PROPOFOL 10 MG/ML
10 INJECTION, EMULSION INTRAVENOUS
Status: DISCONTINUED | OUTPATIENT
Start: 2018-06-11 | End: 2018-06-12

## 2018-06-11 RX ORDER — NICOTINE POLACRILEX 4 MG
15 LOZENGE BUCCAL PRN
Status: DISCONTINUED | OUTPATIENT
Start: 2018-06-11 | End: 2018-06-20 | Stop reason: HOSPADM

## 2018-06-11 RX ORDER — HETASTARCH 6 G/100ML
INJECTION, SOLUTION INTRAVENOUS PRN
Status: DISCONTINUED | OUTPATIENT
Start: 2018-06-11 | End: 2018-06-11 | Stop reason: SDUPTHER

## 2018-06-11 RX ORDER — METOCLOPRAMIDE HYDROCHLORIDE 5 MG/ML
10 INJECTION INTRAMUSCULAR; INTRAVENOUS
Status: DISCONTINUED | OUTPATIENT
Start: 2018-06-11 | End: 2018-06-11 | Stop reason: HOSPADM

## 2018-06-11 RX ORDER — HEPARIN SODIUM 1000 [USP'U]/ML
INJECTION, SOLUTION INTRAVENOUS; SUBCUTANEOUS PRN
Status: DISCONTINUED | OUTPATIENT
Start: 2018-06-11 | End: 2018-06-11 | Stop reason: SDUPTHER

## 2018-06-11 RX ORDER — CLONIDINE HYDROCHLORIDE 0.1 MG/1
0.1 TABLET ORAL
Status: DISCONTINUED | OUTPATIENT
Start: 2018-06-11 | End: 2018-06-20 | Stop reason: HOSPADM

## 2018-06-11 RX ORDER — SODIUM CHLORIDE 9 MG/ML
INJECTION, SOLUTION INTRAVENOUS CONTINUOUS
Status: DISCONTINUED | OUTPATIENT
Start: 2018-06-11 | End: 2018-06-11

## 2018-06-11 RX ORDER — SODIUM CHLORIDE 9 MG/ML
INJECTION, SOLUTION INTRAVENOUS CONTINUOUS PRN
Status: DISCONTINUED | OUTPATIENT
Start: 2018-06-11 | End: 2018-06-11 | Stop reason: SDUPTHER

## 2018-06-11 RX ORDER — ENALAPRILAT 2.5 MG/2ML
1.25 INJECTION INTRAVENOUS
Status: DISCONTINUED | OUTPATIENT
Start: 2018-06-11 | End: 2018-06-11 | Stop reason: HOSPADM

## 2018-06-11 RX ORDER — PROPOFOL 10 MG/ML
INJECTION, EMULSION INTRAVENOUS PRN
Status: DISCONTINUED | OUTPATIENT
Start: 2018-06-11 | End: 2018-06-11 | Stop reason: SDUPTHER

## 2018-06-11 RX ORDER — LIDOCAINE HYDROCHLORIDE 10 MG/ML
1 INJECTION, SOLUTION EPIDURAL; INFILTRATION; INTRACAUDAL; PERINEURAL
Status: DISCONTINUED | OUTPATIENT
Start: 2018-06-11 | End: 2018-06-11 | Stop reason: HOSPADM

## 2018-06-11 RX ORDER — DEXAMETHASONE SODIUM PHOSPHATE 10 MG/ML
INJECTION INTRAMUSCULAR; INTRAVENOUS PRN
Status: DISCONTINUED | OUTPATIENT
Start: 2018-06-11 | End: 2018-06-11 | Stop reason: SDUPTHER

## 2018-06-11 RX ORDER — MORPHINE SULFATE 1 MG/ML
2 INJECTION, SOLUTION EPIDURAL; INTRATHECAL; INTRAVENOUS EVERY 5 MIN PRN
Status: DISCONTINUED | OUTPATIENT
Start: 2018-06-11 | End: 2018-06-11 | Stop reason: HOSPADM

## 2018-06-11 RX ORDER — ONDANSETRON 2 MG/ML
4 INJECTION INTRAMUSCULAR; INTRAVENOUS EVERY 6 HOURS PRN
Status: DISCONTINUED | OUTPATIENT
Start: 2018-06-11 | End: 2018-06-20 | Stop reason: HOSPADM

## 2018-06-11 RX ORDER — DEXTROSE MONOHYDRATE 25 G/50ML
12.5 INJECTION, SOLUTION INTRAVENOUS PRN
Status: DISCONTINUED | OUTPATIENT
Start: 2018-06-11 | End: 2018-06-20 | Stop reason: HOSPADM

## 2018-06-11 RX ORDER — SODIUM CHLORIDE 0.9 % (FLUSH) 0.9 %
10 SYRINGE (ML) INJECTION EVERY 12 HOURS SCHEDULED
Status: DISCONTINUED | OUTPATIENT
Start: 2018-06-11 | End: 2018-06-11 | Stop reason: HOSPADM

## 2018-06-11 RX ORDER — HYDROCODONE BITARTRATE AND ACETAMINOPHEN 5; 325 MG/1; MG/1
1 TABLET ORAL EVERY 4 HOURS PRN
Status: DISCONTINUED | OUTPATIENT
Start: 2018-06-11 | End: 2018-06-20 | Stop reason: HOSPADM

## 2018-06-11 RX ORDER — SODIUM CHLORIDE, SODIUM LACTATE, POTASSIUM CHLORIDE, AND CALCIUM CHLORIDE .6; .31; .03; .02 G/100ML; G/100ML; G/100ML; G/100ML
500 INJECTION, SOLUTION INTRAVENOUS ONCE
Status: COMPLETED | OUTPATIENT
Start: 2018-06-11 | End: 2018-06-11

## 2018-06-11 RX ORDER — HYDRALAZINE HYDROCHLORIDE 20 MG/ML
INJECTION INTRAMUSCULAR; INTRAVENOUS
Status: DISPENSED
Start: 2018-06-11 | End: 2018-06-12

## 2018-06-11 RX ORDER — HYDRALAZINE HYDROCHLORIDE 20 MG/ML
10 INJECTION INTRAMUSCULAR; INTRAVENOUS EVERY 6 HOURS PRN
Status: DISCONTINUED | OUTPATIENT
Start: 2018-06-11 | End: 2018-06-20 | Stop reason: HOSPADM

## 2018-06-11 RX ORDER — HYDRALAZINE HYDROCHLORIDE 20 MG/ML
5 INJECTION INTRAMUSCULAR; INTRAVENOUS EVERY 10 MIN PRN
Status: DISCONTINUED | OUTPATIENT
Start: 2018-06-11 | End: 2018-06-11 | Stop reason: HOSPADM

## 2018-06-11 RX ORDER — MORPHINE SULFATE 1 MG/ML
2 INJECTION, SOLUTION EPIDURAL; INTRATHECAL; INTRAVENOUS
Status: DISCONTINUED | OUTPATIENT
Start: 2018-06-11 | End: 2018-06-20 | Stop reason: HOSPADM

## 2018-06-11 RX ORDER — EPHEDRINE SULFATE 50 MG/ML
INJECTION, SOLUTION INTRAVENOUS PRN
Status: DISCONTINUED | OUTPATIENT
Start: 2018-06-11 | End: 2018-06-11 | Stop reason: SDUPTHER

## 2018-06-11 RX ORDER — ACETAMINOPHEN 325 MG/1
650 TABLET ORAL EVERY 4 HOURS PRN
Status: DISCONTINUED | OUTPATIENT
Start: 2018-06-11 | End: 2018-06-20 | Stop reason: HOSPADM

## 2018-06-11 RX ORDER — ASPIRIN 81 MG/1
81 TABLET ORAL DAILY
Status: DISCONTINUED | OUTPATIENT
Start: 2018-06-11 | End: 2018-06-20 | Stop reason: HOSPADM

## 2018-06-11 RX ORDER — LIDOCAINE HYDROCHLORIDE 10 MG/ML
INJECTION, SOLUTION EPIDURAL; INFILTRATION; INTRACAUDAL; PERINEURAL PRN
Status: DISCONTINUED | OUTPATIENT
Start: 2018-06-11 | End: 2018-06-11 | Stop reason: SDUPTHER

## 2018-06-11 RX ORDER — SODIUM CHLORIDE, SODIUM LACTATE, POTASSIUM CHLORIDE, CALCIUM CHLORIDE 600; 310; 30; 20 MG/100ML; MG/100ML; MG/100ML; MG/100ML
INJECTION, SOLUTION INTRAVENOUS CONTINUOUS
Status: DISCONTINUED | OUTPATIENT
Start: 2018-06-11 | End: 2018-06-12

## 2018-06-11 RX ORDER — MIDAZOLAM HYDROCHLORIDE 1 MG/ML
INJECTION INTRAMUSCULAR; INTRAVENOUS
Status: COMPLETED
Start: 2018-06-11 | End: 2018-06-11

## 2018-06-11 RX ORDER — SODIUM CHLORIDE 0.9 % (FLUSH) 0.9 %
10 SYRINGE (ML) INJECTION PRN
Status: DISCONTINUED | OUTPATIENT
Start: 2018-06-11 | End: 2018-06-11 | Stop reason: HOSPADM

## 2018-06-11 RX ORDER — FENTANYL CITRATE 50 UG/ML
25 INJECTION, SOLUTION INTRAMUSCULAR; INTRAVENOUS
Status: DISCONTINUED | OUTPATIENT
Start: 2018-06-11 | End: 2018-06-11 | Stop reason: HOSPADM

## 2018-06-11 RX ORDER — CALCIUM CHLORIDE 100 MG/ML
INJECTION INTRAVENOUS; INTRAVENTRICULAR PRN
Status: DISCONTINUED | OUTPATIENT
Start: 2018-06-11 | End: 2018-06-11 | Stop reason: SDUPTHER

## 2018-06-11 RX ORDER — HYDROMORPHONE HCL IN 0.9% NACL 0.5 MG/ML
0.25 SYRINGE (ML) INTRAVENOUS EVERY 5 MIN PRN
Status: DISCONTINUED | OUTPATIENT
Start: 2018-06-11 | End: 2018-06-11 | Stop reason: HOSPADM

## 2018-06-11 RX ORDER — SODIUM CHLORIDE 9 MG/ML
INJECTION, SOLUTION INTRAVENOUS CONTINUOUS
Status: DISCONTINUED | OUTPATIENT
Start: 2018-06-11 | End: 2018-06-12

## 2018-06-11 RX ORDER — ONDANSETRON 2 MG/ML
INJECTION INTRAMUSCULAR; INTRAVENOUS PRN
Status: DISCONTINUED | OUTPATIENT
Start: 2018-06-11 | End: 2018-06-11 | Stop reason: SDUPTHER

## 2018-06-11 RX ORDER — LABETALOL HYDROCHLORIDE 5 MG/ML
5 INJECTION, SOLUTION INTRAVENOUS EVERY 10 MIN PRN
Status: DISCONTINUED | OUTPATIENT
Start: 2018-06-11 | End: 2018-06-11 | Stop reason: HOSPADM

## 2018-06-11 RX ORDER — ROCURONIUM BROMIDE 10 MG/ML
INJECTION, SOLUTION INTRAVENOUS PRN
Status: DISCONTINUED | OUTPATIENT
Start: 2018-06-11 | End: 2018-06-11 | Stop reason: SDUPTHER

## 2018-06-11 RX ORDER — SUCCINYLCHOLINE CHLORIDE 20 MG/ML
INJECTION INTRAMUSCULAR; INTRAVENOUS PRN
Status: DISCONTINUED | OUTPATIENT
Start: 2018-06-11 | End: 2018-06-11 | Stop reason: SDUPTHER

## 2018-06-11 RX ORDER — PROMETHAZINE HYDROCHLORIDE 25 MG/ML
6.25 INJECTION, SOLUTION INTRAMUSCULAR; INTRAVENOUS
Status: DISCONTINUED | OUTPATIENT
Start: 2018-06-11 | End: 2018-06-11 | Stop reason: HOSPADM

## 2018-06-11 RX ADMIN — ESMOLOL HYDROCHLORIDE 50 MCG/KG/MIN: 10 INJECTION INTRAVENOUS at 16:46

## 2018-06-11 RX ADMIN — LIDOCAINE HYDROCHLORIDE 50 MG: 10 INJECTION, SOLUTION EPIDURAL; INFILTRATION; INTRACAUDAL; PERINEURAL at 07:43

## 2018-06-11 RX ADMIN — DEXAMETHASONE SODIUM PHOSPHATE 10 MG: 10 INJECTION INTRAMUSCULAR; INTRAVENOUS at 07:40

## 2018-06-11 RX ADMIN — FENTANYL CITRATE 100 MCG: 50 INJECTION, SOLUTION INTRAMUSCULAR; INTRAVENOUS at 07:15

## 2018-06-11 RX ADMIN — HYDRALAZINE HYDROCHLORIDE 10 MG: 20 INJECTION INTRAMUSCULAR; INTRAVENOUS at 13:02

## 2018-06-11 RX ADMIN — VASOPRESSIN 5 UNITS: 20 INJECTION, SOLUTION INTRAMUSCULAR; SUBCUTANEOUS at 11:18

## 2018-06-11 RX ADMIN — SODIUM BICARBONATE 50 MEQ: 84 INJECTION, SOLUTION INTRAVENOUS at 19:31

## 2018-06-11 RX ADMIN — SODIUM CHLORIDE, SODIUM LACTATE, POTASSIUM CHLORIDE, AND CALCIUM CHLORIDE: 600; 310; 30; 20 INJECTION, SOLUTION INTRAVENOUS at 07:50

## 2018-06-11 RX ADMIN — Medication 2 G: at 07:36

## 2018-06-11 RX ADMIN — PHENYLEPHRINE HYDROCHLORIDE 200 MCG: 10 INJECTION INTRAVENOUS at 11:09

## 2018-06-11 RX ADMIN — ROCURONIUM BROMIDE 40 MG: 10 INJECTION INTRAVENOUS at 08:56

## 2018-06-11 RX ADMIN — EPHEDRINE SULFATE 10 MG: 50 INJECTION, SOLUTION INTRAMUSCULAR; INTRAVENOUS; SUBCUTANEOUS at 07:48

## 2018-06-11 RX ADMIN — Medication 2 G: at 23:08

## 2018-06-11 RX ADMIN — CHLORHEXIDINE GLUCONATE 15 ML: 1.2 LIQUID BUCCAL at 20:30

## 2018-06-11 RX ADMIN — Medication 10 ML: at 20:30

## 2018-06-11 RX ADMIN — SODIUM CHLORIDE: 9 INJECTION, SOLUTION INTRAVENOUS at 13:18

## 2018-06-11 RX ADMIN — EPHEDRINE SULFATE 15 MG: 50 INJECTION, SOLUTION INTRAMUSCULAR; INTRAVENOUS; SUBCUTANEOUS at 07:58

## 2018-06-11 RX ADMIN — ROCURONIUM BROMIDE 60 MG: 10 INJECTION INTRAVENOUS at 07:41

## 2018-06-11 RX ADMIN — FAMOTIDINE 20 MG: 10 INJECTION, SOLUTION INTRAVENOUS at 20:30

## 2018-06-11 RX ADMIN — INSULIN LISPRO 3 UNITS: 100 INJECTION, SOLUTION INTRAVENOUS; SUBCUTANEOUS at 18:17

## 2018-06-11 RX ADMIN — ROCURONIUM BROMIDE 40 MG: 10 INJECTION INTRAVENOUS at 12:40

## 2018-06-11 RX ADMIN — ROCURONIUM BROMIDE 60 MG: 10 INJECTION INTRAVENOUS at 12:12

## 2018-06-11 RX ADMIN — HETASTARCH 500 ML: 6 INJECTION, SOLUTION INTRAVENOUS at 10:17

## 2018-06-11 RX ADMIN — FENTANYL CITRATE 50 MCG: 50 INJECTION, SOLUTION INTRAMUSCULAR; INTRAVENOUS at 10:23

## 2018-06-11 RX ADMIN — FENTANYL CITRATE 50 MCG: 50 INJECTION, SOLUTION INTRAMUSCULAR; INTRAVENOUS at 09:50

## 2018-06-11 RX ADMIN — PROPOFOL 50 MG: 10 INJECTION, EMULSION INTRAVENOUS at 12:50

## 2018-06-11 RX ADMIN — PROPOFOL 160 MG: 10 INJECTION, EMULSION INTRAVENOUS at 07:27

## 2018-06-11 RX ADMIN — SODIUM CHLORIDE, POTASSIUM CHLORIDE, SODIUM LACTATE AND CALCIUM CHLORIDE: 600; 310; 30; 20 INJECTION, SOLUTION INTRAVENOUS at 19:46

## 2018-06-11 RX ADMIN — FENTANYL CITRATE 50 MCG: 50 INJECTION, SOLUTION INTRAMUSCULAR; INTRAVENOUS at 08:16

## 2018-06-11 RX ADMIN — Medication 2 MG: at 23:05

## 2018-06-11 RX ADMIN — SODIUM CHLORIDE, POTASSIUM CHLORIDE, SODIUM LACTATE AND CALCIUM CHLORIDE 500 ML: 600; 310; 30; 20 INJECTION, SOLUTION INTRAVENOUS at 22:07

## 2018-06-11 RX ADMIN — Medication 2 MG: at 19:57

## 2018-06-11 RX ADMIN — Medication 160 MG: at 07:27

## 2018-06-11 RX ADMIN — SODIUM CHLORIDE, SODIUM LACTATE, POTASSIUM CHLORIDE, AND CALCIUM CHLORIDE: 600; 310; 30; 20 INJECTION, SOLUTION INTRAVENOUS at 06:19

## 2018-06-11 RX ADMIN — PROPOFOL 25 MCG/KG/MIN: 10 INJECTION, EMULSION INTRAVENOUS at 17:11

## 2018-06-11 RX ADMIN — FENTANYL CITRATE 50 MCG: 50 INJECTION, SOLUTION INTRAMUSCULAR; INTRAVENOUS at 12:40

## 2018-06-11 RX ADMIN — VASOPRESSIN 5 UNITS: 20 INJECTION, SOLUTION INTRAMUSCULAR; SUBCUTANEOUS at 11:25

## 2018-06-11 RX ADMIN — Medication 2 G: at 15:07

## 2018-06-11 RX ADMIN — FAMOTIDINE 20 MG: 10 INJECTION, SOLUTION INTRAVENOUS at 15:07

## 2018-06-11 RX ADMIN — PROPOFOL 25 MCG/KG/MIN: 10 INJECTION, EMULSION INTRAVENOUS at 13:00

## 2018-06-11 RX ADMIN — FENTANYL CITRATE 50 MCG: 50 INJECTION, SOLUTION INTRAMUSCULAR; INTRAVENOUS at 08:57

## 2018-06-11 RX ADMIN — PHENYLEPHRINE HYDROCHLORIDE 200 MCG: 10 INJECTION INTRAVENOUS at 11:15

## 2018-06-11 RX ADMIN — CALCIUM CHLORIDE 0.5 G: 100 INJECTION, SOLUTION INTRAVENOUS at 11:14

## 2018-06-11 RX ADMIN — EPHEDRINE SULFATE 15 MG: 50 INJECTION, SOLUTION INTRAMUSCULAR; INTRAVENOUS; SUBCUTANEOUS at 08:31

## 2018-06-11 RX ADMIN — EPHEDRINE SULFATE 15 MG: 50 INJECTION, SOLUTION INTRAMUSCULAR; INTRAVENOUS; SUBCUTANEOUS at 08:28

## 2018-06-11 RX ADMIN — PROPOFOL 40 MCG/KG/MIN: 10 INJECTION, EMULSION INTRAVENOUS at 22:42

## 2018-06-11 RX ADMIN — HYDROMORPHONE HYDROCHLORIDE 0.4 MG: 1 INJECTION, SOLUTION INTRAMUSCULAR; INTRAVENOUS; SUBCUTANEOUS at 10:52

## 2018-06-11 RX ADMIN — PHENYLEPHRINE HYDROCHLORIDE 200 MCG: 10 INJECTION INTRAVENOUS at 11:12

## 2018-06-11 RX ADMIN — HYDROMORPHONE HYDROCHLORIDE 0.4 MG: 1 INJECTION, SOLUTION INTRAMUSCULAR; INTRAVENOUS; SUBCUTANEOUS at 12:48

## 2018-06-11 RX ADMIN — SUGAMMADEX 300 MG: 100 INJECTION, SOLUTION INTRAVENOUS at 11:03

## 2018-06-11 RX ADMIN — LIDOCAINE HYDROCHLORIDE 50 MG: 10 INJECTION, SOLUTION EPIDURAL; INFILTRATION; INTRACAUDAL; PERINEURAL at 07:27

## 2018-06-11 RX ADMIN — CALCIUM CHLORIDE 0.5 G: 100 INJECTION, SOLUTION INTRAVENOUS at 11:28

## 2018-06-11 RX ADMIN — PROPOFOL 50 MG: 10 INJECTION, EMULSION INTRAVENOUS at 12:40

## 2018-06-11 RX ADMIN — INSULIN LISPRO 3 UNITS: 100 INJECTION, SOLUTION INTRAVENOUS; SUBCUTANEOUS at 15:25

## 2018-06-11 RX ADMIN — CHLORHEXIDINE GLUCONATE 15 ML: 1.2 LIQUID BUCCAL at 15:30

## 2018-06-11 RX ADMIN — HETASTARCH 500 ML: 6 INJECTION, SOLUTION INTRAVENOUS at 10:50

## 2018-06-11 RX ADMIN — EPHEDRINE SULFATE 15 MG: 50 INJECTION, SOLUTION INTRAMUSCULAR; INTRAVENOUS; SUBCUTANEOUS at 11:08

## 2018-06-11 RX ADMIN — HYDROMORPHONE HYDROCHLORIDE 0.2 MG: 1 INJECTION, SOLUTION INTRAMUSCULAR; INTRAVENOUS; SUBCUTANEOUS at 10:13

## 2018-06-11 RX ADMIN — MIDAZOLAM HYDROCHLORIDE 2 MG: 1 INJECTION INTRAMUSCULAR; INTRAVENOUS at 07:06

## 2018-06-11 RX ADMIN — FENTANYL CITRATE 50 MCG: 50 INJECTION, SOLUTION INTRAMUSCULAR; INTRAVENOUS at 09:08

## 2018-06-11 RX ADMIN — SODIUM CHLORIDE, POTASSIUM CHLORIDE, SODIUM LACTATE AND CALCIUM CHLORIDE 500 ML: 600; 310; 30; 20 INJECTION, SOLUTION INTRAVENOUS at 18:46

## 2018-06-11 RX ADMIN — ONDANSETRON HYDROCHLORIDE 4 MG: 2 SOLUTION INTRAMUSCULAR; INTRAVENOUS at 11:03

## 2018-06-11 RX ADMIN — PROPOFOL 50 MG: 10 INJECTION, EMULSION INTRAVENOUS at 12:32

## 2018-06-11 RX ADMIN — SODIUM CHLORIDE: 9 INJECTION, SOLUTION INTRAVENOUS at 11:35

## 2018-06-11 RX ADMIN — EPHEDRINE SULFATE 15 MG: 50 INJECTION, SOLUTION INTRAMUSCULAR; INTRAVENOUS; SUBCUTANEOUS at 11:06

## 2018-06-11 RX ADMIN — EPHEDRINE SULFATE 15 MG: 50 INJECTION, SOLUTION INTRAMUSCULAR; INTRAVENOUS; SUBCUTANEOUS at 08:00

## 2018-06-11 RX ADMIN — FENTANYL CITRATE 50 MCG: 50 INJECTION, SOLUTION INTRAMUSCULAR; INTRAVENOUS at 12:48

## 2018-06-11 RX ADMIN — PROPOFOL 50 MG: 10 INJECTION, EMULSION INTRAVENOUS at 12:45

## 2018-06-11 RX ADMIN — HEPARIN SODIUM 7000 UNITS: 1000 INJECTION, SOLUTION INTRAVENOUS; SUBCUTANEOUS at 08:44

## 2018-06-11 RX ADMIN — FENTANYL CITRATE 50 MCG: 50 INJECTION, SOLUTION INTRAMUSCULAR; INTRAVENOUS at 10:53

## 2018-06-11 RX ADMIN — SODIUM CHLORIDE: 9 INJECTION, SOLUTION INTRAVENOUS at 12:30

## 2018-06-11 RX ADMIN — MIDAZOLAM 2 MG: 1 INJECTION INTRAMUSCULAR; INTRAVENOUS at 07:06

## 2018-06-11 ASSESSMENT — ENCOUNTER SYMPTOMS: SHORTNESS OF BREATH: 0

## 2018-06-11 ASSESSMENT — PULMONARY FUNCTION TESTS
PIF_VALUE: 27.7
PIF_VALUE: 23.4
PIF_VALUE: 23.9
PIF_VALUE: 23.9
PIF_VALUE: 25.4
PIF_VALUE: 23.9
PIF_VALUE: 24.4
PIF_VALUE: 28.3
PIF_VALUE: 23.5
PIF_VALUE: 24.3

## 2018-06-11 ASSESSMENT — PAIN SCALES - GENERAL
PAINLEVEL_OUTOF10: 0
PAINLEVEL_OUTOF10: 4
PAINLEVEL_OUTOF10: 0
PAINLEVEL_OUTOF10: 0
PAINLEVEL_OUTOF10: 4
PAINLEVEL_OUTOF10: 0

## 2018-06-11 ASSESSMENT — PAIN - FUNCTIONAL ASSESSMENT: PAIN_FUNCTIONAL_ASSESSMENT: 0-10

## 2018-06-11 ASSESSMENT — LIFESTYLE VARIABLES: SMOKING_STATUS: 1

## 2018-06-12 ENCOUNTER — ANESTHESIA (OUTPATIENT)
Dept: OPERATING ROOM | Age: 73
DRG: 268 | End: 2018-06-12
Payer: MEDICARE

## 2018-06-12 ENCOUNTER — ANESTHESIA EVENT (OUTPATIENT)
Dept: OPERATING ROOM | Age: 73
DRG: 268 | End: 2018-06-12
Payer: MEDICARE

## 2018-06-12 ENCOUNTER — APPOINTMENT (OUTPATIENT)
Dept: INTERVENTIONAL RADIOLOGY/VASCULAR | Age: 73
DRG: 268 | End: 2018-06-12
Attending: SURGERY
Payer: MEDICARE

## 2018-06-12 ENCOUNTER — APPOINTMENT (OUTPATIENT)
Dept: GENERAL RADIOLOGY | Age: 73
DRG: 268 | End: 2018-06-12
Attending: SURGERY
Payer: MEDICARE

## 2018-06-12 ENCOUNTER — APPOINTMENT (OUTPATIENT)
Dept: ULTRASOUND IMAGING | Age: 73
DRG: 268 | End: 2018-06-12
Attending: SURGERY
Payer: MEDICARE

## 2018-06-12 VITALS
SYSTOLIC BLOOD PRESSURE: 106 MMHG | RESPIRATION RATE: 2 BRPM | DIASTOLIC BLOOD PRESSURE: 39 MMHG | OXYGEN SATURATION: 100 %

## 2018-06-12 PROBLEM — E87.5 HYPERKALEMIA: Status: ACTIVE | Noted: 2018-06-12

## 2018-06-12 PROBLEM — N17.9 ACUTE RENAL FAILURE (ARF) (HCC): Status: ACTIVE | Noted: 2018-06-12

## 2018-06-12 LAB
ALBUMIN SERPL-MCNC: 2.8 G/DL (ref 3.5–5.2)
ALP BLD-CCNC: 45 U/L (ref 40–130)
ALT SERPL-CCNC: 58 U/L (ref 5–41)
ANION GAP SERPL CALCULATED.3IONS-SCNC: 14 MMOL/L (ref 7–19)
ANION GAP SERPL CALCULATED.3IONS-SCNC: 17 MMOL/L (ref 7–19)
AST SERPL-CCNC: 158 U/L (ref 5–40)
BASE EXCESS ARTERIAL: -4.8 MMOL/L (ref -2–2)
BASE EXCESS ARTERIAL: -9 (ref -3–3)
BILIRUB SERPL-MCNC: 0.3 MG/DL (ref 0.2–1.2)
BLOOD BANK DISPENSE STATUS: NORMAL
BLOOD BANK PRODUCT CODE: NORMAL
BPU ID: NORMAL
BUN BLDV-MCNC: 28 MG/DL (ref 8–23)
BUN BLDV-MCNC: 30 MG/DL (ref 8–23)
CALCIUM IONIZED: 0.96 MMOL/L (ref 1.1–1.3)
CALCIUM SERPL-MCNC: 7.6 MG/DL (ref 8.8–10.2)
CALCIUM SERPL-MCNC: 7.8 MG/DL (ref 8.8–10.2)
CARBOXYHEMOGLOBIN ARTERIAL: 1.8 % (ref 0–5)
CHLORIDE BLD-SCNC: 106 MMOL/L (ref 98–111)
CHLORIDE BLD-SCNC: 107 MMOL/L (ref 98–111)
CO2: 17 MEQ/L (ref 21–32)
CO2: 17 MMOL/L (ref 22–29)
CO2: 19 MMOL/L (ref 22–29)
CREAT SERPL-MCNC: 2.4 MG/DL (ref 0.5–1.2)
CREAT SERPL-MCNC: 2.9 MG/DL (ref 0.5–1.2)
CREATININE URINE: 80.6 MG/DL (ref 4.2–622)
DESCRIPTION BLOOD BANK: NORMAL
EOSINOPHIL,URINE: NORMAL
GFR NON-AFRICAN AMERICAN: 20
GFR NON-AFRICAN AMERICAN: 22
GFR NON-AFRICAN AMERICAN: 27
GLUCOSE BLD-MCNC: 106 MG/DL (ref 70–99)
GLUCOSE BLD-MCNC: 113 MG/DL (ref 70–99)
GLUCOSE BLD-MCNC: 114 MG/DL (ref 70–99)
GLUCOSE BLD-MCNC: 119 MG/DL (ref 74–109)
GLUCOSE BLD-MCNC: 126 MG/DL (ref 74–109)
GLUCOSE BLD-MCNC: 129 MG/DL (ref 70–99)
GLUCOSE BLD-MCNC: 135 MG/DL (ref 70–99)
GLUCOSE BLD-MCNC: 137 MG/DL (ref 70–99)
GLUCOSE BLD-MCNC: 307 MG/DL (ref 70–99)
HCO3 ARTERIAL: 19.7 MMOL/L (ref 22–26)
HCT VFR BLD CALC: 37 % (ref 42–52)
HEMOGLOBIN, ART, EXTENDED: 12.5 G/DL (ref 14–18)
HEMOGLOBIN: 12.6 G/DL (ref 14–18)
HEMOGLOBIN: 8.3 GM/DL (ref 12–18)
LACTIC ACID: 2.1 MMOL/L (ref 0.5–1.9)
MAGNESIUM: 2.5 MG/DL (ref 1.6–2.4)
MCH RBC QN AUTO: 29.9 PG (ref 27–31)
MCHC RBC AUTO-ENTMCNC: 34.1 G/DL (ref 33–37)
MCV RBC AUTO: 87.7 FL (ref 80–94)
METHEMOGLOBIN ARTERIAL: 0.9 %
O2 CONTENT ARTERIAL: 17.1 ML/DL
O2 SAT, ARTERIAL: 100 % (ref 93–100)
O2 SAT, ARTERIAL: 96.4 %
O2 THERAPY: ABNORMAL
OSMOLALITY URINE: 341 MOSM/KG (ref 250–1200)
PCO2 ARTERIAL: 34 MMHG (ref 35–45)
PCO2 ARTERIAL: 40 MM HG (ref 35–48)
PDW BLD-RTO: 15.6 % (ref 11.5–14.5)
PERFORMED ON: ABNORMAL
PH ARTERIAL: 7.25 (ref 7.3–7.5)
PH ARTERIAL: 7.37 (ref 7.35–7.45)
PLATELET # BLD: 118 K/UL (ref 130–400)
PMV BLD AUTO: 10.7 FL (ref 9.4–12.4)
PO2 ARTERIAL: 103 MMHG (ref 80–100)
PO2 ARTERIAL: 480 MM HG (ref 83–108)
POC ANION GAP: 12
POC CHLORIDE: 115 MEQ/L (ref 99–110)
POC CREATININE: 3.1 MG/DL (ref 0.3–1.3)
POC HEMATOCRIT: 24 % (ref 37–52)
POC POTASSIUM: 4.6 MEQ/L (ref 3.5–5.1)
POC SAMPLE TYPE: ABNORMAL
POC SODIUM: 144 MEQ/L (ref 136–145)
POTASSIUM SERPL-SCNC: 5.6 MMOL/L (ref 3.5–5)
POTASSIUM SERPL-SCNC: 5.8 MMOL/L (ref 3.5–5)
POTASSIUM, WHOLE BLOOD: 5.4
RBC # BLD: 4.22 M/UL (ref 4.7–6.1)
SODIUM BLD-SCNC: 139 MMOL/L (ref 136–145)
SODIUM BLD-SCNC: 141 MMOL/L (ref 136–145)
SODIUM URINE: 33 MMOL/L
TCO2 ARTERIAL: 19 MMOL/L
TOTAL PROTEIN: 4.5 G/DL (ref 6.6–8.7)
VITAMIN D 25-HYDROXY: 14.6 NG/ML
WBC # BLD: 15.2 K/UL (ref 4.8–10.8)

## 2018-06-12 PROCEDURE — 2580000003 HC RX 258: Performed by: CLINICAL NURSE SPECIALIST

## 2018-06-12 PROCEDURE — 3609008400 HC SIGMOIDOSCOPY DIAGNOSTIC: Performed by: INTERNAL MEDICINE

## 2018-06-12 PROCEDURE — 99222 1ST HOSP IP/OBS MODERATE 55: CPT | Performed by: INTERNAL MEDICINE

## 2018-06-12 PROCEDURE — 83935 ASSAY OF URINE OSMOLALITY: CPT

## 2018-06-12 PROCEDURE — 84132 ASSAY OF SERUM POTASSIUM: CPT

## 2018-06-12 PROCEDURE — 36592 COLLECT BLOOD FROM PICC: CPT

## 2018-06-12 PROCEDURE — 2500000003 HC RX 250 WO HCPCS: Performed by: INTERNAL MEDICINE

## 2018-06-12 PROCEDURE — P9047 ALBUMIN (HUMAN), 25%, 50ML: HCPCS | Performed by: INTERNAL MEDICINE

## 2018-06-12 PROCEDURE — 99233 SBSQ HOSP IP/OBS HIGH 50: CPT | Performed by: HOSPITALIST

## 2018-06-12 PROCEDURE — 71045 X-RAY EXAM CHEST 1 VIEW: CPT

## 2018-06-12 PROCEDURE — C1769 GUIDE WIRE: HCPCS | Performed by: SURGERY

## 2018-06-12 PROCEDURE — 45330 DIAGNOSTIC SIGMOIDOSCOPY: CPT | Performed by: INTERNAL MEDICINE

## 2018-06-12 PROCEDURE — S0028 INJECTION, FAMOTIDINE, 20 MG: HCPCS | Performed by: FAMILY MEDICINE

## 2018-06-12 PROCEDURE — 94003 VENT MGMT INPAT SUBQ DAY: CPT

## 2018-06-12 PROCEDURE — 2720000010 HC SURG SUPPLY STERILE: Performed by: SURGERY

## 2018-06-12 PROCEDURE — 82570 ASSAY OF URINE CREATININE: CPT

## 2018-06-12 PROCEDURE — 6360000002 HC RX W HCPCS: Performed by: INTERNAL MEDICINE

## 2018-06-12 PROCEDURE — 8090000000 HC CONTINUOUS VENOVENOUS HEMOFIL

## 2018-06-12 PROCEDURE — 2700000000 HC OXYGEN THERAPY PER DAY

## 2018-06-12 PROCEDURE — 83605 ASSAY OF LACTIC ACID: CPT

## 2018-06-12 PROCEDURE — 0JH63XZ INSERTION OF TUNNELED VASCULAR ACCESS DEVICE INTO CHEST SUBCUTANEOUS TISSUE AND FASCIA, PERCUTANEOUS APPROACH: ICD-10-PCS | Performed by: SURGERY

## 2018-06-12 PROCEDURE — 2500000003 HC RX 250 WO HCPCS: Performed by: FAMILY MEDICINE

## 2018-06-12 PROCEDURE — 82330 ASSAY OF CALCIUM: CPT

## 2018-06-12 PROCEDURE — 77001 FLUOROGUIDE FOR VEIN DEVICE: CPT | Performed by: SURGERY

## 2018-06-12 PROCEDURE — 2500000003 HC RX 250 WO HCPCS: Performed by: CLINICAL NURSE SPECIALIST

## 2018-06-12 PROCEDURE — 6370000000 HC RX 637 (ALT 250 FOR IP): Performed by: INTERNAL MEDICINE

## 2018-06-12 PROCEDURE — 0WJG0ZZ INSPECTION OF PERITONEAL CAVITY, OPEN APPROACH: ICD-10-PCS | Performed by: SURGERY

## 2018-06-12 PROCEDURE — 82810 BLOOD GASES O2 SAT ONLY: CPT

## 2018-06-12 PROCEDURE — 84300 ASSAY OF URINE SODIUM: CPT

## 2018-06-12 PROCEDURE — 82800 BLOOD PH: CPT

## 2018-06-12 PROCEDURE — 99221 1ST HOSP IP/OBS SF/LOW 40: CPT | Performed by: SURGERY

## 2018-06-12 PROCEDURE — 2580000003 HC RX 258: Performed by: SURGERY

## 2018-06-12 PROCEDURE — 6360000002 HC RX W HCPCS: Performed by: SURGERY

## 2018-06-12 PROCEDURE — 2500000003 HC RX 250 WO HCPCS

## 2018-06-12 PROCEDURE — 82435 ASSAY OF BLOOD CHLORIDE: CPT

## 2018-06-12 PROCEDURE — 36600 WITHDRAWAL OF ARTERIAL BLOOD: CPT

## 2018-06-12 PROCEDURE — 2500000003 HC RX 250 WO HCPCS: Performed by: NURSE ANESTHETIST, CERTIFIED REGISTERED

## 2018-06-12 PROCEDURE — 2580000003 HC RX 258: Performed by: INTERNAL MEDICINE

## 2018-06-12 PROCEDURE — 86706 HEP B SURFACE ANTIBODY: CPT

## 2018-06-12 PROCEDURE — 85027 COMPLETE CBC AUTOMATED: CPT

## 2018-06-12 PROCEDURE — C1881 DIALYSIS ACCESS SYSTEM: HCPCS | Performed by: SURGERY

## 2018-06-12 PROCEDURE — 2000000000 HC ICU R&B

## 2018-06-12 PROCEDURE — 84295 ASSAY OF SERUM SODIUM: CPT

## 2018-06-12 PROCEDURE — 3700000001 HC ADD 15 MINUTES (ANESTHESIA): Performed by: SURGERY

## 2018-06-12 PROCEDURE — 3600000004 HC SURGERY LEVEL 4 BASE: Performed by: SURGERY

## 2018-06-12 PROCEDURE — 49000 EXPLORATION OF ABDOMEN: CPT | Performed by: SURGERY

## 2018-06-12 PROCEDURE — 82948 REAGENT STRIP/BLOOD GLUCOSE: CPT

## 2018-06-12 PROCEDURE — 80074 ACUTE HEPATITIS PANEL: CPT

## 2018-06-12 PROCEDURE — 76770 US EXAM ABDO BACK WALL COMP: CPT

## 2018-06-12 PROCEDURE — 2720000001 HC MISC SURG SUPPLY STERILE $51-500: Performed by: SURGERY

## 2018-06-12 PROCEDURE — 85014 HEMATOCRIT: CPT

## 2018-06-12 PROCEDURE — 82374 ASSAY BLOOD CARBON DIOXIDE: CPT

## 2018-06-12 PROCEDURE — 89050 BODY FLUID CELL COUNT: CPT

## 2018-06-12 PROCEDURE — 82565 ASSAY OF CREATININE: CPT

## 2018-06-12 PROCEDURE — 3600000014 HC SURGERY LEVEL 4 ADDTL 15MIN: Performed by: SURGERY

## 2018-06-12 PROCEDURE — 3700000000 HC ANESTHESIA ATTENDED CARE: Performed by: SURGERY

## 2018-06-12 PROCEDURE — 6360000002 HC RX W HCPCS: Performed by: NURSE ANESTHETIST, CERTIFIED REGISTERED

## 2018-06-12 PROCEDURE — 99024 POSTOP FOLLOW-UP VISIT: CPT | Performed by: SURGERY

## 2018-06-12 PROCEDURE — 2580000003 HC RX 258: Performed by: NURSE ANESTHETIST, CERTIFIED REGISTERED

## 2018-06-12 PROCEDURE — 82306 VITAMIN D 25 HYDROXY: CPT

## 2018-06-12 PROCEDURE — 02HV33Z INSERTION OF INFUSION DEVICE INTO SUPERIOR VENA CAVA, PERCUTANEOUS APPROACH: ICD-10-PCS | Performed by: SURGERY

## 2018-06-12 PROCEDURE — 2500000003 HC RX 250 WO HCPCS: Performed by: HOSPITALIST

## 2018-06-12 PROCEDURE — 5A1D80Z PERFORMANCE OF URINARY FILTRATION, PROLONGED INTERMITTENT, 6-18 HOURS PER DAY: ICD-10-PCS | Performed by: INTERNAL MEDICINE

## 2018-06-12 PROCEDURE — 82803 BLOOD GASES ANY COMBINATION: CPT

## 2018-06-12 PROCEDURE — P9016 RBC LEUKOCYTES REDUCED: HCPCS

## 2018-06-12 PROCEDURE — 0DJD8ZZ INSPECTION OF LOWER INTESTINAL TRACT, VIA NATURAL OR ARTIFICIAL OPENING ENDOSCOPIC: ICD-10-PCS | Performed by: INTERNAL MEDICINE

## 2018-06-12 PROCEDURE — 83735 ASSAY OF MAGNESIUM: CPT

## 2018-06-12 PROCEDURE — 80053 COMPREHEN METABOLIC PANEL: CPT

## 2018-06-12 RX ORDER — SODIUM CHLORIDE 9 MG/ML
INJECTION, SOLUTION INTRAVENOUS CONTINUOUS
Status: DISCONTINUED | OUTPATIENT
Start: 2018-06-12 | End: 2018-06-20 | Stop reason: HOSPADM

## 2018-06-12 RX ORDER — CALCIUM CHLORIDE 100 MG/ML
INJECTION INTRAVENOUS; INTRAVENTRICULAR PRN
Status: DISCONTINUED | OUTPATIENT
Start: 2018-06-12 | End: 2018-06-12 | Stop reason: SDUPTHER

## 2018-06-12 RX ORDER — SODIUM CHLORIDE, SODIUM LACTATE, POTASSIUM CHLORIDE, AND CALCIUM CHLORIDE .6; .31; .03; .02 G/100ML; G/100ML; G/100ML; G/100ML
500 INJECTION, SOLUTION INTRAVENOUS ONCE
Status: COMPLETED | OUTPATIENT
Start: 2018-06-12 | End: 2018-06-12

## 2018-06-12 RX ORDER — MIDAZOLAM HYDROCHLORIDE 1 MG/ML
INJECTION INTRAMUSCULAR; INTRAVENOUS PRN
Status: DISCONTINUED | OUTPATIENT
Start: 2018-06-12 | End: 2018-06-12 | Stop reason: SDUPTHER

## 2018-06-12 RX ORDER — ROCURONIUM BROMIDE 10 MG/ML
INJECTION, SOLUTION INTRAVENOUS PRN
Status: DISCONTINUED | OUTPATIENT
Start: 2018-06-12 | End: 2018-06-12 | Stop reason: SDUPTHER

## 2018-06-12 RX ORDER — ALBUMIN (HUMAN) 12.5 G/50ML
25 SOLUTION INTRAVENOUS ONCE
Status: COMPLETED | OUTPATIENT
Start: 2018-06-12 | End: 2018-06-12

## 2018-06-12 RX ORDER — FENTANYL CITRATE 50 UG/ML
INJECTION, SOLUTION INTRAMUSCULAR; INTRAVENOUS PRN
Status: DISCONTINUED | OUTPATIENT
Start: 2018-06-12 | End: 2018-06-12 | Stop reason: SDUPTHER

## 2018-06-12 RX ORDER — PROPOFOL 10 MG/ML
10 INJECTION, EMULSION INTRAVENOUS
Status: DISCONTINUED | OUTPATIENT
Start: 2018-06-12 | End: 2018-06-18

## 2018-06-12 RX ORDER — 0.9 % SODIUM CHLORIDE 0.9 %
10 VIAL (ML) INJECTION PRN
Status: DISCONTINUED | OUTPATIENT
Start: 2018-06-12 | End: 2018-06-20 | Stop reason: HOSPADM

## 2018-06-12 RX ORDER — 0.9 % SODIUM CHLORIDE 0.9 %
10 VIAL (ML) INJECTION EVERY 12 HOURS SCHEDULED
Status: DISCONTINUED | OUTPATIENT
Start: 2018-06-12 | End: 2018-06-20 | Stop reason: HOSPADM

## 2018-06-12 RX ORDER — DEXTROSE MONOHYDRATE 25 G/50ML
25 INJECTION, SOLUTION INTRAVENOUS ONCE
Status: COMPLETED | OUTPATIENT
Start: 2018-06-12 | End: 2018-06-12

## 2018-06-12 RX ORDER — SODIUM CHLORIDE, SODIUM LACTATE, POTASSIUM CHLORIDE, CALCIUM CHLORIDE 600; 310; 30; 20 MG/100ML; MG/100ML; MG/100ML; MG/100ML
INJECTION, SOLUTION INTRAVENOUS CONTINUOUS PRN
Status: DISCONTINUED | OUTPATIENT
Start: 2018-06-12 | End: 2018-06-12 | Stop reason: SDUPTHER

## 2018-06-12 RX ORDER — SODIUM CHLORIDE, SODIUM GLUCONATE, SODIUM ACETATE, POTASSIUM CHLORIDE AND MAGNESIUM CHLORIDE 526; 502; 368; 37; 30 MG/100ML; MG/100ML; MG/100ML; MG/100ML; MG/100ML
INJECTION, SOLUTION INTRAVENOUS CONTINUOUS PRN
Status: DISCONTINUED | OUTPATIENT
Start: 2018-06-12 | End: 2018-06-12 | Stop reason: SDUPTHER

## 2018-06-12 RX ORDER — ESMOLOL HYDROCHLORIDE 10 MG/ML
50 INJECTION, SOLUTION INTRAVENOUS CONTINUOUS
Status: DISCONTINUED | OUTPATIENT
Start: 2018-06-12 | End: 2018-06-18

## 2018-06-12 RX ADMIN — ROCURONIUM BROMIDE 50 MG: 10 INJECTION INTRAVENOUS at 12:28

## 2018-06-12 RX ADMIN — HYDRALAZINE HYDROCHLORIDE 10 MG: 20 INJECTION INTRAMUSCULAR; INTRAVENOUS at 14:51

## 2018-06-12 RX ADMIN — SODIUM BICARBONATE: 84 INJECTION, SOLUTION INTRAVENOUS at 09:11

## 2018-06-12 RX ADMIN — DEXTROSE MONOHYDRATE 25 G: 25 INJECTION, SOLUTION INTRAVENOUS at 11:43

## 2018-06-12 RX ADMIN — PROPOFOL 40 MCG/KG/MIN: 10 INJECTION, EMULSION INTRAVENOUS at 11:32

## 2018-06-12 RX ADMIN — ALBUMIN (HUMAN) 25 G: 0.25 INJECTION, SOLUTION INTRAVENOUS at 02:20

## 2018-06-12 RX ADMIN — PROPOFOL 40 MCG/KG/MIN: 10 INJECTION, EMULSION INTRAVENOUS at 21:33

## 2018-06-12 RX ADMIN — CALCIUM CHLORIDE 0.5 G: 100 INJECTION, SOLUTION INTRAVENOUS at 13:08

## 2018-06-12 RX ADMIN — METRONIDAZOLE 500 MG: 500 INJECTION, SOLUTION INTRAVENOUS at 23:31

## 2018-06-12 RX ADMIN — INSULIN HUMAN 10 UNITS: 100 INJECTION, SOLUTION PARENTERAL at 11:43

## 2018-06-12 RX ADMIN — CHLORHEXIDINE GLUCONATE 15 ML: 1.2 LIQUID BUCCAL at 20:23

## 2018-06-12 RX ADMIN — Medication 2 MG: at 07:12

## 2018-06-12 RX ADMIN — SODIUM CHLORIDE, POTASSIUM CHLORIDE, SODIUM LACTATE AND CALCIUM CHLORIDE 500 ML: 600; 310; 30; 20 INJECTION, SOLUTION INTRAVENOUS at 01:40

## 2018-06-12 RX ADMIN — FENTANYL CITRATE 50 MCG: 50 INJECTION, SOLUTION INTRAMUSCULAR; INTRAVENOUS at 12:54

## 2018-06-12 RX ADMIN — Medication 10 ML: at 20:24

## 2018-06-12 RX ADMIN — MIDAZOLAM HYDROCHLORIDE 4 MG: 1 INJECTION, SOLUTION INTRAMUSCULAR; INTRAVENOUS at 12:27

## 2018-06-12 RX ADMIN — CHLORHEXIDINE GLUCONATE 15 ML: 1.2 LIQUID BUCCAL at 09:00

## 2018-06-12 RX ADMIN — Medication 2 MG: at 23:12

## 2018-06-12 RX ADMIN — PROPOFOL 40 MCG/KG/MIN: 10 INJECTION, EMULSION INTRAVENOUS at 16:48

## 2018-06-12 RX ADMIN — FENTANYL CITRATE 50 MCG: 50 INJECTION, SOLUTION INTRAMUSCULAR; INTRAVENOUS at 12:51

## 2018-06-12 RX ADMIN — SODIUM CHLORIDE, SODIUM LACTATE, POTASSIUM CHLORIDE, AND CALCIUM CHLORIDE: 600; 310; 30; 20 INJECTION, SOLUTION INTRAVENOUS at 12:27

## 2018-06-12 RX ADMIN — FAMOTIDINE 20 MG: 10 INJECTION, SOLUTION INTRAVENOUS at 09:11

## 2018-06-12 RX ADMIN — ALBUMIN (HUMAN) 25 G: 0.25 INJECTION, SOLUTION INTRAVENOUS at 05:48

## 2018-06-12 RX ADMIN — Medication 2 MG: at 03:29

## 2018-06-12 RX ADMIN — FENTANYL CITRATE 50 MCG: 50 INJECTION, SOLUTION INTRAMUSCULAR; INTRAVENOUS at 14:14

## 2018-06-12 RX ADMIN — Medication 2 MG: at 14:51

## 2018-06-12 RX ADMIN — PROPOFOL 40 MCG/KG/MIN: 10 INJECTION, EMULSION INTRAVENOUS at 07:50

## 2018-06-12 RX ADMIN — PROPOFOL 40 MCG/KG/MIN: 10 INJECTION, EMULSION INTRAVENOUS at 02:18

## 2018-06-12 RX ADMIN — Medication 10 ML: at 20:23

## 2018-06-12 RX ADMIN — SODIUM CHLORIDE, POTASSIUM CHLORIDE, SODIUM LACTATE AND CALCIUM CHLORIDE: 600; 310; 30; 20 INJECTION, SOLUTION INTRAVENOUS at 02:40

## 2018-06-12 RX ADMIN — SODIUM CHLORIDE, SODIUM GLUCONATE, SODIUM ACETATE, POTASSIUM CHLORIDE AND MAGNESIUM CHLORIDE: 526; 502; 368; 37; 30 INJECTION, SOLUTION INTRAVENOUS at 12:59

## 2018-06-12 RX ADMIN — METRONIDAZOLE 500 MG: 500 INJECTION, SOLUTION INTRAVENOUS at 16:48

## 2018-06-12 RX ADMIN — CALCIUM CHLORIDE 0.33 G: 100 INJECTION INTRAVENOUS; INTRAVENTRICULAR at 11:43

## 2018-06-12 RX ADMIN — SODIUM CHLORIDE 150 ML/HR: 9 INJECTION, SOLUTION INTRAVENOUS at 06:04

## 2018-06-12 ASSESSMENT — PULMONARY FUNCTION TESTS
PIF_VALUE: 24.8
PIF_VALUE: 7.1
PIF_VALUE: 23.5
PIF_VALUE: 25.5
PIF_VALUE: 22.3
PIF_VALUE: 24.1
PIF_VALUE: 25
PIF_VALUE: 24.2
PIF_VALUE: 24.5
PIF_VALUE: 24.7
PIF_VALUE: 24
PIF_VALUE: 18.4
PIF_VALUE: 23.8
PIF_VALUE: 22.6
PIF_VALUE: 23.7
PIF_VALUE: 25.5

## 2018-06-12 ASSESSMENT — PAIN SCALES - GENERAL
PAINLEVEL_OUTOF10: 4
PAINLEVEL_OUTOF10: 4
PAINLEVEL_OUTOF10: 0
PAINLEVEL_OUTOF10: 4
PAINLEVEL_OUTOF10: 7

## 2018-06-12 ASSESSMENT — LIFESTYLE VARIABLES: SMOKING_STATUS: 1

## 2018-06-12 ASSESSMENT — ENCOUNTER SYMPTOMS: SHORTNESS OF BREATH: 0

## 2018-06-13 ENCOUNTER — APPOINTMENT (OUTPATIENT)
Dept: GENERAL RADIOLOGY | Age: 73
DRG: 268 | End: 2018-06-13
Attending: SURGERY
Payer: MEDICARE

## 2018-06-13 LAB
ANION GAP SERPL CALCULATED.3IONS-SCNC: 13 MMOL/L (ref 7–19)
ANISOCYTOSIS: ABNORMAL
ATYPICAL LYMPHOCYTE RELATIVE PERCENT: 1 % (ref 0–8)
BANDED NEUTROPHILS RELATIVE PERCENT: 19 % (ref 0–5)
BASE EXCESS ARTERIAL: 5.3 MMOL/L (ref -2–2)
BASE EXCESS ARTERIAL: 6.4 MMOL/L (ref -2–2)
BASOPHILS ABSOLUTE: 0 K/UL (ref 0–0.2)
BASOPHILS MANUAL: 0 %
BASOPHILS RELATIVE PERCENT: 0 % (ref 0–1)
BUN BLDV-MCNC: 6 MG/DL (ref 8–23)
CALCIUM SERPL-MCNC: 7.2 MG/DL (ref 8.8–10.2)
CARBOXYHEMOGLOBIN ARTERIAL: 1.7 % (ref 0–5)
CARBOXYHEMOGLOBIN ARTERIAL: 2 % (ref 0–5)
CHLORIDE BLD-SCNC: 96 MMOL/L (ref 98–111)
CO2: 26 MMOL/L (ref 22–29)
CREAT SERPL-MCNC: 1.1 MG/DL (ref 0.5–1.2)
EOSINOPHILS ABSOLUTE: 0 K/UL (ref 0–0.6)
EOSINOPHILS RELATIVE PERCENT: 0 % (ref 0–5)
GFR NON-AFRICAN AMERICAN: >60
GLUCOSE BLD-MCNC: 106 MG/DL (ref 70–99)
GLUCOSE BLD-MCNC: 114 MG/DL (ref 74–109)
GLUCOSE BLD-MCNC: 117 MG/DL (ref 70–99)
GLUCOSE BLD-MCNC: 76 MG/DL (ref 70–99)
GLUCOSE BLD-MCNC: 79 MG/DL (ref 70–99)
GLUCOSE BLD-MCNC: 83 MG/DL (ref 70–99)
GLUCOSE BLD-MCNC: 83 MG/DL (ref 70–99)
GLUCOSE BLD-MCNC: 93 MG/DL (ref 70–99)
HAV IGM SER IA-ACNC: NORMAL
HBV SURFACE AB TITR SER: NORMAL {TITER}
HCO3 ARTERIAL: 29 MMOL/L (ref 22–26)
HCO3 ARTERIAL: 29.2 MMOL/L (ref 22–26)
HCT VFR BLD CALC: 35.4 % (ref 42–52)
HEMOGLOBIN, ART, EXTENDED: 11.4 G/DL (ref 14–18)
HEMOGLOBIN, ART, EXTENDED: 12.2 G/DL (ref 14–18)
HEMOGLOBIN: 12.5 G/DL (ref 14–18)
HEPATITIS B CORE IGM ANTIBODY: NORMAL
HEPATITIS B SURFACE ANTIGEN INTERPRETATION: NORMAL
HEPATITIS C ANTIBODY INTERPRETATION: NORMAL
INR BLD: 1.16 (ref 0.88–1.18)
LYMPHOCYTES ABSOLUTE: 1.3 K/UL (ref 1.1–4.5)
LYMPHOCYTES RELATIVE PERCENT: 7 % (ref 20–40)
MCH RBC QN AUTO: 30.9 PG (ref 27–31)
MCHC RBC AUTO-ENTMCNC: 35.3 G/DL (ref 33–37)
MCV RBC AUTO: 87.4 FL (ref 80–94)
METHEMOGLOBIN ARTERIAL: 1.3 %
METHEMOGLOBIN ARTERIAL: 1.3 %
MONOCYTES ABSOLUTE: 0 K/UL (ref 0–0.9)
MONOCYTES RELATIVE PERCENT: 0 % (ref 0–10)
NEUTROPHILS ABSOLUTE: 15 K/UL (ref 1.5–7.5)
NEUTROPHILS MANUAL: 73 %
NEUTROPHILS RELATIVE PERCENT: 73 % (ref 50–65)
O2 CONTENT ARTERIAL: 15.3 ML/DL
O2 CONTENT ARTERIAL: 16.3 ML/DL
O2 SAT, ARTERIAL: 94.5 %
O2 SAT, ARTERIAL: 94.9 %
O2 THERAPY: ABNORMAL
O2 THERAPY: ABNORMAL
PCO2 ARTERIAL: 35 MMHG (ref 35–45)
PCO2 ARTERIAL: 38 MMHG (ref 35–45)
PDW BLD-RTO: 16.2 % (ref 11.5–14.5)
PERFORMED ON: ABNORMAL
PERFORMED ON: ABNORMAL
PERFORMED ON: NORMAL
PH ARTERIAL: 7.49 (ref 7.35–7.45)
PH ARTERIAL: 7.53 (ref 7.35–7.45)
PLATELET # BLD: 71 K/UL (ref 130–400)
PLATELET SLIDE REVIEW: ABNORMAL
PMV BLD AUTO: 12 FL (ref 9.4–12.4)
PO2 ARTERIAL: 72 MMHG (ref 80–100)
PO2 ARTERIAL: 76 MMHG (ref 80–100)
POTASSIUM REFLEX MAGNESIUM: 3.6 MMOL/L (ref 3.5–5)
POTASSIUM, WHOLE BLOOD: 3.3
POTASSIUM, WHOLE BLOOD: 3.6
PROTHROMBIN TIME: 14.7 SEC (ref 12–14.6)
RBC # BLD: 4.05 M/UL (ref 4.7–6.1)
SODIUM BLD-SCNC: 135 MMOL/L (ref 136–145)
WBC # BLD: 16.3 K/UL (ref 4.8–10.8)

## 2018-06-13 PROCEDURE — 82803 BLOOD GASES ANY COMBINATION: CPT

## 2018-06-13 PROCEDURE — 5A1D70Z PERFORMANCE OF URINARY FILTRATION, INTERMITTENT, LESS THAN 6 HOURS PER DAY: ICD-10-PCS | Performed by: INTERNAL MEDICINE

## 2018-06-13 PROCEDURE — 80048 BASIC METABOLIC PNL TOTAL CA: CPT

## 2018-06-13 PROCEDURE — 36592 COLLECT BLOOD FROM PICC: CPT

## 2018-06-13 PROCEDURE — 6370000000 HC RX 637 (ALT 250 FOR IP): Performed by: SURGERY

## 2018-06-13 PROCEDURE — 8010000000 HC HEMODIALYSIS ACUTE INPT

## 2018-06-13 PROCEDURE — 2500000003 HC RX 250 WO HCPCS: Performed by: FAMILY MEDICINE

## 2018-06-13 PROCEDURE — 99024 POSTOP FOLLOW-UP VISIT: CPT | Performed by: SURGERY

## 2018-06-13 PROCEDURE — 84132 ASSAY OF SERUM POTASSIUM: CPT

## 2018-06-13 PROCEDURE — 6360000002 HC RX W HCPCS: Performed by: SURGERY

## 2018-06-13 PROCEDURE — 2000000000 HC ICU R&B

## 2018-06-13 PROCEDURE — 2580000003 HC RX 258: Performed by: INTERNAL MEDICINE

## 2018-06-13 PROCEDURE — 2580000003 HC RX 258: Performed by: HOSPITALIST

## 2018-06-13 PROCEDURE — G0257 UNSCHED DIALYSIS ESRD PT HOS: HCPCS

## 2018-06-13 PROCEDURE — S0028 INJECTION, FAMOTIDINE, 20 MG: HCPCS | Performed by: FAMILY MEDICINE

## 2018-06-13 PROCEDURE — 2580000003 HC RX 258: Performed by: SURGERY

## 2018-06-13 PROCEDURE — 2500000003 HC RX 250 WO HCPCS: Performed by: HOSPITALIST

## 2018-06-13 PROCEDURE — 94003 VENT MGMT INPAT SUBQ DAY: CPT

## 2018-06-13 PROCEDURE — 6360000002 HC RX W HCPCS: Performed by: HOSPITALIST

## 2018-06-13 PROCEDURE — 71045 X-RAY EXAM CHEST 1 VIEW: CPT

## 2018-06-13 PROCEDURE — 6360000002 HC RX W HCPCS: Performed by: INTERNAL MEDICINE

## 2018-06-13 PROCEDURE — 2500000003 HC RX 250 WO HCPCS: Performed by: CLINICAL NURSE SPECIALIST

## 2018-06-13 PROCEDURE — 2700000000 HC OXYGEN THERAPY PER DAY

## 2018-06-13 PROCEDURE — 85610 PROTHROMBIN TIME: CPT

## 2018-06-13 PROCEDURE — 99233 SBSQ HOSP IP/OBS HIGH 50: CPT | Performed by: HOSPITALIST

## 2018-06-13 PROCEDURE — 36600 WITHDRAWAL OF ARTERIAL BLOOD: CPT

## 2018-06-13 PROCEDURE — 2580000003 HC RX 258: Performed by: CLINICAL NURSE SPECIALIST

## 2018-06-13 PROCEDURE — 82948 REAGENT STRIP/BLOOD GLUCOSE: CPT

## 2018-06-13 PROCEDURE — 99232 SBSQ HOSP IP/OBS MODERATE 35: CPT | Performed by: INTERNAL MEDICINE

## 2018-06-13 PROCEDURE — 85025 COMPLETE CBC W/AUTO DIFF WBC: CPT

## 2018-06-13 RX ORDER — ALBUMIN (HUMAN) 12.5 G/50ML
12.5 SOLUTION INTRAVENOUS PRN
Status: DISCONTINUED | OUTPATIENT
Start: 2018-06-13 | End: 2018-06-20 | Stop reason: HOSPADM

## 2018-06-13 RX ORDER — HEPARIN SODIUM 1000 [USP'U]/ML
2000 INJECTION, SOLUTION INTRAVENOUS; SUBCUTANEOUS PRN
Status: DISCONTINUED | OUTPATIENT
Start: 2018-06-13 | End: 2018-06-20 | Stop reason: HOSPADM

## 2018-06-13 RX ORDER — HEPARIN SODIUM 1000 [USP'U]/ML
1800 INJECTION, SOLUTION INTRAVENOUS; SUBCUTANEOUS ONCE
Status: DISCONTINUED | OUTPATIENT
Start: 2018-06-13 | End: 2018-06-13

## 2018-06-13 RX ORDER — HEPARIN SODIUM (PORCINE) LOCK FLUSH IV SOLN 100 UNIT/ML 100 UNIT/ML
200 SOLUTION INTRAVENOUS PRN
Status: DISCONTINUED | OUTPATIENT
Start: 2018-06-13 | End: 2018-06-20 | Stop reason: HOSPADM

## 2018-06-13 RX ORDER — LORAZEPAM 2 MG/ML
2 INJECTION INTRAMUSCULAR
Status: DISCONTINUED | OUTPATIENT
Start: 2018-06-13 | End: 2018-06-14

## 2018-06-13 RX ORDER — FUROSEMIDE 10 MG/ML
20 INJECTION INTRAMUSCULAR; INTRAVENOUS ONCE
Status: COMPLETED | OUTPATIENT
Start: 2018-06-13 | End: 2018-06-13

## 2018-06-13 RX ADMIN — FAMOTIDINE 20 MG: 10 INJECTION, SOLUTION INTRAVENOUS at 07:36

## 2018-06-13 RX ADMIN — Medication 10 ML: at 20:48

## 2018-06-13 RX ADMIN — PROPOFOL 40 MCG/KG/MIN: 10 INJECTION, EMULSION INTRAVENOUS at 05:28

## 2018-06-13 RX ADMIN — ONDANSETRON 4 MG: 2 INJECTION INTRAMUSCULAR; INTRAVENOUS at 14:47

## 2018-06-13 RX ADMIN — HYDRALAZINE HYDROCHLORIDE 10 MG: 20 INJECTION INTRAMUSCULAR; INTRAVENOUS at 12:44

## 2018-06-13 RX ADMIN — CHLORHEXIDINE GLUCONATE 15 ML: 1.2 LIQUID BUCCAL at 07:36

## 2018-06-13 RX ADMIN — SODIUM CHLORIDE, PRESERVATIVE FREE 200 UNITS: 5 INJECTION INTRAVENOUS at 06:20

## 2018-06-13 RX ADMIN — METRONIDAZOLE 500 MG: 500 INJECTION, SOLUTION INTRAVENOUS at 15:56

## 2018-06-13 RX ADMIN — PROPOFOL 40 MCG/KG/MIN: 10 INJECTION, EMULSION INTRAVENOUS at 01:01

## 2018-06-13 RX ADMIN — Medication 2 MG: at 14:47

## 2018-06-13 RX ADMIN — SODIUM CHLORIDE: 9 INJECTION, SOLUTION INTRAVENOUS at 22:07

## 2018-06-13 RX ADMIN — SODIUM BICARBONATE: 84 INJECTION, SOLUTION INTRAVENOUS at 03:58

## 2018-06-13 RX ADMIN — Medication 2 MG: at 04:18

## 2018-06-13 RX ADMIN — Medication 10 ML: at 20:47

## 2018-06-13 RX ADMIN — ASPIRIN 81 MG: 81 TABLET, COATED ORAL at 07:36

## 2018-06-13 RX ADMIN — LORAZEPAM 2 MG: 2 INJECTION INTRAMUSCULAR; INTRAVENOUS at 23:33

## 2018-06-13 RX ADMIN — ACETAMINOPHEN 650 MG: 325 TABLET ORAL at 08:39

## 2018-06-13 RX ADMIN — HEPARIN SODIUM 2000 UNITS: 1000 INJECTION, SOLUTION INTRAVENOUS; SUBCUTANEOUS at 17:49

## 2018-06-13 RX ADMIN — FUROSEMIDE 20 MG: 10 INJECTION, SOLUTION INTRAMUSCULAR; INTRAVENOUS at 10:52

## 2018-06-13 RX ADMIN — Medication 10 ML: at 23:33

## 2018-06-13 RX ADMIN — Medication 2 MG: at 17:56

## 2018-06-13 RX ADMIN — HEPARIN SODIUM 2000 UNITS: 1000 INJECTION, SOLUTION INTRAVENOUS; SUBCUTANEOUS at 17:34

## 2018-06-13 RX ADMIN — Medication 2 MG: at 08:55

## 2018-06-13 RX ADMIN — Medication 10 ML: at 07:36

## 2018-06-13 RX ADMIN — METRONIDAZOLE 500 MG: 500 INJECTION, SOLUTION INTRAVENOUS at 23:33

## 2018-06-13 RX ADMIN — METRONIDAZOLE 500 MG: 500 INJECTION, SOLUTION INTRAVENOUS at 07:36

## 2018-06-13 RX ADMIN — CHLORHEXIDINE GLUCONATE 15 ML: 1.2 LIQUID BUCCAL at 20:46

## 2018-06-13 RX ADMIN — LORAZEPAM 2 MG: 2 INJECTION INTRAMUSCULAR; INTRAVENOUS at 18:11

## 2018-06-13 ASSESSMENT — PAIN SCALES - GENERAL
PAINLEVEL_OUTOF10: 7
PAINLEVEL_OUTOF10: 0
PAINLEVEL_OUTOF10: 7
PAINLEVEL_OUTOF10: 8
PAINLEVEL_OUTOF10: 7

## 2018-06-13 ASSESSMENT — PAIN SCALES - WONG BAKER
WONGBAKER_NUMERICALRESPONSE: 0
WONGBAKER_NUMERICALRESPONSE: 0

## 2018-06-13 ASSESSMENT — PULMONARY FUNCTION TESTS
PIF_VALUE: 22.8
PIF_VALUE: 22.5

## 2018-06-14 LAB
ANION GAP SERPL CALCULATED.3IONS-SCNC: 14 MMOL/L (ref 7–19)
BASE EXCESS ARTERIAL: 0.3 MMOL/L (ref -2–2)
BUN BLDV-MCNC: 16 MG/DL (ref 8–23)
CALCIUM SERPL-MCNC: 7 MG/DL (ref 8.8–10.2)
CARBOXYHEMOGLOBIN ARTERIAL: 2.3 % (ref 0–5)
CHLORIDE BLD-SCNC: 100 MMOL/L (ref 98–111)
CO2: 25 MMOL/L (ref 22–29)
CREAT SERPL-MCNC: 2.5 MG/DL (ref 0.5–1.2)
GFR NON-AFRICAN AMERICAN: 26
GLUCOSE BLD-MCNC: 104 MG/DL (ref 74–109)
GLUCOSE BLD-MCNC: 80 MG/DL (ref 70–99)
GLUCOSE BLD-MCNC: 80 MG/DL (ref 70–99)
GLUCOSE BLD-MCNC: 87 MG/DL (ref 70–99)
GLUCOSE BLD-MCNC: 94 MG/DL (ref 70–99)
HCO3 ARTERIAL: 24.6 MMOL/L (ref 22–26)
HCT VFR BLD CALC: 33.2 % (ref 42–52)
HEMOGLOBIN, ART, EXTENDED: 12.6 G/DL (ref 14–18)
HEMOGLOBIN: 11.1 G/DL (ref 14–18)
INR BLD: 1.27 (ref 0.88–1.18)
MCH RBC QN AUTO: 29.9 PG (ref 27–31)
MCHC RBC AUTO-ENTMCNC: 33.4 G/DL (ref 33–37)
MCV RBC AUTO: 89.5 FL (ref 80–94)
METHEMOGLOBIN ARTERIAL: 1.1 %
O2 CONTENT ARTERIAL: 16.4 ML/DL
O2 SAT, ARTERIAL: 92.4 %
O2 THERAPY: ABNORMAL
PCO2 ARTERIAL: 38 MMHG (ref 35–45)
PDW BLD-RTO: 15.9 % (ref 11.5–14.5)
PERFORMED ON: NORMAL
PH ARTERIAL: 7.42 (ref 7.35–7.45)
PLATELET # BLD: 63 K/UL (ref 130–400)
PMV BLD AUTO: 12.8 FL (ref 9.4–12.4)
PO2 ARTERIAL: 65 MMHG (ref 80–100)
POTASSIUM SERPL-SCNC: 4.2 MMOL/L (ref 3.5–5)
POTASSIUM, WHOLE BLOOD: 3.6
PROTHROMBIN TIME: 15.8 SEC (ref 12–14.6)
RBC # BLD: 3.71 M/UL (ref 4.7–6.1)
SODIUM BLD-SCNC: 139 MMOL/L (ref 136–145)
WBC # BLD: 15.6 K/UL (ref 4.8–10.8)

## 2018-06-14 PROCEDURE — 84132 ASSAY OF SERUM POTASSIUM: CPT

## 2018-06-14 PROCEDURE — 2580000003 HC RX 258: Performed by: INTERNAL MEDICINE

## 2018-06-14 PROCEDURE — 82948 REAGENT STRIP/BLOOD GLUCOSE: CPT

## 2018-06-14 PROCEDURE — 99232 SBSQ HOSP IP/OBS MODERATE 35: CPT | Performed by: INTERNAL MEDICINE

## 2018-06-14 PROCEDURE — 2580000003 HC RX 258: Performed by: SURGERY

## 2018-06-14 PROCEDURE — 2000000000 HC ICU R&B

## 2018-06-14 PROCEDURE — 2500000003 HC RX 250 WO HCPCS: Performed by: HOSPITALIST

## 2018-06-14 PROCEDURE — 85027 COMPLETE CBC AUTOMATED: CPT

## 2018-06-14 PROCEDURE — 99024 POSTOP FOLLOW-UP VISIT: CPT | Performed by: SURGERY

## 2018-06-14 PROCEDURE — 2700000000 HC OXYGEN THERAPY PER DAY

## 2018-06-14 PROCEDURE — 85610 PROTHROMBIN TIME: CPT

## 2018-06-14 PROCEDURE — 82803 BLOOD GASES ANY COMBINATION: CPT

## 2018-06-14 PROCEDURE — 99233 SBSQ HOSP IP/OBS HIGH 50: CPT | Performed by: HOSPITALIST

## 2018-06-14 PROCEDURE — 2580000003 HC RX 258: Performed by: HOSPITALIST

## 2018-06-14 PROCEDURE — 36592 COLLECT BLOOD FROM PICC: CPT

## 2018-06-14 PROCEDURE — 6360000002 HC RX W HCPCS: Performed by: SURGERY

## 2018-06-14 PROCEDURE — S0028 INJECTION, FAMOTIDINE, 20 MG: HCPCS | Performed by: FAMILY MEDICINE

## 2018-06-14 PROCEDURE — 2500000003 HC RX 250 WO HCPCS: Performed by: FAMILY MEDICINE

## 2018-06-14 PROCEDURE — 2500000003 HC RX 250 WO HCPCS: Performed by: INTERNAL MEDICINE

## 2018-06-14 PROCEDURE — 36600 WITHDRAWAL OF ARTERIAL BLOOD: CPT

## 2018-06-14 PROCEDURE — 80048 BASIC METABOLIC PNL TOTAL CA: CPT

## 2018-06-14 RX ORDER — LORAZEPAM 2 MG/ML
1 INJECTION INTRAMUSCULAR EVERY 4 HOURS PRN
Status: DISCONTINUED | OUTPATIENT
Start: 2018-06-14 | End: 2018-06-20 | Stop reason: HOSPADM

## 2018-06-14 RX ADMIN — SODIUM CHLORIDE: 9 INJECTION, SOLUTION INTRAVENOUS at 10:08

## 2018-06-14 RX ADMIN — FAMOTIDINE 20 MG: 10 INJECTION, SOLUTION INTRAVENOUS at 07:09

## 2018-06-14 RX ADMIN — METRONIDAZOLE 500 MG: 500 INJECTION, SOLUTION INTRAVENOUS at 07:08

## 2018-06-14 RX ADMIN — CHLORHEXIDINE GLUCONATE 15 ML: 1.2 LIQUID BUCCAL at 07:10

## 2018-06-14 RX ADMIN — Medication 10 ML: at 21:06

## 2018-06-14 RX ADMIN — Medication 2 MG: at 22:13

## 2018-06-14 RX ADMIN — Medication 10 ML: at 21:05

## 2018-06-14 RX ADMIN — Medication 10 ML: at 07:10

## 2018-06-14 RX ADMIN — CALCIUM CHLORIDE 0.33 G: 100 INJECTION INTRAVENOUS; INTRAVENTRICULAR at 04:12

## 2018-06-14 RX ADMIN — HYDRALAZINE HYDROCHLORIDE 10 MG: 20 INJECTION INTRAMUSCULAR; INTRAVENOUS at 04:00

## 2018-06-14 RX ADMIN — CHLORHEXIDINE GLUCONATE 15 ML: 1.2 LIQUID BUCCAL at 21:05

## 2018-06-14 RX ADMIN — SODIUM CHLORIDE: 9 INJECTION, SOLUTION INTRAVENOUS at 21:09

## 2018-06-14 RX ADMIN — METRONIDAZOLE 500 MG: 500 INJECTION, SOLUTION INTRAVENOUS at 16:31

## 2018-06-14 ASSESSMENT — PAIN SCALES - GENERAL
PAINLEVEL_OUTOF10: 0
PAINLEVEL_OUTOF10: 0
PAINLEVEL_OUTOF10: 5
PAINLEVEL_OUTOF10: 0

## 2018-06-14 ASSESSMENT — PAIN DESCRIPTION - DESCRIPTORS: DESCRIPTORS: PATIENT UNABLE TO DESCRIBE

## 2018-06-14 ASSESSMENT — PAIN SCALES - WONG BAKER
WONGBAKER_NUMERICALRESPONSE: 0

## 2018-06-14 ASSESSMENT — PAIN DESCRIPTION - LOCATION: LOCATION: ABDOMEN

## 2018-06-14 ASSESSMENT — PAIN DESCRIPTION - PAIN TYPE: TYPE: SURGICAL PAIN

## 2018-06-14 ASSESSMENT — PAIN DESCRIPTION - ORIENTATION: ORIENTATION: MID

## 2018-06-15 PROBLEM — E83.51 HYPOCALCEMIA: Status: ACTIVE | Noted: 2018-06-15

## 2018-06-15 LAB
ANION GAP SERPL CALCULATED.3IONS-SCNC: 20 MMOL/L (ref 7–19)
BLOOD BANK DISPENSE STATUS: NORMAL
BLOOD BANK PRODUCT CODE: NORMAL
BPU ID: NORMAL
BUN BLDV-MCNC: 37 MG/DL (ref 8–23)
CALCIUM IONIZED: 1.01 MMOL/L (ref 1.12–1.32)
CALCIUM SERPL-MCNC: 6.9 MG/DL (ref 8.8–10.2)
CHLORIDE BLD-SCNC: 101 MMOL/L (ref 98–111)
CO2: 19 MMOL/L (ref 22–29)
CREAT SERPL-MCNC: 4.5 MG/DL (ref 0.5–1.2)
DESCRIPTION BLOOD BANK: NORMAL
GFR NON-AFRICAN AMERICAN: 13
GLUCOSE BLD-MCNC: 106 MG/DL (ref 70–99)
GLUCOSE BLD-MCNC: 32 MG/DL (ref 70–99)
GLUCOSE BLD-MCNC: 76 MG/DL (ref 70–99)
GLUCOSE BLD-MCNC: 76 MG/DL (ref 74–109)
GLUCOSE BLD-MCNC: 84 MG/DL (ref 70–99)
GLUCOSE BLD-MCNC: 94 MG/DL (ref 70–99)
GLUCOSE BLD-MCNC: 98 MG/DL (ref 70–99)
HCT VFR BLD CALC: 30.3 % (ref 42–52)
HEMOGLOBIN: 9.7 G/DL (ref 14–18)
INR BLD: 1.1 (ref 0.88–1.18)
MCH RBC QN AUTO: 29.8 PG (ref 27–31)
MCHC RBC AUTO-ENTMCNC: 32 G/DL (ref 33–37)
MCV RBC AUTO: 92.9 FL (ref 80–94)
PDW BLD-RTO: 16.2 % (ref 11.5–14.5)
PERFORMED ON: ABNORMAL
PERFORMED ON: ABNORMAL
PERFORMED ON: NORMAL
PLATELET # BLD: 79 K/UL (ref 130–400)
PMV BLD AUTO: 12.4 FL (ref 9.4–12.4)
POTASSIUM SERPL-SCNC: 4.3 MMOL/L (ref 3.5–5)
PROTHROMBIN TIME: 14.1 SEC (ref 12–14.6)
RBC # BLD: 3.26 M/UL (ref 4.7–6.1)
SODIUM BLD-SCNC: 140 MMOL/L (ref 136–145)
WBC # BLD: 14.8 K/UL (ref 4.8–10.8)

## 2018-06-15 PROCEDURE — 2500000003 HC RX 250 WO HCPCS: Performed by: HOSPITALIST

## 2018-06-15 PROCEDURE — 2580000003 HC RX 258: Performed by: HOSPITALIST

## 2018-06-15 PROCEDURE — 6360000002 HC RX W HCPCS: Performed by: INTERNAL MEDICINE

## 2018-06-15 PROCEDURE — G8996 SWALLOW CURRENT STATUS: HCPCS

## 2018-06-15 PROCEDURE — 92610 EVALUATE SWALLOWING FUNCTION: CPT

## 2018-06-15 PROCEDURE — 2580000003 HC RX 258: Performed by: SURGERY

## 2018-06-15 PROCEDURE — 2580000003 HC RX 258: Performed by: FAMILY MEDICINE

## 2018-06-15 PROCEDURE — 2500000003 HC RX 250 WO HCPCS: Performed by: FAMILY MEDICINE

## 2018-06-15 PROCEDURE — 82948 REAGENT STRIP/BLOOD GLUCOSE: CPT

## 2018-06-15 PROCEDURE — 2000000000 HC ICU R&B

## 2018-06-15 PROCEDURE — 80048 BASIC METABOLIC PNL TOTAL CA: CPT

## 2018-06-15 PROCEDURE — 85610 PROTHROMBIN TIME: CPT

## 2018-06-15 PROCEDURE — 99024 POSTOP FOLLOW-UP VISIT: CPT | Performed by: SURGERY

## 2018-06-15 PROCEDURE — S0028 INJECTION, FAMOTIDINE, 20 MG: HCPCS | Performed by: FAMILY MEDICINE

## 2018-06-15 PROCEDURE — G8997 SWALLOW GOAL STATUS: HCPCS

## 2018-06-15 PROCEDURE — 82330 ASSAY OF CALCIUM: CPT

## 2018-06-15 PROCEDURE — 99233 SBSQ HOSP IP/OBS HIGH 50: CPT | Performed by: HOSPITALIST

## 2018-06-15 PROCEDURE — 2700000000 HC OXYGEN THERAPY PER DAY

## 2018-06-15 PROCEDURE — 36592 COLLECT BLOOD FROM PICC: CPT

## 2018-06-15 PROCEDURE — 6360000002 HC RX W HCPCS: Performed by: SURGERY

## 2018-06-15 PROCEDURE — 8010000000 HC HEMODIALYSIS ACUTE INPT

## 2018-06-15 PROCEDURE — 85027 COMPLETE CBC AUTOMATED: CPT

## 2018-06-15 PROCEDURE — 2580000003 HC RX 258: Performed by: INTERNAL MEDICINE

## 2018-06-15 RX ADMIN — Medication 10 ML: at 20:43

## 2018-06-15 RX ADMIN — Medication 2 MG: at 21:22

## 2018-06-15 RX ADMIN — METRONIDAZOLE 500 MG: 500 INJECTION, SOLUTION INTRAVENOUS at 15:54

## 2018-06-15 RX ADMIN — SODIUM CHLORIDE: 9 INJECTION, SOLUTION INTRAVENOUS at 20:30

## 2018-06-15 RX ADMIN — Medication 2 MG: at 17:57

## 2018-06-15 RX ADMIN — HEPARIN SODIUM 2000 UNITS: 1000 INJECTION, SOLUTION INTRAVENOUS; SUBCUTANEOUS at 11:55

## 2018-06-15 RX ADMIN — FAMOTIDINE 20 MG: 10 INJECTION, SOLUTION INTRAVENOUS at 07:24

## 2018-06-15 RX ADMIN — HEPARIN SODIUM 2000 UNITS: 1000 INJECTION, SOLUTION INTRAVENOUS; SUBCUTANEOUS at 12:00

## 2018-06-15 RX ADMIN — METRONIDAZOLE 500 MG: 500 INJECTION, SOLUTION INTRAVENOUS at 07:24

## 2018-06-15 RX ADMIN — CHLORHEXIDINE GLUCONATE 15 ML: 1.2 LIQUID BUCCAL at 20:44

## 2018-06-15 RX ADMIN — METRONIDAZOLE 500 MG: 500 INJECTION, SOLUTION INTRAVENOUS at 00:17

## 2018-06-15 RX ADMIN — Medication 10 ML: at 07:25

## 2018-06-15 RX ADMIN — DEXTROSE MONOHYDRATE 100 ML/HR: 50 INJECTION, SOLUTION INTRAVENOUS at 07:22

## 2018-06-15 RX ADMIN — DEXTROSE MONOHYDRATE 12.5 G: 25 INJECTION, SOLUTION INTRAVENOUS at 07:18

## 2018-06-15 RX ADMIN — METRONIDAZOLE 500 MG: 500 INJECTION, SOLUTION INTRAVENOUS at 23:36

## 2018-06-15 RX ADMIN — CALCIUM CHLORIDE 0.67 G: 100 INJECTION INTRAVENOUS; INTRAVENTRICULAR at 17:16

## 2018-06-15 ASSESSMENT — PAIN SCALES - GENERAL
PAINLEVEL_OUTOF10: 0
PAINLEVEL_OUTOF10: 7
PAINLEVEL_OUTOF10: 5
PAINLEVEL_OUTOF10: 0
PAINLEVEL_OUTOF10: 0

## 2018-06-15 ASSESSMENT — PAIN DESCRIPTION - PAIN TYPE
TYPE: ACUTE PAIN
TYPE: ACUTE PAIN

## 2018-06-15 ASSESSMENT — PAIN DESCRIPTION - ORIENTATION: ORIENTATION: LEFT

## 2018-06-15 ASSESSMENT — PAIN DESCRIPTION - LOCATION
LOCATION: FOOT
LOCATION: ABDOMEN

## 2018-06-15 ASSESSMENT — PAIN DESCRIPTION - DESCRIPTORS: DESCRIPTORS: ACHING

## 2018-06-16 LAB
ANION GAP SERPL CALCULATED.3IONS-SCNC: 13 MMOL/L (ref 7–19)
BUN BLDV-MCNC: 30 MG/DL (ref 8–23)
CALCIUM SERPL-MCNC: 7.6 MG/DL (ref 8.8–10.2)
CHLORIDE BLD-SCNC: 102 MMOL/L (ref 98–111)
CO2: 25 MMOL/L (ref 22–29)
CREAT SERPL-MCNC: 4.2 MG/DL (ref 0.5–1.2)
GFR NON-AFRICAN AMERICAN: 14
GLUCOSE BLD-MCNC: 108 MG/DL (ref 70–99)
GLUCOSE BLD-MCNC: 119 MG/DL (ref 70–99)
GLUCOSE BLD-MCNC: 127 MG/DL (ref 70–99)
GLUCOSE BLD-MCNC: 135 MG/DL (ref 74–109)
GLUCOSE BLD-MCNC: 97 MG/DL (ref 70–99)
HCT VFR BLD CALC: 27.5 % (ref 42–52)
HEMOGLOBIN: 8.9 G/DL (ref 14–18)
INR BLD: 1.12 (ref 0.88–1.18)
MCH RBC QN AUTO: 30.1 PG (ref 27–31)
MCHC RBC AUTO-ENTMCNC: 32.4 G/DL (ref 33–37)
MCV RBC AUTO: 92.9 FL (ref 80–94)
PDW BLD-RTO: 16 % (ref 11.5–14.5)
PERFORMED ON: ABNORMAL
PERFORMED ON: NORMAL
PLATELET # BLD: 90 K/UL (ref 130–400)
PMV BLD AUTO: 10.9 FL (ref 9.4–12.4)
POTASSIUM SERPL-SCNC: 4 MMOL/L (ref 3.5–5)
PROTHROMBIN TIME: 14.3 SEC (ref 12–14.6)
RBC # BLD: 2.96 M/UL (ref 4.7–6.1)
SODIUM BLD-SCNC: 140 MMOL/L (ref 136–145)
WBC # BLD: 14.2 K/UL (ref 4.8–10.8)

## 2018-06-16 PROCEDURE — 99024 POSTOP FOLLOW-UP VISIT: CPT | Performed by: NURSE PRACTITIONER

## 2018-06-16 PROCEDURE — 2580000003 HC RX 258: Performed by: INTERNAL MEDICINE

## 2018-06-16 PROCEDURE — 99233 SBSQ HOSP IP/OBS HIGH 50: CPT | Performed by: HOSPITALIST

## 2018-06-16 PROCEDURE — 82948 REAGENT STRIP/BLOOD GLUCOSE: CPT

## 2018-06-16 PROCEDURE — 6370000000 HC RX 637 (ALT 250 FOR IP): Performed by: SURGERY

## 2018-06-16 PROCEDURE — 80048 BASIC METABOLIC PNL TOTAL CA: CPT

## 2018-06-16 PROCEDURE — 2580000003 HC RX 258: Performed by: SURGERY

## 2018-06-16 PROCEDURE — 2500000003 HC RX 250 WO HCPCS: Performed by: HOSPITALIST

## 2018-06-16 PROCEDURE — 8010000000 HC HEMODIALYSIS ACUTE INPT

## 2018-06-16 PROCEDURE — 85610 PROTHROMBIN TIME: CPT

## 2018-06-16 PROCEDURE — 2000000000 HC ICU R&B

## 2018-06-16 PROCEDURE — 6360000002 HC RX W HCPCS: Performed by: SURGERY

## 2018-06-16 PROCEDURE — 85027 COMPLETE CBC AUTOMATED: CPT

## 2018-06-16 PROCEDURE — 2500000003 HC RX 250 WO HCPCS: Performed by: FAMILY MEDICINE

## 2018-06-16 PROCEDURE — 2580000003 HC RX 258: Performed by: FAMILY MEDICINE

## 2018-06-16 PROCEDURE — 2700000000 HC OXYGEN THERAPY PER DAY

## 2018-06-16 PROCEDURE — 36592 COLLECT BLOOD FROM PICC: CPT

## 2018-06-16 PROCEDURE — S0028 INJECTION, FAMOTIDINE, 20 MG: HCPCS | Performed by: FAMILY MEDICINE

## 2018-06-16 RX ADMIN — ACETAMINOPHEN 650 MG: 325 TABLET ORAL at 17:43

## 2018-06-16 RX ADMIN — ACETAMINOPHEN 650 MG: 325 TABLET ORAL at 21:10

## 2018-06-16 RX ADMIN — Medication 2 MG: at 02:14

## 2018-06-16 RX ADMIN — METRONIDAZOLE 500 MG: 500 INJECTION, SOLUTION INTRAVENOUS at 16:00

## 2018-06-16 RX ADMIN — Medication 10 ML: at 08:25

## 2018-06-16 RX ADMIN — Medication 10 ML: at 22:15

## 2018-06-16 RX ADMIN — Medication 2 MG: at 16:08

## 2018-06-16 RX ADMIN — Medication 10 ML: at 08:24

## 2018-06-16 RX ADMIN — METRONIDAZOLE 500 MG: 500 INJECTION, SOLUTION INTRAVENOUS at 08:24

## 2018-06-16 RX ADMIN — DEXTROSE MONOHYDRATE 100 ML/HR: 50 INJECTION, SOLUTION INTRAVENOUS at 00:36

## 2018-06-16 RX ADMIN — HYDROCODONE BITARTRATE AND ACETAMINOPHEN 1 TABLET: 5; 325 TABLET ORAL at 22:14

## 2018-06-16 RX ADMIN — Medication 2 MG: at 08:24

## 2018-06-16 RX ADMIN — FAMOTIDINE 20 MG: 10 INJECTION, SOLUTION INTRAVENOUS at 08:26

## 2018-06-16 RX ADMIN — SODIUM CHLORIDE: 9 INJECTION, SOLUTION INTRAVENOUS at 11:00

## 2018-06-16 ASSESSMENT — PAIN SCALES - GENERAL
PAINLEVEL_OUTOF10: 0
PAINLEVEL_OUTOF10: 4
PAINLEVEL_OUTOF10: 0
PAINLEVEL_OUTOF10: 0
PAINLEVEL_OUTOF10: 5

## 2018-06-16 ASSESSMENT — PAIN DESCRIPTION - ORIENTATION
ORIENTATION: LEFT
ORIENTATION: LEFT

## 2018-06-16 ASSESSMENT — PAIN DESCRIPTION - DESCRIPTORS
DESCRIPTORS: ACHING
DESCRIPTORS: ACHING

## 2018-06-16 ASSESSMENT — PAIN DESCRIPTION - LOCATION
LOCATION: FOOT
LOCATION: FOOT

## 2018-06-16 ASSESSMENT — PAIN DESCRIPTION - PAIN TYPE
TYPE: ACUTE PAIN
TYPE: ACUTE PAIN

## 2018-06-17 LAB
ANION GAP SERPL CALCULATED.3IONS-SCNC: 12 MMOL/L (ref 7–19)
BUN BLDV-MCNC: 30 MG/DL (ref 8–23)
CALCIUM SERPL-MCNC: 7.6 MG/DL (ref 8.8–10.2)
CHLORIDE BLD-SCNC: 100 MMOL/L (ref 98–111)
CO2: 26 MMOL/L (ref 22–29)
CREAT SERPL-MCNC: 3.8 MG/DL (ref 0.5–1.2)
GFR NON-AFRICAN AMERICAN: 16
GLUCOSE BLD-MCNC: 116 MG/DL (ref 70–99)
GLUCOSE BLD-MCNC: 123 MG/DL (ref 74–109)
GLUCOSE BLD-MCNC: 136 MG/DL (ref 70–99)
GLUCOSE BLD-MCNC: 148 MG/DL (ref 70–99)
GLUCOSE BLD-MCNC: 149 MG/DL (ref 70–99)
HCT VFR BLD CALC: 28.1 % (ref 42–52)
HEMOGLOBIN: 8.9 G/DL (ref 14–18)
INR BLD: 1.19 (ref 0.88–1.18)
MCH RBC QN AUTO: 29.4 PG (ref 27–31)
MCHC RBC AUTO-ENTMCNC: 31.7 G/DL (ref 33–37)
MCV RBC AUTO: 92.7 FL (ref 80–94)
PDW BLD-RTO: 15.6 % (ref 11.5–14.5)
PERFORMED ON: ABNORMAL
PLATELET # BLD: 127 K/UL (ref 130–400)
PMV BLD AUTO: 11.5 FL (ref 9.4–12.4)
POTASSIUM SERPL-SCNC: 3.7 MMOL/L (ref 3.5–5)
PROTHROMBIN TIME: 15 SEC (ref 12–14.6)
RBC # BLD: 3.03 M/UL (ref 4.7–6.1)
SODIUM BLD-SCNC: 138 MMOL/L (ref 136–145)
WBC # BLD: 11.4 K/UL (ref 4.8–10.8)

## 2018-06-17 PROCEDURE — 2580000003 HC RX 258: Performed by: SURGERY

## 2018-06-17 PROCEDURE — 2580000003 HC RX 258: Performed by: INTERNAL MEDICINE

## 2018-06-17 PROCEDURE — 82948 REAGENT STRIP/BLOOD GLUCOSE: CPT

## 2018-06-17 PROCEDURE — 2500000003 HC RX 250 WO HCPCS: Performed by: HOSPITALIST

## 2018-06-17 PROCEDURE — 1210000000 HC MED SURG R&B

## 2018-06-17 PROCEDURE — 6370000000 HC RX 637 (ALT 250 FOR IP): Performed by: SURGERY

## 2018-06-17 PROCEDURE — 2500000003 HC RX 250 WO HCPCS: Performed by: FAMILY MEDICINE

## 2018-06-17 PROCEDURE — 94640 AIRWAY INHALATION TREATMENT: CPT

## 2018-06-17 PROCEDURE — 2700000000 HC OXYGEN THERAPY PER DAY

## 2018-06-17 PROCEDURE — 99233 SBSQ HOSP IP/OBS HIGH 50: CPT | Performed by: HOSPITALIST

## 2018-06-17 PROCEDURE — 85027 COMPLETE CBC AUTOMATED: CPT

## 2018-06-17 PROCEDURE — 6370000000 HC RX 637 (ALT 250 FOR IP): Performed by: HOSPITALIST

## 2018-06-17 PROCEDURE — 85610 PROTHROMBIN TIME: CPT

## 2018-06-17 PROCEDURE — 6370000000 HC RX 637 (ALT 250 FOR IP): Performed by: NURSE PRACTITIONER

## 2018-06-17 PROCEDURE — 99024 POSTOP FOLLOW-UP VISIT: CPT | Performed by: NURSE PRACTITIONER

## 2018-06-17 PROCEDURE — S0028 INJECTION, FAMOTIDINE, 20 MG: HCPCS | Performed by: FAMILY MEDICINE

## 2018-06-17 PROCEDURE — 80048 BASIC METABOLIC PNL TOTAL CA: CPT

## 2018-06-17 RX ORDER — IPRATROPIUM BROMIDE AND ALBUTEROL SULFATE 2.5; .5 MG/3ML; MG/3ML
1 SOLUTION RESPIRATORY (INHALATION)
Status: DISCONTINUED | OUTPATIENT
Start: 2018-06-17 | End: 2018-06-20 | Stop reason: HOSPADM

## 2018-06-17 RX ORDER — METRONIDAZOLE 500 MG/1
500 TABLET ORAL EVERY 8 HOURS SCHEDULED
Status: DISCONTINUED | OUTPATIENT
Start: 2018-06-17 | End: 2018-06-18

## 2018-06-17 RX ADMIN — FAMOTIDINE 20 MG: 10 INJECTION, SOLUTION INTRAVENOUS at 07:45

## 2018-06-17 RX ADMIN — HYDROCODONE BITARTRATE AND ACETAMINOPHEN 1 TABLET: 5; 325 TABLET ORAL at 19:03

## 2018-06-17 RX ADMIN — HYDROCODONE BITARTRATE AND ACETAMINOPHEN 1 TABLET: 5; 325 TABLET ORAL at 02:54

## 2018-06-17 RX ADMIN — IPRATROPIUM BROMIDE AND ALBUTEROL SULFATE 1 AMPULE: .5; 3 SOLUTION RESPIRATORY (INHALATION) at 06:48

## 2018-06-17 RX ADMIN — CHLORHEXIDINE GLUCONATE 15 ML: 1.2 LIQUID BUCCAL at 07:45

## 2018-06-17 RX ADMIN — HYDROCODONE BITARTRATE AND ACETAMINOPHEN 1 TABLET: 5; 325 TABLET ORAL at 23:07

## 2018-06-17 RX ADMIN — ACETAMINOPHEN 650 MG: 325 TABLET ORAL at 05:16

## 2018-06-17 RX ADMIN — Medication 10 ML: at 07:45

## 2018-06-17 RX ADMIN — Medication 10 ML: at 23:12

## 2018-06-17 RX ADMIN — ASPIRIN 81 MG: 81 TABLET, COATED ORAL at 07:45

## 2018-06-17 RX ADMIN — HYDROCODONE BITARTRATE AND ACETAMINOPHEN 1 TABLET: 5; 325 TABLET ORAL at 23:33

## 2018-06-17 RX ADMIN — METRONIDAZOLE 500 MG: 500 TABLET ORAL at 13:03

## 2018-06-17 RX ADMIN — METRONIDAZOLE 500 MG: 500 TABLET ORAL at 23:06

## 2018-06-17 RX ADMIN — IPRATROPIUM BROMIDE AND ALBUTEROL SULFATE 1 AMPULE: .5; 3 SOLUTION RESPIRATORY (INHALATION) at 19:29

## 2018-06-17 RX ADMIN — HYDROCODONE BITARTRATE AND ACETAMINOPHEN 1 TABLET: 5; 325 TABLET ORAL at 11:23

## 2018-06-17 RX ADMIN — METRONIDAZOLE 500 MG: 500 INJECTION, SOLUTION INTRAVENOUS at 00:30

## 2018-06-17 RX ADMIN — METRONIDAZOLE 500 MG: 500 INJECTION, SOLUTION INTRAVENOUS at 07:45

## 2018-06-17 ASSESSMENT — PAIN SCALES - GENERAL
PAINLEVEL_OUTOF10: 7
PAINLEVEL_OUTOF10: 5
PAINLEVEL_OUTOF10: 0
PAINLEVEL_OUTOF10: 5
PAINLEVEL_OUTOF10: 0
PAINLEVEL_OUTOF10: 6
PAINLEVEL_OUTOF10: 0
PAINLEVEL_OUTOF10: 5

## 2018-06-18 LAB
ANION GAP SERPL CALCULATED.3IONS-SCNC: 15 MMOL/L (ref 7–19)
BUN BLDV-MCNC: 53 MG/DL (ref 8–23)
CALCIUM SERPL-MCNC: 7.4 MG/DL (ref 8.8–10.2)
CHLORIDE BLD-SCNC: 98 MMOL/L (ref 98–111)
CO2: 25 MMOL/L (ref 22–29)
CREAT SERPL-MCNC: 6.1 MG/DL (ref 0.5–1.2)
GFR NON-AFRICAN AMERICAN: 9
GLUCOSE BLD-MCNC: 107 MG/DL (ref 70–99)
GLUCOSE BLD-MCNC: 121 MG/DL (ref 70–99)
GLUCOSE BLD-MCNC: 134 MG/DL (ref 70–99)
GLUCOSE BLD-MCNC: 144 MG/DL (ref 74–109)
GLUCOSE BLD-MCNC: 145 MG/DL (ref 70–99)
HCT VFR BLD CALC: 28.7 % (ref 42–52)
HEMOGLOBIN: 9.2 G/DL (ref 14–18)
INR BLD: 1.18 (ref 0.88–1.18)
MCH RBC QN AUTO: 30.1 PG (ref 27–31)
MCHC RBC AUTO-ENTMCNC: 32.1 G/DL (ref 33–37)
MCV RBC AUTO: 93.8 FL (ref 80–94)
PDW BLD-RTO: 15.3 % (ref 11.5–14.5)
PERFORMED ON: ABNORMAL
PLATELET # BLD: 165 K/UL (ref 130–400)
PMV BLD AUTO: 11.2 FL (ref 9.4–12.4)
POTASSIUM SERPL-SCNC: 3.9 MMOL/L (ref 3.5–5)
PROTHROMBIN TIME: 14.9 SEC (ref 12–14.6)
RBC # BLD: 3.06 M/UL (ref 4.7–6.1)
SODIUM BLD-SCNC: 138 MMOL/L (ref 136–145)
WBC # BLD: 12.5 K/UL (ref 4.8–10.8)

## 2018-06-18 PROCEDURE — 83970 ASSAY OF PARATHORMONE: CPT

## 2018-06-18 PROCEDURE — 99232 SBSQ HOSP IP/OBS MODERATE 35: CPT | Performed by: HOSPITALIST

## 2018-06-18 PROCEDURE — 2700000000 HC OXYGEN THERAPY PER DAY

## 2018-06-18 PROCEDURE — 2580000003 HC RX 258: Performed by: INTERNAL MEDICINE

## 2018-06-18 PROCEDURE — 6370000000 HC RX 637 (ALT 250 FOR IP): Performed by: SURGERY

## 2018-06-18 PROCEDURE — 85610 PROTHROMBIN TIME: CPT

## 2018-06-18 PROCEDURE — 6370000000 HC RX 637 (ALT 250 FOR IP): Performed by: HOSPITALIST

## 2018-06-18 PROCEDURE — 80048 BASIC METABOLIC PNL TOTAL CA: CPT

## 2018-06-18 PROCEDURE — 2580000003 HC RX 258: Performed by: SURGERY

## 2018-06-18 PROCEDURE — G8988 SELF CARE GOAL STATUS: HCPCS

## 2018-06-18 PROCEDURE — 85027 COMPLETE CBC AUTOMATED: CPT

## 2018-06-18 PROCEDURE — 97161 PT EVAL LOW COMPLEX 20 MIN: CPT

## 2018-06-18 PROCEDURE — 97165 OT EVAL LOW COMPLEX 30 MIN: CPT

## 2018-06-18 PROCEDURE — G8978 MOBILITY CURRENT STATUS: HCPCS

## 2018-06-18 PROCEDURE — 6370000000 HC RX 637 (ALT 250 FOR IP): Performed by: NURSE PRACTITIONER

## 2018-06-18 PROCEDURE — 92526 ORAL FUNCTION THERAPY: CPT

## 2018-06-18 PROCEDURE — 6360000002 HC RX W HCPCS: Performed by: SURGERY

## 2018-06-18 PROCEDURE — G8979 MOBILITY GOAL STATUS: HCPCS

## 2018-06-18 PROCEDURE — 1210000000 HC MED SURG R&B

## 2018-06-18 PROCEDURE — 94640 AIRWAY INHALATION TREATMENT: CPT

## 2018-06-18 PROCEDURE — 6370000000 HC RX 637 (ALT 250 FOR IP): Performed by: FAMILY MEDICINE

## 2018-06-18 PROCEDURE — 36415 COLL VENOUS BLD VENIPUNCTURE: CPT

## 2018-06-18 PROCEDURE — 82948 REAGENT STRIP/BLOOD GLUCOSE: CPT

## 2018-06-18 PROCEDURE — G8987 SELF CARE CURRENT STATUS: HCPCS

## 2018-06-18 RX ORDER — FAMOTIDINE 20 MG/1
20 TABLET, FILM COATED ORAL DAILY
Status: DISCONTINUED | OUTPATIENT
Start: 2018-06-18 | End: 2018-06-20 | Stop reason: HOSPADM

## 2018-06-18 RX ADMIN — Medication 10 ML: at 09:10

## 2018-06-18 RX ADMIN — METRONIDAZOLE 500 MG: 500 TABLET ORAL at 06:56

## 2018-06-18 RX ADMIN — FAMOTIDINE 20 MG: 20 TABLET ORAL at 09:10

## 2018-06-18 RX ADMIN — ASPIRIN 81 MG: 81 TABLET, COATED ORAL at 09:10

## 2018-06-18 RX ADMIN — HYDROCODONE BITARTRATE AND ACETAMINOPHEN 2 TABLET: 5; 325 TABLET ORAL at 05:07

## 2018-06-18 RX ADMIN — SODIUM CHLORIDE: 9 INJECTION, SOLUTION INTRAVENOUS at 09:10

## 2018-06-18 RX ADMIN — ACETAMINOPHEN 650 MG: 325 TABLET ORAL at 19:51

## 2018-06-18 RX ADMIN — IPRATROPIUM BROMIDE AND ALBUTEROL SULFATE 1 AMPULE: .5; 3 SOLUTION RESPIRATORY (INHALATION) at 11:22

## 2018-06-18 RX ADMIN — Medication 10 ML: at 21:00

## 2018-06-18 RX ADMIN — IPRATROPIUM BROMIDE AND ALBUTEROL SULFATE 1 AMPULE: .5; 3 SOLUTION RESPIRATORY (INHALATION) at 20:30

## 2018-06-18 RX ADMIN — CHLORHEXIDINE GLUCONATE 15 ML: 1.2 LIQUID BUCCAL at 09:24

## 2018-06-18 RX ADMIN — HYDROCODONE BITARTRATE AND ACETAMINOPHEN 1 TABLET: 5; 325 TABLET ORAL at 19:51

## 2018-06-18 RX ADMIN — IPRATROPIUM BROMIDE AND ALBUTEROL SULFATE 1 AMPULE: .5; 3 SOLUTION RESPIRATORY (INHALATION) at 07:36

## 2018-06-18 RX ADMIN — Medication 2 MG: at 22:22

## 2018-06-18 ASSESSMENT — PAIN DESCRIPTION - FREQUENCY: FREQUENCY: CONTINUOUS

## 2018-06-18 ASSESSMENT — PAIN SCALES - WONG BAKER: WONGBAKER_NUMERICALRESPONSE: 6

## 2018-06-18 ASSESSMENT — PAIN DESCRIPTION - PAIN TYPE: TYPE: SURGICAL PAIN

## 2018-06-18 ASSESSMENT — PAIN SCALES - GENERAL
PAINLEVEL_OUTOF10: 7
PAINLEVEL_OUTOF10: 6
PAINLEVEL_OUTOF10: 7

## 2018-06-18 ASSESSMENT — PAIN DESCRIPTION - LOCATION: LOCATION: ABDOMEN

## 2018-06-19 ENCOUNTER — APPOINTMENT (OUTPATIENT)
Dept: GENERAL RADIOLOGY | Age: 73
DRG: 268 | End: 2018-06-19
Attending: SURGERY
Payer: MEDICARE

## 2018-06-19 LAB
ALBUMIN SERPL-MCNC: 2.1 G/DL (ref 3.5–5.2)
ALP BLD-CCNC: 88 U/L (ref 40–130)
ALT SERPL-CCNC: 15 U/L (ref 5–41)
ANION GAP SERPL CALCULATED.3IONS-SCNC: 17 MMOL/L (ref 7–19)
AST SERPL-CCNC: 64 U/L (ref 5–40)
BILIRUB SERPL-MCNC: 0.3 MG/DL (ref 0.2–1.2)
BUN BLDV-MCNC: 63 MG/DL (ref 8–23)
CALCIUM SERPL-MCNC: 7.5 MG/DL (ref 8.8–10.2)
CHLORIDE BLD-SCNC: 98 MMOL/L (ref 98–111)
CO2: 22 MMOL/L (ref 22–29)
CREAT SERPL-MCNC: 7 MG/DL (ref 0.5–1.2)
FERRITIN: 674 NG/ML (ref 30–400)
GFR NON-AFRICAN AMERICAN: 8
GLUCOSE BLD-MCNC: 102 MG/DL (ref 70–99)
GLUCOSE BLD-MCNC: 108 MG/DL (ref 74–109)
GLUCOSE BLD-MCNC: 119 MG/DL (ref 70–99)
GLUCOSE BLD-MCNC: 150 MG/DL (ref 70–99)
HCT VFR BLD CALC: 28.6 % (ref 42–52)
HEMOGLOBIN: 8.9 G/DL (ref 14–18)
INR BLD: 1.23 (ref 0.88–1.18)
IRON SATURATION: 15 % (ref 14–50)
IRON: 17 UG/DL (ref 59–158)
MCH RBC QN AUTO: 29.2 PG (ref 27–31)
MCHC RBC AUTO-ENTMCNC: 31.1 G/DL (ref 33–37)
MCV RBC AUTO: 93.8 FL (ref 80–94)
PARATHYROID HORMONE INTACT: 105.8 PG/ML (ref 15–65)
PDW BLD-RTO: 15.3 % (ref 11.5–14.5)
PERFORMED ON: ABNORMAL
PLATELET # BLD: 228 K/UL (ref 130–400)
PMV BLD AUTO: 11.1 FL (ref 9.4–12.4)
POTASSIUM SERPL-SCNC: 4 MMOL/L (ref 3.5–5)
PROTHROMBIN TIME: 15.4 SEC (ref 12–14.6)
RBC # BLD: 3.05 M/UL (ref 4.7–6.1)
SODIUM BLD-SCNC: 137 MMOL/L (ref 136–145)
TOTAL IRON BINDING CAPACITY: 113 UG/DL (ref 250–400)
TOTAL PROTEIN: 4.8 G/DL (ref 6.6–8.7)
WBC # BLD: 14.6 K/UL (ref 4.8–10.8)

## 2018-06-19 PROCEDURE — 2580000003 HC RX 258: Performed by: INTERNAL MEDICINE

## 2018-06-19 PROCEDURE — 6370000000 HC RX 637 (ALT 250 FOR IP): Performed by: SURGERY

## 2018-06-19 PROCEDURE — 2580000003 HC RX 258: Performed by: SURGERY

## 2018-06-19 PROCEDURE — 82948 REAGENT STRIP/BLOOD GLUCOSE: CPT

## 2018-06-19 PROCEDURE — 71046 X-RAY EXAM CHEST 2 VIEWS: CPT

## 2018-06-19 PROCEDURE — 99232 SBSQ HOSP IP/OBS MODERATE 35: CPT | Performed by: INTERNAL MEDICINE

## 2018-06-19 PROCEDURE — 83550 IRON BINDING TEST: CPT

## 2018-06-19 PROCEDURE — 2700000000 HC OXYGEN THERAPY PER DAY

## 2018-06-19 PROCEDURE — 94640 AIRWAY INHALATION TREATMENT: CPT

## 2018-06-19 PROCEDURE — 36415 COLL VENOUS BLD VENIPUNCTURE: CPT

## 2018-06-19 PROCEDURE — 83540 ASSAY OF IRON: CPT

## 2018-06-19 PROCEDURE — 6370000000 HC RX 637 (ALT 250 FOR IP): Performed by: NURSE PRACTITIONER

## 2018-06-19 PROCEDURE — 82728 ASSAY OF FERRITIN: CPT

## 2018-06-19 PROCEDURE — 97116 GAIT TRAINING THERAPY: CPT

## 2018-06-19 PROCEDURE — 85027 COMPLETE CBC AUTOMATED: CPT

## 2018-06-19 PROCEDURE — 1210000000 HC MED SURG R&B

## 2018-06-19 PROCEDURE — 6370000000 HC RX 637 (ALT 250 FOR IP): Performed by: FAMILY MEDICINE

## 2018-06-19 PROCEDURE — 97530 THERAPEUTIC ACTIVITIES: CPT

## 2018-06-19 PROCEDURE — 85610 PROTHROMBIN TIME: CPT

## 2018-06-19 PROCEDURE — 8010000000 HC HEMODIALYSIS ACUTE INPT

## 2018-06-19 PROCEDURE — 80053 COMPREHEN METABOLIC PANEL: CPT

## 2018-06-19 RX ADMIN — ASPIRIN 81 MG: 81 TABLET, COATED ORAL at 11:41

## 2018-06-19 RX ADMIN — HYDROCODONE BITARTRATE AND ACETAMINOPHEN 2 TABLET: 5; 325 TABLET ORAL at 12:16

## 2018-06-19 RX ADMIN — HYDROCODONE BITARTRATE AND ACETAMINOPHEN 2 TABLET: 5; 325 TABLET ORAL at 18:38

## 2018-06-19 RX ADMIN — IPRATROPIUM BROMIDE AND ALBUTEROL SULFATE 1 AMPULE: .5; 3 SOLUTION RESPIRATORY (INHALATION) at 16:11

## 2018-06-19 RX ADMIN — IPRATROPIUM BROMIDE AND ALBUTEROL SULFATE 1 AMPULE: .5; 3 SOLUTION RESPIRATORY (INHALATION) at 20:11

## 2018-06-19 RX ADMIN — IPRATROPIUM BROMIDE AND ALBUTEROL SULFATE 1 AMPULE: .5; 3 SOLUTION RESPIRATORY (INHALATION) at 07:48

## 2018-06-19 RX ADMIN — FAMOTIDINE 20 MG: 20 TABLET ORAL at 11:41

## 2018-06-19 RX ADMIN — Medication 10 ML: at 21:20

## 2018-06-19 RX ADMIN — Medication 10 ML: at 11:41

## 2018-06-19 RX ADMIN — CHLORHEXIDINE GLUCONATE 15 ML: 1.2 LIQUID BUCCAL at 21:18

## 2018-06-19 RX ADMIN — Medication 10 ML: at 21:22

## 2018-06-19 RX ADMIN — SODIUM CHLORIDE: 9 INJECTION, SOLUTION INTRAVENOUS at 05:17

## 2018-06-19 ASSESSMENT — PAIN DESCRIPTION - LOCATION: LOCATION: LEG

## 2018-06-19 ASSESSMENT — PAIN DESCRIPTION - PAIN TYPE: TYPE: ACUTE PAIN

## 2018-06-19 ASSESSMENT — PAIN DESCRIPTION - ORIENTATION: ORIENTATION: LEFT

## 2018-06-19 ASSESSMENT — PAIN DESCRIPTION - FREQUENCY: FREQUENCY: CONTINUOUS

## 2018-06-19 ASSESSMENT — PAIN SCALES - GENERAL
PAINLEVEL_OUTOF10: 6
PAINLEVEL_OUTOF10: 10
PAINLEVEL_OUTOF10: 6

## 2018-06-19 ASSESSMENT — PAIN DESCRIPTION - DESCRIPTORS: DESCRIPTORS: ACHING

## 2018-06-20 ENCOUNTER — HOSPITAL ENCOUNTER (INPATIENT)
Age: 73
LOS: 2 days | Discharge: ANOTHER HOSPITAL | DRG: 949 | End: 2018-06-22
Attending: PSYCHIATRY & NEUROLOGY | Admitting: PSYCHIATRY & NEUROLOGY
Payer: MEDICARE

## 2018-06-20 VITALS
SYSTOLIC BLOOD PRESSURE: 132 MMHG | HEIGHT: 73 IN | DIASTOLIC BLOOD PRESSURE: 69 MMHG | OXYGEN SATURATION: 95 % | BODY MASS INDEX: 24.4 KG/M2 | HEART RATE: 86 BPM | RESPIRATION RATE: 20 BRPM | TEMPERATURE: 97.2 F | WEIGHT: 184.13 LBS

## 2018-06-20 PROBLEM — Z98.890 S/P AAA REPAIR: Status: ACTIVE | Noted: 2018-06-20

## 2018-06-20 PROBLEM — Z86.79 S/P AAA REPAIR: Status: ACTIVE | Noted: 2018-06-20

## 2018-06-20 LAB
ALBUMIN SERPL-MCNC: 2.3 G/DL (ref 3.5–5.2)
ALP BLD-CCNC: 87 U/L (ref 40–130)
ALT SERPL-CCNC: 22 U/L (ref 5–41)
ANION GAP SERPL CALCULATED.3IONS-SCNC: 13 MMOL/L (ref 7–19)
AST SERPL-CCNC: 74 U/L (ref 5–40)
BILIRUB SERPL-MCNC: 0.3 MG/DL (ref 0.2–1.2)
BUN BLDV-MCNC: 44 MG/DL (ref 8–23)
CALCIUM SERPL-MCNC: 7.8 MG/DL (ref 8.8–10.2)
CHLORIDE BLD-SCNC: 98 MMOL/L (ref 98–111)
CO2: 24 MMOL/L (ref 22–29)
CREAT SERPL-MCNC: 6 MG/DL (ref 0.5–1.2)
GFR NON-AFRICAN AMERICAN: 9
GLUCOSE BLD-MCNC: 120 MG/DL (ref 70–99)
GLUCOSE BLD-MCNC: 128 MG/DL (ref 70–99)
GLUCOSE BLD-MCNC: 133 MG/DL (ref 70–99)
GLUCOSE BLD-MCNC: 134 MG/DL (ref 70–99)
GLUCOSE BLD-MCNC: 137 MG/DL (ref 74–109)
HCT VFR BLD CALC: 27.9 % (ref 42–52)
HEMOGLOBIN: 8.9 G/DL (ref 14–18)
INR BLD: 1.21 (ref 0.88–1.18)
MCH RBC QN AUTO: 30.4 PG (ref 27–31)
MCHC RBC AUTO-ENTMCNC: 31.9 G/DL (ref 33–37)
MCV RBC AUTO: 95.2 FL (ref 80–94)
PDW BLD-RTO: 15.2 % (ref 11.5–14.5)
PERFORMED ON: ABNORMAL
PLATELET # BLD: 271 K/UL (ref 130–400)
PMV BLD AUTO: 10.9 FL (ref 9.4–12.4)
POTASSIUM SERPL-SCNC: 4.1 MMOL/L (ref 3.5–5)
PROTHROMBIN TIME: 15.2 SEC (ref 12–14.6)
RBC # BLD: 2.93 M/UL (ref 4.7–6.1)
SODIUM BLD-SCNC: 135 MMOL/L (ref 136–145)
TOTAL PROTEIN: 4.8 G/DL (ref 6.6–8.7)
WBC # BLD: 17.7 K/UL (ref 4.8–10.8)

## 2018-06-20 PROCEDURE — 6370000000 HC RX 637 (ALT 250 FOR IP): Performed by: SURGERY

## 2018-06-20 PROCEDURE — 85610 PROTHROMBIN TIME: CPT

## 2018-06-20 PROCEDURE — 2580000003 HC RX 258: Performed by: SURGERY

## 2018-06-20 PROCEDURE — 1180000000 HC REHAB R&B

## 2018-06-20 PROCEDURE — 6370000000 HC RX 637 (ALT 250 FOR IP): Performed by: NURSE PRACTITIONER

## 2018-06-20 PROCEDURE — 6360000002 HC RX W HCPCS: Performed by: INTERNAL MEDICINE

## 2018-06-20 PROCEDURE — 97530 THERAPEUTIC ACTIVITIES: CPT

## 2018-06-20 PROCEDURE — 94640 AIRWAY INHALATION TREATMENT: CPT

## 2018-06-20 PROCEDURE — 82948 REAGENT STRIP/BLOOD GLUCOSE: CPT

## 2018-06-20 PROCEDURE — 99024 POSTOP FOLLOW-UP VISIT: CPT | Performed by: SURGERY

## 2018-06-20 PROCEDURE — 85027 COMPLETE CBC AUTOMATED: CPT

## 2018-06-20 PROCEDURE — 80053 COMPREHEN METABOLIC PANEL: CPT

## 2018-06-20 PROCEDURE — 97116 GAIT TRAINING THERAPY: CPT

## 2018-06-20 PROCEDURE — 36415 COLL VENOUS BLD VENIPUNCTURE: CPT

## 2018-06-20 PROCEDURE — 92526 ORAL FUNCTION THERAPY: CPT

## 2018-06-20 PROCEDURE — 6370000000 HC RX 637 (ALT 250 FOR IP): Performed by: FAMILY MEDICINE

## 2018-06-20 RX ORDER — MORPHINE SULFATE 1 MG/ML
2 INJECTION, SOLUTION EPIDURAL; INTRATHECAL; INTRAVENOUS
Status: CANCELLED | OUTPATIENT
Start: 2018-06-20

## 2018-06-20 RX ORDER — ALBUMIN (HUMAN) 12.5 G/50ML
12.5 SOLUTION INTRAVENOUS PRN
Status: CANCELLED | OUTPATIENT
Start: 2018-06-20

## 2018-06-20 RX ORDER — FAMOTIDINE 20 MG/1
20 TABLET, FILM COATED ORAL DAILY
Status: DISCONTINUED | OUTPATIENT
Start: 2018-06-21 | End: 2018-06-22 | Stop reason: HOSPADM

## 2018-06-20 RX ORDER — ACETAMINOPHEN 325 MG/1
650 TABLET ORAL EVERY 4 HOURS PRN
Status: DISCONTINUED | OUTPATIENT
Start: 2018-06-20 | End: 2018-06-20 | Stop reason: SDUPTHER

## 2018-06-20 RX ORDER — NICOTINE POLACRILEX 4 MG
15 LOZENGE BUCCAL PRN
Status: CANCELLED | OUTPATIENT
Start: 2018-06-20

## 2018-06-20 RX ORDER — NICOTINE POLACRILEX 4 MG
15 LOZENGE BUCCAL PRN
Status: DISCONTINUED | OUTPATIENT
Start: 2018-06-20 | End: 2018-06-22 | Stop reason: HOSPADM

## 2018-06-20 RX ORDER — LORAZEPAM 2 MG/ML
1 INJECTION INTRAMUSCULAR EVERY 4 HOURS PRN
Status: DISCONTINUED | OUTPATIENT
Start: 2018-06-20 | End: 2018-06-22 | Stop reason: HOSPADM

## 2018-06-20 RX ORDER — HEPARIN SODIUM 1000 [USP'U]/ML
1700 INJECTION, SOLUTION INTRAVENOUS; SUBCUTANEOUS ONCE
Status: DISCONTINUED | OUTPATIENT
Start: 2018-06-20 | End: 2018-06-20

## 2018-06-20 RX ORDER — BISACODYL 10 MG
10 SUPPOSITORY, RECTAL RECTAL DAILY PRN
Status: DISCONTINUED | OUTPATIENT
Start: 2018-06-20 | End: 2018-06-22 | Stop reason: HOSPADM

## 2018-06-20 RX ORDER — MORPHINE SULFATE 1 MG/ML
2 INJECTION, SOLUTION EPIDURAL; INTRATHECAL; INTRAVENOUS
Status: DISCONTINUED | OUTPATIENT
Start: 2018-06-20 | End: 2018-06-22 | Stop reason: HOSPADM

## 2018-06-20 RX ORDER — LORAZEPAM 2 MG/ML
1 INJECTION INTRAMUSCULAR EVERY 4 HOURS PRN
Status: CANCELLED | OUTPATIENT
Start: 2018-06-20

## 2018-06-20 RX ORDER — HYDRALAZINE HYDROCHLORIDE 20 MG/ML
10 INJECTION INTRAMUSCULAR; INTRAVENOUS EVERY 6 HOURS PRN
Status: DISCONTINUED | OUTPATIENT
Start: 2018-06-20 | End: 2018-06-22 | Stop reason: HOSPADM

## 2018-06-20 RX ORDER — HEPARIN SODIUM (PORCINE) LOCK FLUSH IV SOLN 100 UNIT/ML 100 UNIT/ML
200 SOLUTION INTRAVENOUS PRN
Status: DISCONTINUED | OUTPATIENT
Start: 2018-06-20 | End: 2018-06-22 | Stop reason: HOSPADM

## 2018-06-20 RX ORDER — HYDROCODONE BITARTRATE AND ACETAMINOPHEN 5; 325 MG/1; MG/1
2 TABLET ORAL EVERY 4 HOURS PRN
Status: DISCONTINUED | OUTPATIENT
Start: 2018-06-20 | End: 2018-06-22 | Stop reason: HOSPADM

## 2018-06-20 RX ORDER — DEXTROSE MONOHYDRATE 25 G/50ML
12.5 INJECTION, SOLUTION INTRAVENOUS PRN
Status: CANCELLED | OUTPATIENT
Start: 2018-06-20

## 2018-06-20 RX ORDER — SODIUM CHLORIDE 9 MG/ML
INJECTION, SOLUTION INTRAVENOUS CONTINUOUS
Status: CANCELLED | OUTPATIENT
Start: 2018-06-20

## 2018-06-20 RX ORDER — IPRATROPIUM BROMIDE AND ALBUTEROL SULFATE 2.5; .5 MG/3ML; MG/3ML
1 SOLUTION RESPIRATORY (INHALATION)
Status: CANCELLED | OUTPATIENT
Start: 2018-06-20

## 2018-06-20 RX ORDER — SODIUM CHLORIDE 0.9 % (FLUSH) 0.9 %
10 SYRINGE (ML) INJECTION PRN
Status: CANCELLED | OUTPATIENT
Start: 2018-06-20

## 2018-06-20 RX ORDER — CLONIDINE HYDROCHLORIDE 0.1 MG/1
0.1 TABLET ORAL
Status: DISCONTINUED | OUTPATIENT
Start: 2018-06-20 | End: 2018-06-22 | Stop reason: HOSPADM

## 2018-06-20 RX ORDER — ASPIRIN 81 MG/1
81 TABLET ORAL DAILY
Status: DISCONTINUED | OUTPATIENT
Start: 2018-06-21 | End: 2018-06-22 | Stop reason: HOSPADM

## 2018-06-20 RX ORDER — HYDRALAZINE HYDROCHLORIDE 20 MG/ML
10 INJECTION INTRAMUSCULAR; INTRAVENOUS EVERY 6 HOURS PRN
Status: CANCELLED | OUTPATIENT
Start: 2018-06-20

## 2018-06-20 RX ORDER — CHLORHEXIDINE GLUCONATE 0.12 MG/ML
15 RINSE ORAL 2 TIMES DAILY
Status: CANCELLED | OUTPATIENT
Start: 2018-06-20

## 2018-06-20 RX ORDER — SODIUM CHLORIDE 0.9 % (FLUSH) 0.9 %
10 SYRINGE (ML) INJECTION EVERY 12 HOURS SCHEDULED
Status: DISCONTINUED | OUTPATIENT
Start: 2018-06-20 | End: 2018-06-22 | Stop reason: HOSPADM

## 2018-06-20 RX ORDER — HEPARIN SODIUM (PORCINE) LOCK FLUSH IV SOLN 100 UNIT/ML 100 UNIT/ML
200 SOLUTION INTRAVENOUS PRN
Status: CANCELLED | OUTPATIENT
Start: 2018-06-20

## 2018-06-20 RX ORDER — ALBUMIN (HUMAN) 12.5 G/50ML
12.5 SOLUTION INTRAVENOUS PRN
Status: DISCONTINUED | OUTPATIENT
Start: 2018-06-20 | End: 2018-06-22 | Stop reason: HOSPADM

## 2018-06-20 RX ORDER — HYDROCODONE BITARTRATE AND ACETAMINOPHEN 5; 325 MG/1; MG/1
1 TABLET ORAL EVERY 4 HOURS PRN
Status: DISCONTINUED | OUTPATIENT
Start: 2018-06-20 | End: 2018-06-22 | Stop reason: HOSPADM

## 2018-06-20 RX ORDER — ASPIRIN 81 MG/1
81 TABLET ORAL DAILY
Status: CANCELLED | OUTPATIENT
Start: 2018-06-21

## 2018-06-20 RX ORDER — HEPARIN SODIUM 1000 [USP'U]/ML
2000 INJECTION, SOLUTION INTRAVENOUS; SUBCUTANEOUS PRN
Status: CANCELLED | OUTPATIENT
Start: 2018-06-20

## 2018-06-20 RX ORDER — ONDANSETRON 2 MG/ML
4 INJECTION INTRAMUSCULAR; INTRAVENOUS EVERY 6 HOURS PRN
Status: CANCELLED | OUTPATIENT
Start: 2018-06-20

## 2018-06-20 RX ORDER — ACETAMINOPHEN 325 MG/1
650 TABLET ORAL EVERY 4 HOURS PRN
Status: DISCONTINUED | OUTPATIENT
Start: 2018-06-20 | End: 2018-06-22 | Stop reason: HOSPADM

## 2018-06-20 RX ORDER — ACETAMINOPHEN 325 MG/1
650 TABLET ORAL EVERY 4 HOURS PRN
Status: CANCELLED | OUTPATIENT
Start: 2018-06-20

## 2018-06-20 RX ORDER — IPRATROPIUM BROMIDE AND ALBUTEROL SULFATE 2.5; .5 MG/3ML; MG/3ML
1 SOLUTION RESPIRATORY (INHALATION)
Status: DISCONTINUED | OUTPATIENT
Start: 2018-06-20 | End: 2018-06-22 | Stop reason: HOSPADM

## 2018-06-20 RX ORDER — CLONIDINE HYDROCHLORIDE 0.1 MG/1
0.1 TABLET ORAL
Status: CANCELLED | OUTPATIENT
Start: 2018-06-20

## 2018-06-20 RX ORDER — HYDROCODONE BITARTRATE AND ACETAMINOPHEN 5; 325 MG/1; MG/1
2 TABLET ORAL EVERY 4 HOURS PRN
Status: CANCELLED | OUTPATIENT
Start: 2018-06-20

## 2018-06-20 RX ORDER — DEXTROSE MONOHYDRATE 50 MG/ML
100 INJECTION, SOLUTION INTRAVENOUS PRN
Status: CANCELLED | OUTPATIENT
Start: 2018-06-20

## 2018-06-20 RX ORDER — DEXTROSE MONOHYDRATE 50 MG/ML
100 INJECTION, SOLUTION INTRAVENOUS PRN
Status: DISCONTINUED | OUTPATIENT
Start: 2018-06-20 | End: 2018-06-22 | Stop reason: HOSPADM

## 2018-06-20 RX ORDER — HEPARIN SODIUM 1000 [USP'U]/ML
2000 INJECTION, SOLUTION INTRAVENOUS; SUBCUTANEOUS PRN
Status: DISCONTINUED | OUTPATIENT
Start: 2018-06-20 | End: 2018-06-22 | Stop reason: HOSPADM

## 2018-06-20 RX ORDER — DOCUSATE SODIUM 100 MG/1
100 CAPSULE, LIQUID FILLED ORAL 2 TIMES DAILY PRN
Status: DISCONTINUED | OUTPATIENT
Start: 2018-06-20 | End: 2018-06-22 | Stop reason: HOSPADM

## 2018-06-20 RX ORDER — SODIUM CHLORIDE 0.9 % (FLUSH) 0.9 %
10 SYRINGE (ML) INJECTION EVERY 12 HOURS SCHEDULED
Status: CANCELLED | OUTPATIENT
Start: 2018-06-20

## 2018-06-20 RX ORDER — SODIUM CHLORIDE 0.9 % (FLUSH) 0.9 %
10 SYRINGE (ML) INJECTION PRN
Status: DISCONTINUED | OUTPATIENT
Start: 2018-06-20 | End: 2018-06-22 | Stop reason: HOSPADM

## 2018-06-20 RX ORDER — HYDROCODONE BITARTRATE AND ACETAMINOPHEN 5; 325 MG/1; MG/1
1 TABLET ORAL EVERY 4 HOURS PRN
Status: CANCELLED | OUTPATIENT
Start: 2018-06-20

## 2018-06-20 RX ORDER — SODIUM CHLORIDE 9 MG/ML
INJECTION, SOLUTION INTRAVENOUS CONTINUOUS
Status: DISCONTINUED | OUTPATIENT
Start: 2018-06-20 | End: 2018-06-21

## 2018-06-20 RX ORDER — 0.9 % SODIUM CHLORIDE 0.9 %
10 VIAL (ML) INJECTION EVERY 12 HOURS SCHEDULED
Status: CANCELLED | OUTPATIENT
Start: 2018-06-20

## 2018-06-20 RX ORDER — CHLORHEXIDINE GLUCONATE 0.12 MG/ML
15 RINSE ORAL 2 TIMES DAILY
Status: DISCONTINUED | OUTPATIENT
Start: 2018-06-20 | End: 2018-06-22 | Stop reason: HOSPADM

## 2018-06-20 RX ORDER — FAMOTIDINE 20 MG/1
20 TABLET, FILM COATED ORAL DAILY
Status: CANCELLED | OUTPATIENT
Start: 2018-06-21

## 2018-06-20 RX ORDER — 0.9 % SODIUM CHLORIDE 0.9 %
10 VIAL (ML) INJECTION PRN
Status: CANCELLED | OUTPATIENT
Start: 2018-06-20

## 2018-06-20 RX ORDER — ONDANSETRON 2 MG/ML
4 INJECTION INTRAMUSCULAR; INTRAVENOUS EVERY 6 HOURS PRN
Status: DISCONTINUED | OUTPATIENT
Start: 2018-06-20 | End: 2018-06-22 | Stop reason: HOSPADM

## 2018-06-20 RX ORDER — DEXTROSE MONOHYDRATE 25 G/50ML
12.5 INJECTION, SOLUTION INTRAVENOUS PRN
Status: DISCONTINUED | OUTPATIENT
Start: 2018-06-20 | End: 2018-06-22 | Stop reason: HOSPADM

## 2018-06-20 RX ADMIN — HYDROCODONE BITARTRATE AND ACETAMINOPHEN 2 TABLET: 5; 325 TABLET ORAL at 14:05

## 2018-06-20 RX ADMIN — CHLORHEXIDINE GLUCONATE 15 ML: 1.2 LIQUID BUCCAL at 08:26

## 2018-06-20 RX ADMIN — IRON SUCROSE 200 MG: 20 INJECTION, SOLUTION INTRAVENOUS at 12:17

## 2018-06-20 RX ADMIN — SODIUM CHLORIDE: 9 INJECTION, SOLUTION INTRAVENOUS at 21:16

## 2018-06-20 RX ADMIN — ASPIRIN 81 MG: 81 TABLET, COATED ORAL at 08:20

## 2018-06-20 RX ADMIN — FAMOTIDINE 20 MG: 20 TABLET ORAL at 08:21

## 2018-06-20 RX ADMIN — CLONIDINE HYDROCHLORIDE 0.1 MG: 0.1 TABLET ORAL at 04:52

## 2018-06-20 RX ADMIN — IPRATROPIUM BROMIDE AND ALBUTEROL SULFATE 1 AMPULE: .5; 3 SOLUTION RESPIRATORY (INHALATION) at 11:21

## 2018-06-20 RX ADMIN — IPRATROPIUM BROMIDE AND ALBUTEROL SULFATE 1 AMPULE: .5; 3 SOLUTION RESPIRATORY (INHALATION) at 20:05

## 2018-06-20 RX ADMIN — IPRATROPIUM BROMIDE AND ALBUTEROL SULFATE 1 AMPULE: .5; 3 SOLUTION RESPIRATORY (INHALATION) at 07:45

## 2018-06-20 ASSESSMENT — PAIN DESCRIPTION - PAIN TYPE
TYPE: ACUTE PAIN

## 2018-06-20 ASSESSMENT — PAIN DESCRIPTION - DESCRIPTORS
DESCRIPTORS: ACHING
DESCRIPTORS: ACHING

## 2018-06-20 ASSESSMENT — PAIN DESCRIPTION - ORIENTATION
ORIENTATION: LEFT

## 2018-06-20 ASSESSMENT — PAIN SCALES - GENERAL
PAINLEVEL_OUTOF10: 9
PAINLEVEL_OUTOF10: 9
PAINLEVEL_OUTOF10: 6
PAINLEVEL_OUTOF10: 7

## 2018-06-20 ASSESSMENT — PAIN DESCRIPTION - LOCATION
LOCATION: LEG

## 2018-06-20 ASSESSMENT — PAIN DESCRIPTION - FREQUENCY
FREQUENCY: CONTINUOUS
FREQUENCY: CONTINUOUS

## 2018-06-21 PROBLEM — M79.605 PAIN IN LEFT LEG: Status: ACTIVE | Noted: 2018-06-21

## 2018-06-21 PROBLEM — R53.1 WEAKNESS: Status: ACTIVE | Noted: 2018-06-21

## 2018-06-21 PROBLEM — R26.9 GAIT ABNORMALITY: Status: ACTIVE | Noted: 2018-06-21

## 2018-06-21 LAB
ALBUMIN SERPL-MCNC: 2.2 G/DL (ref 3.5–5.2)
ALP BLD-CCNC: 78 U/L (ref 40–130)
ALT SERPL-CCNC: 20 U/L (ref 5–41)
ANION GAP SERPL CALCULATED.3IONS-SCNC: 15 MMOL/L (ref 7–19)
AST SERPL-CCNC: 56 U/L (ref 5–40)
BASOPHILS ABSOLUTE: 0 K/UL (ref 0–0.2)
BASOPHILS RELATIVE PERCENT: 0.2 % (ref 0–1)
BILIRUB SERPL-MCNC: 0.3 MG/DL (ref 0.2–1.2)
BUN BLDV-MCNC: 56 MG/DL (ref 8–23)
CALCIUM SERPL-MCNC: 7.3 MG/DL (ref 8.8–10.2)
CHLORIDE BLD-SCNC: 94 MMOL/L (ref 98–111)
CO2: 23 MMOL/L (ref 22–29)
CREAT SERPL-MCNC: 7.5 MG/DL (ref 0.5–1.2)
EOSINOPHILS ABSOLUTE: 0.2 K/UL (ref 0–0.6)
EOSINOPHILS RELATIVE PERCENT: 1.4 % (ref 0–5)
GFR NON-AFRICAN AMERICAN: 7
GLUCOSE BLD-MCNC: 120 MG/DL (ref 70–99)
GLUCOSE BLD-MCNC: 122 MG/DL (ref 70–99)
GLUCOSE BLD-MCNC: 123 MG/DL (ref 74–109)
GLUCOSE BLD-MCNC: 124 MG/DL (ref 70–99)
GLUCOSE BLD-MCNC: 137 MG/DL (ref 70–99)
HCT VFR BLD CALC: 24.6 % (ref 42–52)
HEMOGLOBIN: 8 G/DL (ref 14–18)
INR BLD: 1.16 (ref 0.88–1.18)
LYMPHOCYTES ABSOLUTE: 1.3 K/UL (ref 1.1–4.5)
LYMPHOCYTES RELATIVE PERCENT: 7.8 % (ref 20–40)
MCH RBC QN AUTO: 30.4 PG (ref 27–31)
MCHC RBC AUTO-ENTMCNC: 32.5 G/DL (ref 33–37)
MCV RBC AUTO: 93.5 FL (ref 80–94)
MONOCYTES ABSOLUTE: 1.4 K/UL (ref 0–0.9)
MONOCYTES RELATIVE PERCENT: 8.1 % (ref 0–10)
NEUTROPHILS ABSOLUTE: 13.5 K/UL (ref 1.5–7.5)
NEUTROPHILS RELATIVE PERCENT: 79.6 % (ref 50–65)
PDW BLD-RTO: 15.2 % (ref 11.5–14.5)
PERFORMED ON: ABNORMAL
PLATELET # BLD: 313 K/UL (ref 130–400)
PMV BLD AUTO: 10.7 FL (ref 9.4–12.4)
POTASSIUM REFLEX MAGNESIUM: 4.2 MMOL/L (ref 3.5–5)
PREALBUMIN: 11 MG/DL (ref 20–40)
PROTHROMBIN TIME: 14.7 SEC (ref 12–14.6)
RBC # BLD: 2.63 M/UL (ref 4.7–6.1)
SODIUM BLD-SCNC: 132 MMOL/L (ref 136–145)
T4 FREE: 1 NG/DL (ref 0.9–1.7)
TOTAL PROTEIN: 4 G/DL (ref 6.6–8.7)
TSH SERPL DL<=0.05 MIU/L-ACNC: 4.62 UIU/ML (ref 0.27–4.2)
VITAMIN B-12: 953 PG/ML (ref 211–946)
WBC # BLD: 17 K/UL (ref 4.8–10.8)

## 2018-06-21 PROCEDURE — 5A1D70Z PERFORMANCE OF URINARY FILTRATION, INTERMITTENT, LESS THAN 6 HOURS PER DAY: ICD-10-PCS | Performed by: SURGERY

## 2018-06-21 PROCEDURE — 6370000000 HC RX 637 (ALT 250 FOR IP): Performed by: SURGERY

## 2018-06-21 PROCEDURE — 92610 EVALUATE SWALLOWING FUNCTION: CPT

## 2018-06-21 PROCEDURE — 80053 COMPREHEN METABOLIC PANEL: CPT

## 2018-06-21 PROCEDURE — 96125 COGNITIVE TEST BY HC PRO: CPT

## 2018-06-21 PROCEDURE — 97110 THERAPEUTIC EXERCISES: CPT

## 2018-06-21 PROCEDURE — 6360000002 HC RX W HCPCS: Performed by: INTERNAL MEDICINE

## 2018-06-21 PROCEDURE — 97162 PT EVAL MOD COMPLEX 30 MIN: CPT

## 2018-06-21 PROCEDURE — 84134 ASSAY OF PREALBUMIN: CPT

## 2018-06-21 PROCEDURE — 8010000000 HC HEMODIALYSIS ACUTE INPT

## 2018-06-21 PROCEDURE — 97116 GAIT TRAINING THERAPY: CPT

## 2018-06-21 PROCEDURE — 1180000000 HC REHAB R&B

## 2018-06-21 PROCEDURE — 85025 COMPLETE CBC W/AUTO DIFF WBC: CPT

## 2018-06-21 PROCEDURE — 84443 ASSAY THYROID STIM HORMONE: CPT

## 2018-06-21 PROCEDURE — 2580000003 HC RX 258: Performed by: SURGERY

## 2018-06-21 PROCEDURE — 2580000003 HC RX 258: Performed by: PHYSICIAN ASSISTANT

## 2018-06-21 PROCEDURE — 97165 OT EVAL LOW COMPLEX 30 MIN: CPT

## 2018-06-21 PROCEDURE — 92526 ORAL FUNCTION THERAPY: CPT

## 2018-06-21 PROCEDURE — 94640 AIRWAY INHALATION TREATMENT: CPT

## 2018-06-21 PROCEDURE — 97535 SELF CARE MNGMENT TRAINING: CPT

## 2018-06-21 PROCEDURE — 84439 ASSAY OF FREE THYROXINE: CPT

## 2018-06-21 PROCEDURE — 99221 1ST HOSP IP/OBS SF/LOW 40: CPT | Performed by: PHYSICIAN ASSISTANT

## 2018-06-21 PROCEDURE — 36415 COLL VENOUS BLD VENIPUNCTURE: CPT

## 2018-06-21 PROCEDURE — 82607 VITAMIN B-12: CPT

## 2018-06-21 PROCEDURE — 99223 1ST HOSP IP/OBS HIGH 75: CPT | Performed by: PSYCHIATRY & NEUROLOGY

## 2018-06-21 PROCEDURE — 82948 REAGENT STRIP/BLOOD GLUCOSE: CPT

## 2018-06-21 PROCEDURE — 6360000002 HC RX W HCPCS: Performed by: PHYSICIAN ASSISTANT

## 2018-06-21 PROCEDURE — 85610 PROTHROMBIN TIME: CPT

## 2018-06-21 PROCEDURE — 6360000002 HC RX W HCPCS: Performed by: SURGERY

## 2018-06-21 RX ADMIN — IPRATROPIUM BROMIDE AND ALBUTEROL SULFATE 1 AMPULE: .5; 3 SOLUTION RESPIRATORY (INHALATION) at 20:15

## 2018-06-21 RX ADMIN — IPRATROPIUM BROMIDE AND ALBUTEROL SULFATE 1 AMPULE: .5; 3 SOLUTION RESPIRATORY (INHALATION) at 06:06

## 2018-06-21 RX ADMIN — FAMOTIDINE 20 MG: 20 TABLET ORAL at 11:08

## 2018-06-21 RX ADMIN — ALTEPLASE 2 MG: 2.2 INJECTION, POWDER, LYOPHILIZED, FOR SOLUTION INTRAVENOUS at 10:00

## 2018-06-21 RX ADMIN — IPRATROPIUM BROMIDE AND ALBUTEROL SULFATE 1 AMPULE: .5; 3 SOLUTION RESPIRATORY (INHALATION) at 13:46

## 2018-06-21 RX ADMIN — Medication 10 ML: at 20:20

## 2018-06-21 RX ADMIN — HYDROCODONE BITARTRATE AND ACETAMINOPHEN 2 TABLET: 5; 325 TABLET ORAL at 22:16

## 2018-06-21 RX ADMIN — SODIUM CHLORIDE 1.5 G: 900 INJECTION INTRAVENOUS at 17:42

## 2018-06-21 RX ADMIN — ASPIRIN 81 MG: 81 TABLET, COATED ORAL at 11:08

## 2018-06-21 RX ADMIN — IRON SUCROSE 200 MG: 20 INJECTION, SOLUTION INTRAVENOUS at 11:08

## 2018-06-21 RX ADMIN — IPRATROPIUM BROMIDE AND ALBUTEROL SULFATE 1 AMPULE: .5; 3 SOLUTION RESPIRATORY (INHALATION) at 10:02

## 2018-06-21 RX ADMIN — Medication 10 ML: at 11:22

## 2018-06-21 ASSESSMENT — PAIN DESCRIPTION - LOCATION: LOCATION: FOOT

## 2018-06-21 ASSESSMENT — PAIN SCALES - GENERAL
PAINLEVEL_OUTOF10: 6
PAINLEVEL_OUTOF10: 7
PAINLEVEL_OUTOF10: 3

## 2018-06-21 ASSESSMENT — PAIN DESCRIPTION - ORIENTATION: ORIENTATION: LEFT

## 2018-06-22 ENCOUNTER — APPOINTMENT (OUTPATIENT)
Dept: GENERAL RADIOLOGY | Age: 73
DRG: 308 | End: 2018-06-22
Attending: INTERNAL MEDICINE
Payer: MEDICARE

## 2018-06-22 ENCOUNTER — HOSPITAL ENCOUNTER (INPATIENT)
Age: 73
LOS: 7 days | Discharge: NURSING, REHAB OR LONG-TERM CARE | DRG: 308 | End: 2018-06-29
Attending: INTERNAL MEDICINE | Admitting: INTERNAL MEDICINE
Payer: MEDICARE

## 2018-06-22 VITALS
RESPIRATION RATE: 18 BRPM | OXYGEN SATURATION: 91 % | HEART RATE: 83 BPM | TEMPERATURE: 98.4 F | HEIGHT: 73 IN | SYSTOLIC BLOOD PRESSURE: 155 MMHG | BODY MASS INDEX: 24.85 KG/M2 | DIASTOLIC BLOOD PRESSURE: 67 MMHG | WEIGHT: 187.5 LBS

## 2018-06-22 DIAGNOSIS — Z86.79 S/P AAA REPAIR: Primary | ICD-10-CM

## 2018-06-22 DIAGNOSIS — Z98.890 S/P AAA REPAIR: Primary | ICD-10-CM

## 2018-06-22 PROBLEM — I47.1 SVT (SUPRAVENTRICULAR TACHYCARDIA) (HCC): Status: ACTIVE | Noted: 2018-06-22

## 2018-06-22 PROBLEM — Z99.2 ESRD (END STAGE RENAL DISEASE) ON DIALYSIS (HCC): Status: ACTIVE | Noted: 2018-06-22

## 2018-06-22 PROBLEM — N17.9 AKI (ACUTE KIDNEY INJURY) (HCC): Status: ACTIVE | Noted: 2018-06-22

## 2018-06-22 PROBLEM — N18.6 ESRD (END STAGE RENAL DISEASE) ON DIALYSIS (HCC): Status: ACTIVE | Noted: 2018-06-22

## 2018-06-22 PROBLEM — I47.10 SVT (SUPRAVENTRICULAR TACHYCARDIA): Status: ACTIVE | Noted: 2018-06-22

## 2018-06-22 PROBLEM — N17.0 ATN (ACUTE TUBULAR NECROSIS) (HCC): Status: ACTIVE | Noted: 2018-06-22

## 2018-06-22 PROBLEM — D72.829 LEUKOCYTOSIS: Status: ACTIVE | Noted: 2018-06-22

## 2018-06-22 LAB
ALBUMIN SERPL-MCNC: 2.5 G/DL (ref 3.5–5.2)
ALP BLD-CCNC: 83 U/L (ref 40–130)
ALT SERPL-CCNC: 18 U/L (ref 5–41)
ANION GAP SERPL CALCULATED.3IONS-SCNC: 17 MMOL/L (ref 7–19)
AST SERPL-CCNC: 42 U/L (ref 5–40)
BILIRUB SERPL-MCNC: 0.3 MG/DL (ref 0.2–1.2)
BUN BLDV-MCNC: 50 MG/DL (ref 8–23)
C DIFFICILE TOXIN, EIA: NORMAL
CALCIUM SERPL-MCNC: 7.9 MG/DL (ref 8.8–10.2)
CHLORIDE BLD-SCNC: 94 MMOL/L (ref 98–111)
CO2: 23 MMOL/L (ref 22–29)
CREAT SERPL-MCNC: 7 MG/DL (ref 0.5–1.2)
GFR NON-AFRICAN AMERICAN: 8
GLUCOSE BLD-MCNC: 111 MG/DL (ref 74–109)
GLUCOSE BLD-MCNC: 118 MG/DL (ref 70–99)
GLUCOSE BLD-MCNC: 123 MG/DL (ref 70–99)
GLUCOSE BLD-MCNC: 130 MG/DL (ref 70–99)
GLUCOSE BLD-MCNC: 132 MG/DL (ref 70–99)
HCT VFR BLD CALC: 25.7 % (ref 42–52)
HEMOGLOBIN: 8.2 G/DL (ref 14–18)
INR BLD: 1.17 (ref 0.88–1.18)
MAGNESIUM: 2.4 MG/DL (ref 1.6–2.4)
MCH RBC QN AUTO: 29.7 PG (ref 27–31)
MCHC RBC AUTO-ENTMCNC: 31.9 G/DL (ref 33–37)
MCV RBC AUTO: 93.1 FL (ref 80–94)
PDW BLD-RTO: 15.1 % (ref 11.5–14.5)
PERFORMED ON: ABNORMAL
PLATELET # BLD: 426 K/UL (ref 130–400)
PMV BLD AUTO: 10.6 FL (ref 9.4–12.4)
POTASSIUM SERPL-SCNC: 4 MMOL/L (ref 3.5–5)
PROTHROMBIN TIME: 14.8 SEC (ref 12–14.6)
RBC # BLD: 2.76 M/UL (ref 4.7–6.1)
SODIUM BLD-SCNC: 134 MMOL/L (ref 136–145)
T4 FREE: 1.1 NG/DL (ref 0.9–1.7)
TOTAL PROTEIN: 5.2 G/DL (ref 6.6–8.7)
TSH SERPL DL<=0.05 MIU/L-ACNC: 5.08 UIU/ML (ref 0.27–4.2)
WBC # BLD: 19.6 K/UL (ref 4.8–10.8)

## 2018-06-22 PROCEDURE — 87040 BLOOD CULTURE FOR BACTERIA: CPT

## 2018-06-22 PROCEDURE — 99239 HOSP IP/OBS DSCHRG MGMT >30: CPT | Performed by: PSYCHIATRY & NEUROLOGY

## 2018-06-22 PROCEDURE — 2500000003 HC RX 250 WO HCPCS: Performed by: INTERNAL MEDICINE

## 2018-06-22 PROCEDURE — 2500000003 HC RX 250 WO HCPCS: Performed by: NURSE PRACTITIONER

## 2018-06-22 PROCEDURE — 6360000002 HC RX W HCPCS: Performed by: INTERNAL MEDICINE

## 2018-06-22 PROCEDURE — 93005 ELECTROCARDIOGRAM TRACING: CPT

## 2018-06-22 PROCEDURE — 99291 CRITICAL CARE FIRST HOUR: CPT | Performed by: INTERNAL MEDICINE

## 2018-06-22 PROCEDURE — APPSS60 APP SPLIT SHARED TIME 46-60 MINUTES: Performed by: PHYSICIAN ASSISTANT

## 2018-06-22 PROCEDURE — 94640 AIRWAY INHALATION TREATMENT: CPT

## 2018-06-22 PROCEDURE — 2580000003 HC RX 258: Performed by: INTERNAL MEDICINE

## 2018-06-22 PROCEDURE — 87324 CLOSTRIDIUM AG IA: CPT

## 2018-06-22 PROCEDURE — 80053 COMPREHEN METABOLIC PANEL: CPT

## 2018-06-22 PROCEDURE — 84439 ASSAY OF FREE THYROXINE: CPT

## 2018-06-22 PROCEDURE — 71045 X-RAY EXAM CHEST 1 VIEW: CPT

## 2018-06-22 PROCEDURE — 84443 ASSAY THYROID STIM HORMONE: CPT

## 2018-06-22 PROCEDURE — 85027 COMPLETE CBC AUTOMATED: CPT

## 2018-06-22 PROCEDURE — 2580000003 HC RX 258: Performed by: PSYCHIATRY & NEUROLOGY

## 2018-06-22 PROCEDURE — 85610 PROTHROMBIN TIME: CPT

## 2018-06-22 PROCEDURE — 99223 1ST HOSP IP/OBS HIGH 75: CPT | Performed by: INTERNAL MEDICINE

## 2018-06-22 PROCEDURE — 36415 COLL VENOUS BLD VENIPUNCTURE: CPT

## 2018-06-22 PROCEDURE — 6370000000 HC RX 637 (ALT 250 FOR IP): Performed by: SURGERY

## 2018-06-22 PROCEDURE — 6360000002 HC RX W HCPCS: Performed by: PSYCHIATRY & NEUROLOGY

## 2018-06-22 PROCEDURE — 2500000003 HC RX 250 WO HCPCS: Performed by: PSYCHIATRY & NEUROLOGY

## 2018-06-22 PROCEDURE — 6370000000 HC RX 637 (ALT 250 FOR IP): Performed by: PSYCHIATRY & NEUROLOGY

## 2018-06-22 PROCEDURE — 8010000000 HC HEMODIALYSIS ACUTE INPT

## 2018-06-22 PROCEDURE — 6370000000 HC RX 637 (ALT 250 FOR IP): Performed by: NURSE PRACTITIONER

## 2018-06-22 PROCEDURE — 83735 ASSAY OF MAGNESIUM: CPT

## 2018-06-22 PROCEDURE — 6370000000 HC RX 637 (ALT 250 FOR IP): Performed by: PHYSICIAN ASSISTANT

## 2018-06-22 PROCEDURE — 2100000000 HC CCU R&B

## 2018-06-22 PROCEDURE — 82948 REAGENT STRIP/BLOOD GLUCOSE: CPT

## 2018-06-22 RX ORDER — SODIUM CHLORIDE 0.9 % (FLUSH) 0.9 %
10 SYRINGE (ML) INJECTION PRN
Status: CANCELLED | OUTPATIENT
Start: 2018-06-22

## 2018-06-22 RX ORDER — ATORVASTATIN CALCIUM 20 MG/1
20 TABLET, FILM COATED ORAL DAILY
Status: DISCONTINUED | OUTPATIENT
Start: 2018-06-22 | End: 2018-06-26

## 2018-06-22 RX ORDER — ACETAMINOPHEN 325 MG/1
650 TABLET ORAL EVERY 4 HOURS PRN
Status: DISCONTINUED | OUTPATIENT
Start: 2018-06-22 | End: 2018-06-29 | Stop reason: HOSPADM

## 2018-06-22 RX ORDER — HYDROCODONE BITARTRATE AND ACETAMINOPHEN 5; 325 MG/1; MG/1
1 TABLET ORAL EVERY 4 HOURS PRN
Status: CANCELLED | OUTPATIENT
Start: 2018-06-22

## 2018-06-22 RX ORDER — ALBUMIN (HUMAN) 12.5 G/50ML
12.5 SOLUTION INTRAVENOUS PRN
Status: DISCONTINUED | OUTPATIENT
Start: 2018-06-22 | End: 2018-06-22

## 2018-06-22 RX ORDER — DEXTROSE MONOHYDRATE 25 G/50ML
12.5 INJECTION, SOLUTION INTRAVENOUS PRN
Status: DISCONTINUED | OUTPATIENT
Start: 2018-06-22 | End: 2018-06-29 | Stop reason: HOSPADM

## 2018-06-22 RX ORDER — ALBUMIN (HUMAN) 12.5 G/50ML
12.5 SOLUTION INTRAVENOUS PRN
Status: CANCELLED | OUTPATIENT
Start: 2018-06-22

## 2018-06-22 RX ORDER — METOPROLOL TARTRATE 5 MG/5ML
5 INJECTION INTRAVENOUS ONCE
Status: COMPLETED | OUTPATIENT
Start: 2018-06-22 | End: 2018-06-22

## 2018-06-22 RX ORDER — CLONIDINE HYDROCHLORIDE 0.1 MG/1
0.1 TABLET ORAL
Status: CANCELLED | OUTPATIENT
Start: 2018-06-22

## 2018-06-22 RX ORDER — DEXTROSE MONOHYDRATE 50 MG/ML
100 INJECTION, SOLUTION INTRAVENOUS PRN
Status: CANCELLED | OUTPATIENT
Start: 2018-06-22

## 2018-06-22 RX ORDER — METOPROLOL TARTRATE 5 MG/5ML
5 INJECTION INTRAVENOUS ONCE
Status: CANCELLED | OUTPATIENT
Start: 2018-06-22

## 2018-06-22 RX ORDER — DEXTROSE MONOHYDRATE 50 MG/ML
100 INJECTION, SOLUTION INTRAVENOUS PRN
Status: DISCONTINUED | OUTPATIENT
Start: 2018-06-22 | End: 2018-06-29 | Stop reason: HOSPADM

## 2018-06-22 RX ORDER — ACETAMINOPHEN 325 MG/1
650 TABLET ORAL EVERY 4 HOURS PRN
Status: CANCELLED | OUTPATIENT
Start: 2018-06-22

## 2018-06-22 RX ORDER — HYDROCODONE BITARTRATE AND ACETAMINOPHEN 5; 325 MG/1; MG/1
2 TABLET ORAL EVERY 4 HOURS PRN
Status: CANCELLED | OUTPATIENT
Start: 2018-06-22

## 2018-06-22 RX ORDER — DIGOXIN 0.25 MG/ML
500 INJECTION INTRAMUSCULAR; INTRAVENOUS ONCE
Status: COMPLETED | OUTPATIENT
Start: 2018-06-22 | End: 2018-06-22

## 2018-06-22 RX ORDER — HYDROCODONE BITARTRATE AND ACETAMINOPHEN 5; 325 MG/1; MG/1
2 TABLET ORAL EVERY 4 HOURS PRN
Status: DISCONTINUED | OUTPATIENT
Start: 2018-06-22 | End: 2018-06-29 | Stop reason: HOSPADM

## 2018-06-22 RX ORDER — HEPARIN SODIUM (PORCINE) LOCK FLUSH IV SOLN 100 UNIT/ML 100 UNIT/ML
200 SOLUTION INTRAVENOUS PRN
Status: CANCELLED | OUTPATIENT
Start: 2018-06-22

## 2018-06-22 RX ORDER — IPRATROPIUM BROMIDE AND ALBUTEROL SULFATE 2.5; .5 MG/3ML; MG/3ML
1 SOLUTION RESPIRATORY (INHALATION)
Status: DISCONTINUED | OUTPATIENT
Start: 2018-06-22 | End: 2018-06-22

## 2018-06-22 RX ORDER — FAMOTIDINE 20 MG/1
20 TABLET, FILM COATED ORAL DAILY
Status: DISCONTINUED | OUTPATIENT
Start: 2018-06-23 | End: 2018-06-26

## 2018-06-22 RX ORDER — SODIUM CHLORIDE 0.9 % (FLUSH) 0.9 %
10 SYRINGE (ML) INJECTION EVERY 12 HOURS SCHEDULED
Status: DISCONTINUED | OUTPATIENT
Start: 2018-06-22 | End: 2018-06-29 | Stop reason: HOSPADM

## 2018-06-22 RX ORDER — CHLORHEXIDINE GLUCONATE 0.12 MG/ML
15 RINSE ORAL 2 TIMES DAILY
Status: CANCELLED | OUTPATIENT
Start: 2018-06-22

## 2018-06-22 RX ORDER — TAMSULOSIN HYDROCHLORIDE 0.4 MG/1
0.4 CAPSULE ORAL DAILY
Status: DISCONTINUED | OUTPATIENT
Start: 2018-06-22 | End: 2018-06-29 | Stop reason: HOSPADM

## 2018-06-22 RX ORDER — HEPARIN SODIUM (PORCINE) LOCK FLUSH IV SOLN 100 UNIT/ML 100 UNIT/ML
200 SOLUTION INTRAVENOUS PRN
Status: DISCONTINUED | OUTPATIENT
Start: 2018-06-22 | End: 2018-06-22

## 2018-06-22 RX ORDER — DOCUSATE SODIUM 100 MG/1
100 CAPSULE, LIQUID FILLED ORAL 2 TIMES DAILY PRN
Status: CANCELLED | OUTPATIENT
Start: 2018-06-22

## 2018-06-22 RX ORDER — DOCUSATE SODIUM 100 MG/1
100 CAPSULE, LIQUID FILLED ORAL 2 TIMES DAILY PRN
Status: DISCONTINUED | OUTPATIENT
Start: 2018-06-22 | End: 2018-06-29 | Stop reason: HOSPADM

## 2018-06-22 RX ORDER — ONDANSETRON 2 MG/ML
4 INJECTION INTRAMUSCULAR; INTRAVENOUS EVERY 6 HOURS PRN
Status: CANCELLED | OUTPATIENT
Start: 2018-06-22

## 2018-06-22 RX ORDER — DEXTROSE MONOHYDRATE 25 G/50ML
12.5 INJECTION, SOLUTION INTRAVENOUS PRN
Status: CANCELLED | OUTPATIENT
Start: 2018-06-22

## 2018-06-22 RX ORDER — LORAZEPAM 2 MG/ML
1 INJECTION INTRAMUSCULAR EVERY 4 HOURS PRN
Status: DISCONTINUED | OUTPATIENT
Start: 2018-06-22 | End: 2018-06-29 | Stop reason: HOSPADM

## 2018-06-22 RX ORDER — HEPARIN SODIUM 1000 [USP'U]/ML
2000 INJECTION, SOLUTION INTRAVENOUS; SUBCUTANEOUS PRN
Status: CANCELLED | OUTPATIENT
Start: 2018-06-22

## 2018-06-22 RX ORDER — BISACODYL 10 MG
10 SUPPOSITORY, RECTAL RECTAL DAILY PRN
Status: DISCONTINUED | OUTPATIENT
Start: 2018-06-22 | End: 2018-06-29 | Stop reason: HOSPADM

## 2018-06-22 RX ORDER — CLONIDINE HYDROCHLORIDE 0.1 MG/1
0.1 TABLET ORAL
Status: DISCONTINUED | OUTPATIENT
Start: 2018-06-22 | End: 2018-06-29 | Stop reason: HOSPADM

## 2018-06-22 RX ORDER — LORAZEPAM 2 MG/ML
1 INJECTION INTRAMUSCULAR EVERY 4 HOURS PRN
Status: CANCELLED | OUTPATIENT
Start: 2018-06-22

## 2018-06-22 RX ORDER — ADENOSINE 3 MG/ML
6 INJECTION, SOLUTION INTRAVENOUS ONCE
Status: COMPLETED | OUTPATIENT
Start: 2018-06-22 | End: 2018-06-22

## 2018-06-22 RX ORDER — SODIUM CHLORIDE 0.9 % (FLUSH) 0.9 %
10 SYRINGE (ML) INJECTION EVERY 12 HOURS SCHEDULED
Status: CANCELLED | OUTPATIENT
Start: 2018-06-22

## 2018-06-22 RX ORDER — DIGOXIN 0.25 MG/ML
250 INJECTION INTRAMUSCULAR; INTRAVENOUS ONCE
Status: COMPLETED | OUTPATIENT
Start: 2018-06-22 | End: 2018-06-22

## 2018-06-22 RX ORDER — MORPHINE SULFATE 1 MG/ML
2 INJECTION, SOLUTION EPIDURAL; INTRATHECAL; INTRAVENOUS
Status: DISCONTINUED | OUTPATIENT
Start: 2018-06-22 | End: 2018-06-29 | Stop reason: HOSPADM

## 2018-06-22 RX ORDER — HYDRALAZINE HYDROCHLORIDE 20 MG/ML
10 INJECTION INTRAMUSCULAR; INTRAVENOUS EVERY 6 HOURS PRN
Status: DISCONTINUED | OUTPATIENT
Start: 2018-06-22 | End: 2018-06-24

## 2018-06-22 RX ORDER — ASPIRIN 81 MG/1
81 TABLET ORAL DAILY
Status: CANCELLED | OUTPATIENT
Start: 2018-06-23

## 2018-06-22 RX ORDER — METOPROLOL TARTRATE 5 MG/5ML
5 INJECTION INTRAVENOUS ONCE
Status: DISCONTINUED | OUTPATIENT
Start: 2018-06-22 | End: 2018-06-22

## 2018-06-22 RX ORDER — ASPIRIN 81 MG/1
81 TABLET ORAL DAILY
Status: DISCONTINUED | OUTPATIENT
Start: 2018-06-23 | End: 2018-06-29 | Stop reason: HOSPADM

## 2018-06-22 RX ORDER — IPRATROPIUM BROMIDE AND ALBUTEROL SULFATE 2.5; .5 MG/3ML; MG/3ML
1 SOLUTION RESPIRATORY (INHALATION)
Status: CANCELLED | OUTPATIENT
Start: 2018-06-22

## 2018-06-22 RX ORDER — DILTIAZEM HYDROCHLORIDE 5 MG/ML
10 INJECTION INTRAVENOUS ONCE
Status: COMPLETED | OUTPATIENT
Start: 2018-06-22 | End: 2018-06-22

## 2018-06-22 RX ORDER — BISACODYL 10 MG
10 SUPPOSITORY, RECTAL RECTAL DAILY PRN
Status: CANCELLED | OUTPATIENT
Start: 2018-06-22

## 2018-06-22 RX ORDER — HYDROCODONE BITARTRATE AND ACETAMINOPHEN 5; 325 MG/1; MG/1
1 TABLET ORAL EVERY 4 HOURS PRN
Status: DISCONTINUED | OUTPATIENT
Start: 2018-06-22 | End: 2018-06-29 | Stop reason: HOSPADM

## 2018-06-22 RX ORDER — NICOTINE POLACRILEX 4 MG
15 LOZENGE BUCCAL PRN
Status: DISCONTINUED | OUTPATIENT
Start: 2018-06-22 | End: 2018-06-29 | Stop reason: HOSPADM

## 2018-06-22 RX ORDER — HEPARIN SODIUM 1000 [USP'U]/ML
2000 INJECTION, SOLUTION INTRAVENOUS; SUBCUTANEOUS PRN
Status: DISCONTINUED | OUTPATIENT
Start: 2018-06-22 | End: 2018-06-29

## 2018-06-22 RX ORDER — HEPARIN SODIUM (PORCINE) LOCK FLUSH IV SOLN 100 UNIT/ML 100 UNIT/ML
200 SOLUTION INTRAVENOUS PRN
Status: DISCONTINUED | OUTPATIENT
Start: 2018-06-22 | End: 2018-06-29 | Stop reason: HOSPADM

## 2018-06-22 RX ORDER — CHLORHEXIDINE GLUCONATE 0.12 MG/ML
15 RINSE ORAL 2 TIMES DAILY
Status: DISCONTINUED | OUTPATIENT
Start: 2018-06-22 | End: 2018-06-29 | Stop reason: HOSPADM

## 2018-06-22 RX ORDER — FAMOTIDINE 20 MG/1
20 TABLET, FILM COATED ORAL DAILY
Status: CANCELLED | OUTPATIENT
Start: 2018-06-23

## 2018-06-22 RX ORDER — MORPHINE SULFATE 1 MG/ML
2 INJECTION, SOLUTION EPIDURAL; INTRATHECAL; INTRAVENOUS
Status: CANCELLED | OUTPATIENT
Start: 2018-06-22

## 2018-06-22 RX ORDER — IPRATROPIUM BROMIDE AND ALBUTEROL SULFATE 2.5; .5 MG/3ML; MG/3ML
1 SOLUTION RESPIRATORY (INHALATION) EVERY 4 HOURS PRN
Status: DISCONTINUED | OUTPATIENT
Start: 2018-06-22 | End: 2018-06-29 | Stop reason: HOSPADM

## 2018-06-22 RX ORDER — HEPARIN SODIUM 1000 [USP'U]/ML
2000 INJECTION, SOLUTION INTRAVENOUS; SUBCUTANEOUS PRN
Status: DISCONTINUED | OUTPATIENT
Start: 2018-06-22 | End: 2018-06-22

## 2018-06-22 RX ORDER — NICOTINE POLACRILEX 4 MG
15 LOZENGE BUCCAL PRN
Status: CANCELLED | OUTPATIENT
Start: 2018-06-22

## 2018-06-22 RX ORDER — SODIUM CHLORIDE 0.9 % (FLUSH) 0.9 %
10 SYRINGE (ML) INJECTION PRN
Status: DISCONTINUED | OUTPATIENT
Start: 2018-06-22 | End: 2018-06-29 | Stop reason: HOSPADM

## 2018-06-22 RX ORDER — ONDANSETRON 2 MG/ML
4 INJECTION INTRAMUSCULAR; INTRAVENOUS EVERY 6 HOURS PRN
Status: DISCONTINUED | OUTPATIENT
Start: 2018-06-22 | End: 2018-06-22

## 2018-06-22 RX ORDER — HYDRALAZINE HYDROCHLORIDE 20 MG/ML
10 INJECTION INTRAMUSCULAR; INTRAVENOUS EVERY 6 HOURS PRN
Status: CANCELLED | OUTPATIENT
Start: 2018-06-22

## 2018-06-22 RX ADMIN — AMIODARONE HYDROCHLORIDE 0.5 MG/MIN: 50 INJECTION, SOLUTION INTRAVENOUS at 23:54

## 2018-06-22 RX ADMIN — DEXTROSE MONOHYDRATE 150 MG: 50 INJECTION, SOLUTION INTRAVENOUS at 16:21

## 2018-06-22 RX ADMIN — Medication 10 ML: at 12:42

## 2018-06-22 RX ADMIN — DIGOXIN 250 MCG: 250 INJECTION, SOLUTION INTRAMUSCULAR; INTRAVENOUS; PARENTERAL at 15:54

## 2018-06-22 RX ADMIN — Medication 10 ML: at 12:41

## 2018-06-22 RX ADMIN — METOPROLOL TARTRATE 25 MG: 25 TABLET, FILM COATED ORAL at 10:28

## 2018-06-22 RX ADMIN — TAMSULOSIN HYDROCHLORIDE 0.4 MG: 0.4 CAPSULE ORAL at 13:10

## 2018-06-22 RX ADMIN — AMIODARONE HYDROCHLORIDE 1 MG/MIN: 50 INJECTION, SOLUTION INTRAVENOUS at 13:07

## 2018-06-22 RX ADMIN — HYDROCODONE BITARTRATE AND ACETAMINOPHEN 2 TABLET: 5; 325 TABLET ORAL at 20:22

## 2018-06-22 RX ADMIN — DEXTROSE MONOHYDRATE 150 MG: 50 INJECTION, SOLUTION INTRAVENOUS at 11:27

## 2018-06-22 RX ADMIN — ALTEPLASE 2 MG: 2.2 INJECTION, POWDER, LYOPHILIZED, FOR SOLUTION INTRAVENOUS at 10:10

## 2018-06-22 RX ADMIN — ATORVASTATIN CALCIUM 20 MG: 20 TABLET, FILM COATED ORAL at 13:10

## 2018-06-22 RX ADMIN — METOPROLOL TARTRATE 5 MG: 1 INJECTION, SOLUTION INTRAVENOUS at 10:30

## 2018-06-22 RX ADMIN — METOPROLOL TARTRATE 25 MG: 25 TABLET ORAL at 20:22

## 2018-06-22 RX ADMIN — METOPROLOL TARTRATE 5 MG: 5 INJECTION, SOLUTION INTRAVENOUS at 10:15

## 2018-06-22 RX ADMIN — IPRATROPIUM BROMIDE AND ALBUTEROL SULFATE 1 AMPULE: .5; 3 SOLUTION RESPIRATORY (INHALATION) at 06:13

## 2018-06-22 RX ADMIN — DILTIAZEM HYDROCHLORIDE 5 MG/HR: 5 INJECTION INTRAVENOUS at 20:06

## 2018-06-22 RX ADMIN — SODIUM CHLORIDE 1.5 G: 900 INJECTION INTRAVENOUS at 18:57

## 2018-06-22 RX ADMIN — DIGOXIN 500 MCG: 250 INJECTION, SOLUTION INTRAMUSCULAR; INTRAVENOUS; PARENTERAL at 12:42

## 2018-06-22 RX ADMIN — METOPROLOL TARTRATE 25 MG: 25 TABLET, FILM COATED ORAL at 10:45

## 2018-06-22 RX ADMIN — Medication 10 MG: at 20:05

## 2018-06-22 RX ADMIN — Medication 10 ML: at 15:54

## 2018-06-22 RX ADMIN — ADENOSINE 6 MG: 3 SOLUTION INTRAVENOUS at 10:32

## 2018-06-22 ASSESSMENT — PAIN DESCRIPTION - DESCRIPTORS
DESCRIPTORS: ACHING
DESCRIPTORS: ACHING

## 2018-06-22 ASSESSMENT — PAIN SCALES - GENERAL
PAINLEVEL_OUTOF10: 0
PAINLEVEL_OUTOF10: 2
PAINLEVEL_OUTOF10: 0
PAINLEVEL_OUTOF10: 2
PAINLEVEL_OUTOF10: 0
PAINLEVEL_OUTOF10: 5

## 2018-06-22 ASSESSMENT — PAIN DESCRIPTION - PAIN TYPE
TYPE: CHRONIC PAIN
TYPE: CHRONIC PAIN

## 2018-06-22 ASSESSMENT — PAIN DESCRIPTION - LOCATION
LOCATION: FOOT
LOCATION: FOOT

## 2018-06-22 ASSESSMENT — PAIN DESCRIPTION - FREQUENCY
FREQUENCY: CONTINUOUS
FREQUENCY: CONTINUOUS

## 2018-06-22 ASSESSMENT — PAIN DESCRIPTION - ORIENTATION
ORIENTATION: LEFT
ORIENTATION: LEFT

## 2018-06-22 NOTE — PROGRESS NOTES
Called [facility] rehab RN to take report on pt.    Electronically signed by Messi Martin RN on 6/22/2018 at 11:49 AM

## 2018-06-22 NOTE — H&P
[facility] - History & Physical      PCP: María Enrique DO    Date of Admission: 6/22/2018    Date of Service: 6/22/2018    Chief Complaint:  Tachycardia    History Of Present Illness: The patient is a 81 y.o. male who has a recent complicated history following AAA repair. He was admitted to [facility] for AAA repair per Dr. Capri Tripathi. Immediate postoperative bleeding discovered while still in the operating room led to the patient being re-opened and bleeding controlled and required several units of PRBCs, platelets, and FFPs. He became hypotensive, requiring fluid boluses and pressor support. He was admitted to the ICU. During that admission, he was followed by vascular surgery, hospitalists, nephrology, gastroenterology, and general surgery. Postoperatively, there was concern for ischemic colitis. Sigmoidoscopy was obtained, revealing ischemic areas and erythema down the rectum. Exploratory laparotomy was performed and was negative for ischemic bowel. He then developed NORY without renal recovery and is now requiring dialysis treatments. He was admitted to rehab to participate in speech therapy, physical therapy, and occupational therapy. We were consulted to assist with medical management in rehab. He was started on IV antibiotic therapy yesterday, due to cough with sputum production, fever, and leukocytosis. This morning, I was called by dialysis staff. Towards the end of his dialysis treatment, the patient became tachycardic 140-150s. His treatment was stopped. Upon evaluation, the patient was asymptomatic, denying chest pain or palpitations, shortness of breath, dizziness. EKG was obtained and was consistent with SVT. Despite coughing forcefully and bearing down, his heart rate remained elevated. He was given IV metoprolol without response. It was necessary to admit the patient to CCU for adenosine administration.   Rehab RN was called and mouth nightly     Historical Provider, MD   Omega-3 Fatty Acids (OMEGA-3 CF PO) Take by mouth daily     Historical Provider, MD   meloxicam (MOBIC) 15 MG tablet Take 1 tablet by mouth daily 3/16/18   MATTHEW Adame DO   tamsulosin (FLOMAX) 0.4 MG capsule TAKE 1 CAPSULE BY MOUTH EVERY DAY 3/16/18   B Asa Adame DO   atorvastatin (LIPITOR) 20 MG tablet TAKE 1 TABLET BY MOUTH EVERY DAY 3/16/18   B Asa Adame DO   oxyCODONE-acetaminophen (PERCOCET) 5-325 MG per tablet Take 1 tablet by mouth as needed for Pain . Historical Provider, MD       Allergies:    Patient has no known allergies. Social History:    Tobacco:   reports that he has been smoking Cigarettes. He has a 25.00 pack-year smoking history. He has never used smokeless tobacco.  Alcohol:   reports that he drinks alcohol. Illicit Drugs: denies    Family History:      Problem Relation Age of Onset    Other Mother     Cancer Father        Review of Systems:   Constitutional / general:  + Fatigue. No fever / chills / sweats  HEENT: No sore throat / hoarseness / vision changes  CV:  + Tachycardia. No chest pain / palpitations/ orthopnea   Respiratory:  + Cough, sputum. No shortness of breath / hemoptysis  GI: No nausea / vomiting / abdominal pain / diarrhea / constipation  :  No dysuria / hesitancy / urgency / hematuria   Neuro: + Generalized weakness, dysphagia. No headache / paresthesias  Musculoskeletal:  + Left foot pain. No edema / cyanosis   Psychiatric:  No depression / anxiety / insomnia  Skin:  + Sacral wounds. No new rashes / lesions  Remainder of a 14 point review of systems is negative except as mentioned above. Physical Examination:  There were no vitals taken for this visit. General appearance: No apparent distress, appears stated age and cooperative. HEENT: Normal cephalic, atraumatic without obvious deformity. Pupils equal, round, and reactive to light. Extra ocular muscles intact.  Conjunctivae/corneas

## 2018-06-22 NOTE — PROGRESS NOTES
PT transported to unit via Dialysis RNs. PT connected to [place] and CCU monitors. Pt lying in bed, no signs of distress/complications noted. Adenosine 6mg administered followed by 2 flushes. PT responded to medication momentarily then quickly returned to AFL @ 155  Dr Arnaldo Velazquez consult made by Dr Maryanne Hdez personally.   Electronically signed by Azael Villeda RN on 6/22/2018 at 11:03 AM

## 2018-06-22 NOTE — PROGRESS NOTES
Wound Care  Spoke with Ela Hand PA-C via phone re:wound to left lateral thigh/hip, vascular is consulted to see patient. Discussed with Mya Hardwick RN to have vascular see wound on rounds for additional wound care recommendations.

## 2018-06-22 NOTE — PROGRESS NOTES
Dr Amber Hitchcock paged regarding intermitten compression devices. Questioned regarding pt vascular complications. Dr Amber Hitchcock gave telephone consent to disregard SCDs until Dr Brooke/Vascular surgery approves. Dr Amber Hitchcock also notified that Unasyn and amiodarone gtt are not compatible. Pt has poor venous access. Trying to get another peripheral, Dr Amber Hitchcock voiced that the Unasyn can be put on hold until pt is off Amio gtt or another access is obtained.  No other PO medications can be given at this time  Electronically signed by Mary Anne Tucker RN on 6/22/2018 at 5:58 PM

## 2018-06-23 LAB
ALBUMIN SERPL-MCNC: 2.2 G/DL (ref 3.5–5.2)
ALP BLD-CCNC: 90 U/L (ref 40–130)
ALT SERPL-CCNC: 19 U/L (ref 5–41)
ANION GAP SERPL CALCULATED.3IONS-SCNC: 16 MMOL/L (ref 7–19)
AST SERPL-CCNC: 43 U/L (ref 5–40)
BASOPHILS ABSOLUTE: 0 K/UL (ref 0–0.2)
BASOPHILS RELATIVE PERCENT: 0.2 % (ref 0–1)
BILIRUB SERPL-MCNC: 0.3 MG/DL (ref 0.2–1.2)
BUN BLDV-MCNC: 43 MG/DL (ref 8–23)
CALCIUM SERPL-MCNC: 7.8 MG/DL (ref 8.8–10.2)
CHLORIDE BLD-SCNC: 98 MMOL/L (ref 98–111)
CO2: 23 MMOL/L (ref 22–29)
CREAT SERPL-MCNC: 6.7 MG/DL (ref 0.5–1.2)
EOSINOPHILS ABSOLUTE: 0.2 K/UL (ref 0–0.6)
EOSINOPHILS RELATIVE PERCENT: 1.4 % (ref 0–5)
GFR NON-AFRICAN AMERICAN: 8
GLUCOSE BLD-MCNC: 120 MG/DL (ref 70–99)
GLUCOSE BLD-MCNC: 122 MG/DL (ref 70–99)
GLUCOSE BLD-MCNC: 132 MG/DL (ref 74–109)
GLUCOSE BLD-MCNC: 147 MG/DL (ref 70–99)
HCT VFR BLD CALC: 26.9 % (ref 42–52)
HEMOGLOBIN: 8.3 G/DL (ref 14–18)
LYMPHOCYTES ABSOLUTE: 1.3 K/UL (ref 1.1–4.5)
LYMPHOCYTES RELATIVE PERCENT: 7.7 % (ref 20–40)
MCH RBC QN AUTO: 29.1 PG (ref 27–31)
MCHC RBC AUTO-ENTMCNC: 30.9 G/DL (ref 33–37)
MCV RBC AUTO: 94.4 FL (ref 80–94)
MONOCYTES ABSOLUTE: 1.5 K/UL (ref 0–0.9)
MONOCYTES RELATIVE PERCENT: 8.9 % (ref 0–10)
NEUTROPHILS ABSOLUTE: 13.7 K/UL (ref 1.5–7.5)
NEUTROPHILS RELATIVE PERCENT: 79.8 % (ref 50–65)
PDW BLD-RTO: 15 % (ref 11.5–14.5)
PERFORMED ON: ABNORMAL
PLATELET # BLD: 498 K/UL (ref 130–400)
PMV BLD AUTO: 10.5 FL (ref 9.4–12.4)
POTASSIUM SERPL-SCNC: 4 MMOL/L (ref 3.5–5)
RBC # BLD: 2.85 M/UL (ref 4.7–6.1)
SODIUM BLD-SCNC: 137 MMOL/L (ref 136–145)
TOTAL PROTEIN: 4.9 G/DL (ref 6.6–8.7)
WBC # BLD: 17.2 K/UL (ref 4.8–10.8)

## 2018-06-23 PROCEDURE — 85025 COMPLETE CBC W/AUTO DIFF WBC: CPT

## 2018-06-23 PROCEDURE — 93005 ELECTROCARDIOGRAM TRACING: CPT

## 2018-06-23 PROCEDURE — 6360000002 HC RX W HCPCS: Performed by: INTERNAL MEDICINE

## 2018-06-23 PROCEDURE — 5A2204Z RESTORATION OF CARDIAC RHYTHM, SINGLE: ICD-10-PCS | Performed by: INTERNAL MEDICINE

## 2018-06-23 PROCEDURE — 36415 COLL VENOUS BLD VENIPUNCTURE: CPT

## 2018-06-23 PROCEDURE — 82948 REAGENT STRIP/BLOOD GLUCOSE: CPT

## 2018-06-23 PROCEDURE — 99233 SBSQ HOSP IP/OBS HIGH 50: CPT | Performed by: INTERNAL MEDICINE

## 2018-06-23 PROCEDURE — 2580000003 HC RX 258: Performed by: PSYCHIATRY & NEUROLOGY

## 2018-06-23 PROCEDURE — 2580000003 HC RX 258: Performed by: INTERNAL MEDICINE

## 2018-06-23 PROCEDURE — 99024 POSTOP FOLLOW-UP VISIT: CPT | Performed by: INTERNAL MEDICINE

## 2018-06-23 PROCEDURE — 80053 COMPREHEN METABOLIC PANEL: CPT

## 2018-06-23 PROCEDURE — 87070 CULTURE OTHR SPECIMN AEROBIC: CPT

## 2018-06-23 PROCEDURE — 87205 SMEAR GRAM STAIN: CPT

## 2018-06-23 PROCEDURE — 2100000000 HC CCU R&B

## 2018-06-23 PROCEDURE — 92960 CARDIOVERSION ELECTRIC EXT: CPT | Performed by: INTERNAL MEDICINE

## 2018-06-23 PROCEDURE — 6370000000 HC RX 637 (ALT 250 FOR IP): Performed by: PHYSICIAN ASSISTANT

## 2018-06-23 PROCEDURE — 6370000000 HC RX 637 (ALT 250 FOR IP): Performed by: PSYCHIATRY & NEUROLOGY

## 2018-06-23 RX ORDER — FENTANYL CITRATE 50 UG/ML
INJECTION, SOLUTION INTRAMUSCULAR; INTRAVENOUS
Status: COMPLETED | OUTPATIENT
Start: 2018-06-23 | End: 2018-06-23

## 2018-06-23 RX ORDER — VANCOMYCIN HYDROCHLORIDE 1 G/200ML
1000 INJECTION, SOLUTION INTRAVENOUS ONCE
Status: COMPLETED | OUTPATIENT
Start: 2018-06-23 | End: 2018-06-23

## 2018-06-23 RX ORDER — MIDAZOLAM HYDROCHLORIDE 1 MG/ML
INJECTION INTRAMUSCULAR; INTRAVENOUS
Status: COMPLETED | OUTPATIENT
Start: 2018-06-23 | End: 2018-06-23

## 2018-06-23 RX ORDER — MIDAZOLAM HYDROCHLORIDE 1 MG/ML
4 INJECTION INTRAMUSCULAR; INTRAVENOUS ONCE
Status: COMPLETED | OUTPATIENT
Start: 2018-06-23 | End: 2018-06-23

## 2018-06-23 RX ORDER — FENTANYL CITRATE 50 UG/ML
50 INJECTION, SOLUTION INTRAMUSCULAR; INTRAVENOUS ONCE
Status: COMPLETED | OUTPATIENT
Start: 2018-06-23 | End: 2018-06-23

## 2018-06-23 RX ADMIN — FENTANYL CITRATE 25 MCG: 50 INJECTION, SOLUTION INTRAMUSCULAR; INTRAVENOUS at 11:53

## 2018-06-23 RX ADMIN — ATORVASTATIN CALCIUM 20 MG: 20 TABLET, FILM COATED ORAL at 10:25

## 2018-06-23 RX ADMIN — METOPROLOL TARTRATE 25 MG: 25 TABLET ORAL at 21:24

## 2018-06-23 RX ADMIN — HYDROCODONE BITARTRATE AND ACETAMINOPHEN 2 TABLET: 5; 325 TABLET ORAL at 21:24

## 2018-06-23 RX ADMIN — ASPIRIN 81 MG: 81 TABLET, COATED ORAL at 10:24

## 2018-06-23 RX ADMIN — Medication 10 ML: at 08:00

## 2018-06-23 RX ADMIN — VANCOMYCIN HYDROCHLORIDE 1000 MG: 1 INJECTION, SOLUTION INTRAVENOUS at 13:12

## 2018-06-23 RX ADMIN — FENTANYL CITRATE 25 MCG: 50 INJECTION, SOLUTION INTRAMUSCULAR; INTRAVENOUS at 11:49

## 2018-06-23 RX ADMIN — MIDAZOLAM 1 MG: 1 INJECTION INTRAMUSCULAR; INTRAVENOUS at 11:49

## 2018-06-23 RX ADMIN — METOPROLOL TARTRATE 25 MG: 25 TABLET ORAL at 10:24

## 2018-06-23 RX ADMIN — FAMOTIDINE 20 MG: 20 TABLET ORAL at 10:25

## 2018-06-23 RX ADMIN — SODIUM CHLORIDE 1.5 G: 900 INJECTION INTRAVENOUS at 19:31

## 2018-06-23 RX ADMIN — MIDAZOLAM HYDROCHLORIDE 1 MG: 1 INJECTION, SOLUTION INTRAMUSCULAR; INTRAVENOUS at 11:52

## 2018-06-23 RX ADMIN — MIDAZOLAM HYDROCHLORIDE 1 MG: 1 INJECTION, SOLUTION INTRAMUSCULAR; INTRAVENOUS at 11:55

## 2018-06-23 RX ADMIN — CHLORHEXIDINE GLUCONATE 15 ML: 1.2 RINSE ORAL at 21:32

## 2018-06-23 RX ADMIN — Medication 10 ML: at 21:32

## 2018-06-23 ASSESSMENT — PAIN SCALES - WONG BAKER
WONGBAKER_NUMERICALRESPONSE: 0
WONGBAKER_NUMERICALRESPONSE: 0

## 2018-06-23 ASSESSMENT — PAIN SCALES - GENERAL
PAINLEVEL_OUTOF10: 0
PAINLEVEL_OUTOF10: 0
PAINLEVEL_OUTOF10: 7
PAINLEVEL_OUTOF10: 0
PAINLEVEL_OUTOF10: 2
PAINLEVEL_OUTOF10: 0

## 2018-06-23 NOTE — PROGRESS NOTES
Pharmacy Note  Vancomycin Consult    Lis Hester is a 81 y.o. male started on Vancomycin for infection; consult received from Dr. Marylou Argueta to manage therapy. Also receiving the following antibiotics: Unasyn. Patient Active Problem List   Diagnosis    BPH (benign prostatic hyperplasia)    Essential hypertension    Tobacco use    Primary osteoarthritis involving multiple joints    Mixed hyperlipidemia    [facility]    Type 2 diabetes mellitus with complication, without long-term current use of insulin (HCC)    Acute blood loss as cause of postoperative anemia    Coagulopathy ([facility])    Hyperkalemia    Acute renal failure (ARF) ([facility])    [facility]schem[facility]    High serum lactate    LFTs abnormal    Anemia    Internal hemorrhoid    Hypocalcemia    S/P AAA repair    Weakness    Gait abnormality    Pain in left leg    SVT (supraventricular tachycardia) ([facility])    NORY (acute kidney injury) ([facility]    ATN (acute tubular necrosis) ([facility])    Leukocytosis    Atrial flutter with rapid ventricular response ([facility])       Allergies:  Patient has no known allergies. Temp max: 98.5    Recent Labs      06/22/18   0515  06/23/18   0619   BUN  50*  43*       Recent Labs      06/22/18   0515  06/23/18   0619   CREATININE  7.0*  6.7*       Recent Labs      06/22/18   0515  06/23/18   0619   WBC  19.6*  17.2*         Intake/Output Summary (Last 24 hours) at 06/23/18 0956  Last data filed at 06/23/18 0500   Gross per 24 hour   Intake 958.4 ml   Output 0 ml   Net 958.4 ml       Culture Date Source Results   06/22/18 Blood x 2 Pending   06/23/18 Respiratory Ordered            Ht Readings from Last 1 Encounters:   06/22/18 6' 1\" (1.854 m)        Wt Readings from Last 1 Encounters:   06/23/18 179 lb (81.2 kg)         Body mass index is 23.62 kg/m². Estimated Creatinine Clearance: 12 mL/min (A) (based on SCr of 6.7 mg/dL (H)).       Assessment/Plan:  Will initiate vancomycin pulse dose secondary to NORY / possible dialysis. Timing of trough level will be determined based on culture results, renal function, and clinical response. Thank you for the consult. Will continue to follow.     Electronically signed by Luisito Easton, [facility] on 6/23/2018 at 9:56 AM

## 2018-06-23 NOTE — PROGRESS NOTES
[facility]  Progress Note                            Date:  6/23/2018  Patient: Jairo Irving  Admission:  6/22/2018 11:02 AM  Admit DX: SVT (supraventricular tachycardia) ([facility] [I47.1]  Age:  81 y.o., 1945     LOS: 1 day     Reason for evaluation:   arrhythmia      SUBJECTIVE:    The patient was seen and examined. Notes and labs reviewed. There Were no complications over night. Patient's cardiac review of systems: negative. The patient is generally feeling improved. Remains in atrial flutter but rate is controlled      OBJECTIVE:    Telemetry: atrial flutter  BP (!) 146/52   Pulse 65   Temp 98.7 °F (37.1 °C) (Temporal)   Resp 16   Ht 6' 1\" (1.854 m)   Wt 179 lb (81.2 kg)   SpO2 98%   BMI 23.62 kg/m²     Intake/Output Summary (Last 24 hours) at 06/23/18 1411  Last data filed at 06/23/18 0800   Gross per 24 hour   Intake            788.4 ml   Output                0 ml   Net            788.4 ml       Labs:   CBC:   Recent Labs      06/22/18   0515  06/23/18   0619   WBC  19.6*  17.2*   HGB  8.2*  8.3*   HCT  25.7*  26.9*   PLT  426*  498*     BMP: Recent Labs      06/22/18   0515  06/23/18   0619   NA  134*  137   K  4.0  4.0   CO2  23  23   BUN  50*  43*   CREATININE  7.0*  6.7*   LABGLOM  8*  8*   GLUCOSE  111*  132*     BNP: No results for input(s): BNP in the last 72 hours. PT/INR:   Recent Labs      06/21/18   0537  06/22/18   0515   PROTIME  14.7*  14.8*   INR  1.16  1.17     APTT:No results for input(s): APTT in the last 72 hours. CARDIAC ENZYMES:No results for input(s): CKTOTAL, CKMB, CKMBINDEX, TROPONINI in the last 72 hours.   FASTING LIPID PANEL:  Lab Results   Component Value Date    HDL 45 01/23/2018    LDLDIRECT 170 01/26/2017    LDLCALC 59 01/23/2018    TRIG 153 01/23/2018     LIVER PROFILE:  Recent Labs      06/22/18   0515  06/23/18   0619   AST  42*  43*   ALT  18  19   LABALBU  2.5*  2.2*           PFSH:  No change    ROSS:  No change      Physical Examination:  BP (!) 146/52   Pulse 65   Temp 98.7 °F (37.1 °C) (Temporal)   Resp 16   Ht 6' 1\" (1.854 m)   Wt 179 lb (81.2 kg)   SpO2 98%   BMI 23.62 kg/m²     CONSTITUTIONAL:  Awake, alert, cooperative, no apparent distress, and appears stated age. HEENT: Normal jugular venous pulsations, no carotid bruits. Head is atraumatic, normocephalic. Eyes and oral mucosa are normal.  LUNGS: Respiratory effort for the work of breathing is normal.  On auscultation: lungs clear  CARDIOVASCULAR:  Normal apical impulse, regular rate and rhythm, normal S1 and S2, no S3 or S4, and no murmur or rub is noted. ABDOMEN: Soft, nontender, nondistended. Bowel sounds are present. No masses or tenderness. SKIN: Warm and dry. EXTREMITIES: no lower extremity edema. No cyanosis or clubbing. NEUROLOGY:  Motor movement grossly intact. Focal signs are not identified. Current Inpatient Medications:   vancomycin  1,000 mg Intravenous Once    vancomycin (VANCOCIN) intermittent dosing (placeholder)   Other RX Placeholder    chlorhexidine  15 mL Mouth/Throat BID    aspirin  81 mg Oral Daily    sodium chloride flush  10 mL Intravenous 2 times per day    ampicillin-sulbactam  1.5 g Intravenous Q24H    famotidine  20 mg Oral Daily    insulin lispro  0-35 Units Subcutaneous 4x Daily AC & HS    metoprolol tartrate  25 mg Oral BID    tamsulosin  0.4 mg Oral Daily    atorvastatin  20 mg Oral Daily    amiodarone bolus  150 mg Intravenous Once       IV Infusions (if any):   dextrose      amiodarone 450mg/250ml D5W infusion 0.5 mg/min (06/22/18 6887)    diltiazem (CARDIZEM) 125 mg in dextrose 5% 125 mL infusion 5 mg/hr (06/23/18 0400)       Diagnostics:    EKG: atrial flutter, unchanged from previous tracings. ECHO: reviewed. Stress Test: not obtained. Cardiac Angiography: not obtained.     ASSESSMENT:    Patient Active Problem List    Diagnosis Date Noted    SVT [facility]supraventricular tachycardia) ([facility] 06/22/2018    NORY (acute kidney injury) ([address] 06/22/2018    ATN (acute tubular necrosis) (HCC) 06/22/2018    Leukocytosis 06/22/2018    Atrial flutter with rapid ventricular response (HCC)     Weakness 06/21/2018    Gait abnormality 06/21/2018    Pain in left leg 06/21/2018    S/P AAA repair 06/20/2018    Hypocalcemia 06/15/2018    Hyperkalemia 06/12/2018    Acute renal failure (ARF) ([address] 06/12/2018    Ischemic colitis (HCC)     High serum lactate     LFTs abnormal     Anemia     Internal hemorrhoid     AAA (abdominal aortic aneurysm) ([address] 06/11/2018    Type 2 diabetes mellitus with complication, without long-term current use of insulin ([address] 06/11/2018    Acute blood loss as cause of postoperative anemia 06/11/2018    Coagulopathy ([address] 06/11/2018    Essential hypertension 03/13/2017    Tobacco use 03/13/2017    Primary osteoarthritis involving multiple joints 03/13/2017    Mixed hyperlipidemia 03/13/2017    BPH (benign prostatic hyperplasia)      Plan cardioversion today    PLAN:    1. Continue present medications  2. As noted above. Please see orders. Discussed with patient and nursing.     Mendel Munoz MD     [facility]

## 2018-06-23 NOTE — PROGRESS NOTES
Progress Note    Admission Problem List: Active Hospital Problems    Diagnosis Date Noted    SVT (supraventricular tachycardia) ([facility] [I47.1] 06/22/2018     Priority: High    Atrial flutter with rapid ventricular response (HCC) [I48.92]      Priority: High    [facility] [N17.9] 06/22/2018     Priority: Medium    ATN (acute tubular necrosis) ([facility] [N17.0] 06/22/2018     Priority: Medium    AAA (abdominal aortic aneurysm) ([facility] [I71.4] 06/11/2018     Priority: Medium    Type 2 diabetes mellitus with complication, without long-term current use of insulin ([facility] [E11.8] 06/11/2018     Priority: Medium    Essential hypertension [I10] 03/13/2017     Priority: Medium    Mixed hyperlipidemia [E78.2] 03/13/2017     Priority: Low    BPH (benign prostatic hyperplasia) [N40.0]      Priority: Low    Leukocytosis [D72.829] 06/22/2018    Pain in left leg [M79.605] 06/21/2018       Admit Date:  6/22/2018    Subjective:  Mr. Demetri Hsu is currently resting in bed, just finished getting bed bath. Denies any abdominal pain, shortness of breath, chest pain, or palpitations. Objective: Intake/Output Summary (Last 24 hours) at 06/23/18 0831  Last data filed at 06/23/18 0500   Gross per 24 hour   Intake            958.4 ml   Output                0 ml   Net            958.4 ml       TELEMETRY: Atrial fibrillation - flutter, rate controlled    Physical Exam:  Vital Signs: BP (!) 150/61   Pulse 72   Temp 98.3 °F (36.8 °C) (Temporal)   Resp 17   Ht 6' 1\" (1.854 m)   Wt 179 lb (81.2 kg)   SpO2 95%   BMI 23.62 kg/m²     General:  Awake, alert, NAD  Skin:  Warm and dry  Neck:  Supple, No JVD  Chest:  Clear to auscultation, without wheezes or rhonchi. Respirations even, unlabored  Cardiovascular:  Normal HT with irregular rate and rhythm. No murmurs, gallops or rubs  Abdomen:  Soft, non-tender with active bowel sounds.  Multiple liquid bowel movements - negative for c-diff  Extremities:  Left foot warm to touch with cyanotic, necrotic toes. Pedal pulses doppled. Right lower extremity without clubbing or cyanosis, no edema, pedal pulses palpated    Lab Data:  CBC: Recent Labs      06/21/18   0537  06/22/18   0515  06/23/18   0619   WBC  17.0*  19.6*  17.2*   HGB  8.0*  8.2*  8.3*   HCT  24.6*  25.7*  26.9*   MCV  93.5  93.1  94.4*   PLT  313  426*  498*     BMP: Recent Labs      06/21/18   0536  06/22/18   0515  06/23/18   0619   NA  132*  134*  137   K  4.2  4.0  4.0   CL  94*  94*  98   CO2  23  23  23   BUN  56*  50*  43*   CREATININE  7.5*  7.0*  6.7*     LIVER PROFILE:   Recent Labs      06/21/18   0536  06/22/18   0515  06/23/18   0619   AST  56*  42*  43*   ALT  20  18  19   BILITOT  0.3  0.3  0.3   ALKPHOS  78  83  90     PT/INR:   Recent Labs      06/21/18   0537  06/22/18   0515   PROTIME  14.7*  14.8*   INR  1.16  1.17     2D Echocardiogram: Poor Quality      Assessment:       ALL THE CARDIOLOGY PROBLEMS ARE LISTED ABOVE; HOWEVER, THE FOLLOWING SPECIFIC CARDIAC PROBLEMS WERE ADDRESSED AND TREATED DURING THE HOSPITAL VISIT TODAY:          Cardiac Specific Problem / Diagnosis   Discussion / Medical Decision Making Plan               1. Atrial flutter with rapid ventricular response  improved    Continue IV Amiodarone and Cardizem drips. Received total of 750 mcg of lanoxin  He continued in atrial fibrillation/flutter, but rate is now controlled. Will keep NPO until seen by Dr. Shahbaz Tripp for possible DCCV               2. End stage renal disease      As per attending and nephrology                 3.                        Plan:  1. Continue current medications - Amiodarone and Cardizem  2.  NPO for Possible [facility], APRN-BC  6/23/2018 8:31 AM

## 2018-06-23 NOTE — CONSULTS
 Hemodialysis patient ([facility]     History of blood transfusion     Hypertension     Osteoarthritis        Past Surgical History:  Past Surgical History:   Procedure Laterality Date    ABDOMEN SURGERY      APPENDECTOMY      BACK SURGERY      COLONOSCOPY      HC DIALYSIS CATHETER  6/12/2018    CATHETER INSERTION HEMODIALYSIS performed by Tj Eaton MD at [address] [facility] Left     JOINT REPLACEMENT      MASTOID SURGERY Left     NECK SURGERY      PA EXPLORATORY OF ABDOMEN N/A 6/12/2018    LAPAROTOMY EXPLORATORY performed by Ki King MD at [facility] ANEURYSM/GRFT INS,ABD AORT W/VISCER N/A 6/11/2018    OPEN REPAIR OF ABDOMINAL AORTIC ANEURYSM REPAIR WITH SDSTY-FU-NTATO BYPASS WITH CELL SAVER performed by Tj Eaton MD at [facility] FLX DX W/COLLJ SPEC BR/WA IF PFRMD N/A 6/12/2018    Dr Carpio-Likely ischemic colitis, internal hemorrhoids    TONSILLECTOMY      VASCULAR SURGERY  06/11/2018    DJM. AAA/lliac aneurysm repair, R fem EA/bypass    VASCULAR SURGERY  06/11/2018    DJM. Repair of abdominal aortic aneurysm and bilateral iliac aneurysm with aorta left iliac on the right and right femoral on the left with right common femoral endarterectomy with 18x9 gore-juliocesar bifurcated graft. Home Medications:   Prior to Admission medications    Medication Sig Start Date End Date Taking? Authorizing Provider   Multiple Vitamins-Minerals (MULTIVITAMIN ADULTS 50+) TABS Take by mouth daily    Historical Provider, MD   carisoprodol (SOMA) 250 MG tablet Take 350 mg by mouth 4 times daily as needed. Marlo Mckeon     Historical Provider, MD   S[place], Serenoa repens, (SAW PALMETTO PO) Take by mouth daily     Historical Provider, MD   melatonin 3 MG TABS tablet Take 3 mg by mouth nightly     Historical Provider, MD   Omega-3 Fatty Acids (OMEGA-3 CF PO) Take by mouth daily     Historical Provider, MD   meloxicam (MOBIC) 15 MG tablet Take 1 tablet by mouth daily 3/16/18   MATTHEW Díaz DO [place]   tamsulosin (FLOMAX) 0.4 MG capsule TAKE 1 CAPSULE BY MOUTH EVERY DAY 3/16/18   MATTHEW Freeman DO   atorvastatin (LIPITOR) 20 MG tablet TAKE 1 TABLET BY MOUTH EVERY DAY 3/16/18   MATTHEW Freeman DO   oxyCODONE-acetaminophen (PERCOCET) 5-325 MG per tablet Take 1 tablet by mouth as needed for Pain . Historical Provider, MD        Facility Administered Medications:    chlorhexidine  15 mL Mouth/Throat BID    [START ON 6/23/2018] aspirin  81 mg Oral Daily    sodium chloride flush  10 mL Intravenous 2 times per day    ampicillin-sulbactam  1.5 g Intravenous Q24H    [START ON 6/23/2018] famotidine  20 mg Oral Daily    insulin lispro  0-35 Units Subcutaneous 4x Daily AC & HS    metoprolol tartrate  25 mg Oral BID    tamsulosin  0.4 mg Oral Daily    atorvastatin  20 mg Oral Daily    amiodarone bolus  150 mg Intravenous Once    diltiazem  10 mg Intravenous Once       Allergies:    Patient has no known allergies. Social History:    Social History     Social History    Marital status: Single     Spouse name: N/A    Number of children: N/A    Years of education: N/A     Occupational History    Not on file. Social History Main Topics    Smoking status: Current Every Day Smoker     Packs/day: 0.50     Years: 50.00     Types: Cigarettes    Smokeless tobacco: Never Used    Alcohol use Yes      Comment: 1 beer per year    Drug use: No    Sexual activity: Not Currently     Other Topics Concern    Not on file     Social History Narrative    No narrative on file       Family History:  Family History   Problem Relation Age of Onset    Other Mother     Cancer Father          REVIEW OF SYSTEMS:     Except as noted in the HPI, all other systems are negative  CONSTITUTIONAL:  No fevers, chills, night sweats, or weight loss. HEENT:  No vision loss, double vision, blurred vision, or tearing. No hearing loss, tinnitus, or infection. No nasal discharge or epistaxis.   No percussion. ABDOMEN:  Soft, non tender. Bowel sounds X4 quadrants. No hepatomegaly, splenomegaly or palpable mass. MUSCULOSKELETAL:  Negative. UPPER EXTREMITIES:  Radial pulses palpable bilaterally. No cyanosis, clubbing, or edema. LOWER EXTREMITIES:  Femoral, popliteal, dorsalis pedis, and posterior tibial pulses 2+ to palpation bilaterally. No cyanosis, clubbing, or edema or signs of atheroembolic event. NEUROLOGIC:  Physiologic. SKIN:  Warm, dry, intact. LABORATORY EVALUATION & TESTING:    I have personally reviewed and interpreted the results of the following diagnostic testing      EKG and or Telemetry:  which was personally reviewed me:  Atrial flutter rhythm, 150 bpm    Troponin:  negative for myocardial necrosis    CBC:   Recent Labs      06/20/18   0543  06/21/18   0537  06/22/18   0515   WBC  17.7*  17.0*  19.6*   HGB  8.9*  8.0*  8.2*   HCT  27.9*  24.6*  25.7*   MCV  95.2*  93.5  93.1   PLT  271  313  426*     BMP:   Recent Labs      06/20/18   0543  06/21/18   0536  06/22/18   0515   NA  135*  132*  134*   K  4.1  4.2  4.0   CL  98  94*  94*   CO2  24  23  23   BUN  44*  56*  50*   CREATININE  6.0*  7.5*  7.0*     Cardiac Enzymes: No results for input(s): CKTOTAL, CKMB, CKMBINDEX, TROPONINI in the last 72 hours. PT/INR:   Recent Labs      06/20/18   0543  06/21/18   0537  06/22/18   0515   PROTIME  15.2*  14.7*  14.8*   INR  1.21*  1.16  1.17     APTT: No results for input(s): APTT in the last 72 hours.   Liver Profile:  Lab Results   Component Value Date    AST 42 06/22/2018    ALT 18 06/22/2018    BILITOT 0.3 06/22/2018    ALKPHOS 83 06/22/2018     Lab Results   Component Value Date    CHOL 135 01/23/2018    HDL 45 01/23/2018    TRIG 153 01/23/2018     TSH:  Lab Results   Component Value Date    TSH 5.080 06/22/2018     UA: No results found for: NITRITE, COLORU, PHUR, LABCAST, WBCUA, RBCUA, MUCUS, TRICHOMONAS, YEAST, BACTERIA, CLARITYU, SPECGRAV, LEUKOCYTESUR, [address] [place], BILIRUBINUR,

## 2018-06-23 NOTE — PROGRESS NOTES
0.1 mg Oral Q2H PRN Rachael Estrada MD        HYDROcodone-acetaminophen (NORCO) 5-325 MG per tablet 1 tablet  1 tablet Oral Q4H PRN Rachael Estrada MD        Or    HYDROcodone-acetaminophen (NORCO) 5-325 MG per tablet 2 tablet  2 tablet Oral Q4H PRN Rachael Estrada MD   2 tablet at 06/22/18 2022    morphine (PF) injection 2 mg  2 mg Intravenous Q3H PRN Rachael Estrada MD        sodium chloride flush 0.9 % injection 10 mL  10 mL Intravenous 2 times per day Rachael Estrada MD   10 mL at 06/22/18 1241    sodium chloride flush 0.9 % injection 10 mL  10 mL Intravenous PRN Rachael Estrada MD   10 mL at 06/22/18 1554    ampicillin-sulbactam (UNASYN) 1.5 g IVPB minibag  1.5 g Intravenous Q24H Rachael Estrada MD   Stopped at 06/22/18 1929    famotidine (PEPCID) tablet 20 mg  20 mg Oral Daily Rachael Estrada MD        heparin flush 100 UNIT/ML injection 200 Units  200 Units Intercatheter PRN Rachael Estrada MD        hydrALAZINE (APRESOLINE) injection 10 mg  10 mg Intravenous Q6H PRN Rachael Estrada MD        insulin lispro (HUMALOG) injection vial 0-35 Units  0-35 Units Subcutaneous 4x Daily AC & HS Rachael Estrada MD        LORazepam (ATIVAN) injection 1 mg  1 mg Intravenous Q4H PRN Rachael Estrada MD        metoprolol tartrate (LOPRESSOR) tablet 25 mg  25 mg Oral BID Rachael Estrada MD   25 mg at 06/22/18 2022    ipratropium-albuterol (DUONEB) nebulizer solution 1 ampule  1 ampule Inhalation Q4H PRN DO Vikas Rodrigezulosin [facility]) capsule 0.4 mg  0.4 mg Oral Daily Alvaro Covington PA-C   0.4 mg at 06/22/18 1310    atorvastatin (LIPITOR) tablet 20 mg  20 mg Oral Daily NANY Jasmine-C   20 mg at 06/22/18 1310    amiodarone (CORDARONE) 150 mg in dextrose 5 % 100 mL bolus  150 mg Intravenous Once JESSICA Baker MD        Followed by   [place] amiodarone (CORDARONE) 450 mg in dextrose 5 % 250 mL infusion  0.5 mg/min Intravenous Continuous JESSICA Baker MD 16.7 mL/hr at 06/22/18 2639 0.5 mg/min at 06/22/18 2354    diltiazem 125 mg in dextrose 5 % 125 mL infusion  5 mg/hr Intravenous Continuous JESSICA Baker MD 5 mL/hr at 06/23/18 0400 5 mg/hr at 06/23/18 0400        Labs:     Recent Labs      06/21/18   0537  06/22/18   0515  06/23/18   0619   WBC  17.0*  19.6*  17.2*   RBC  2.63*  2.76*  2.85*   HGB  8.0*  8.2*  8.3*   HCT  24.6*  25.7*  26.9*   MCV  93.5  93.1  94.4*   MCH  30.4  29.7  29.1   MCHC  32.5*  31.9*  30.9*   PLT  313  426*  498*     Recent Labs      06/21/18   0536  06/22/18   0515  06/23/18   0619   NA  132*  134*  137   K  4.2  4.0  4.0   ANIONGAP  15  17  16   CL  94*  94*  98   CO2  23 23 23   BUN  56*  50*  43*   CREATININE  7.5*  7.0*  6.7*   GLUCOSE  123*  111*  132*   CALCIUM  7.3*  7.9*  7.8*     Recent Labs      06/22/18   0555   MG  2.4     Recent Labs      06/21/18   0536  06/22/18   0515  06/23/18   0619   AST  56*  42*  43*   ALT  20 18  19   BILITOT  0.3  0.3  0.3   ALKPHOS  78  83  90     ABGs:No results for input(s): PHART, QSP5JKL, PO2ART, ICD5APN, BEART, HGBAE, O6BZEECO, CARBOXHGBART, 02THERAPY in the last 72 hours. HgBA1c: No results for input(s): LABA1C in the last 72 hours. FLP:  Lab Results   Component Value Date    TRIG 153 01/23/2018    HDL 45 01/23/2018    LDLCALC 59 01/23/2018    LDLDIRECT 170 01/26/2017     TSH:    Lab Results   Component Value Date    TSH 5.080 06/22/2018     Troponin T: No results for input(s): TROPONINI in the last 72 hours.   INR:   Recent Labs      06/21/18   0537  06/22/18   0515   INR  1.16  1.17       Objective:   Vitals: BP (!) 150/61   Pulse 72   Temp 98.3 °F (36.8 °C) (Temporal)   Resp 17   Ht 6' 1\" (1.854 m)   Wt 179 lb (81.2 kg)   SpO2 95%   BMI 23.62 kg/m²   24HR INTAKE/OUTPUT:    Intake/Output Summary (Last 24 hours) at 06/23/18 0848  Last data filed at 06/23/18 0500   Gross per 24 hour   Intake            958.4 ml   Output                0 ml   Net            958.4 ml     General appearance: NAD, alert and cooperative with exam  HEENT: atraumatic, PERRLA, EOMI, anicteric, trachea midline  Lungs: no increased work of breathing, diminished at bases  Heart: tachy, +s1/s2, no rub  Abdomen: soft, nt/nd, +BS, no rebound/gaurding  Extremities: no edema,full ROM  Neurologic: AAOx3, no focal deficits  Skin: purple discoloration of L foot, L lateral thigh wound stable  Psych: Normal affect      Assessment and Plan:   Principal Problem:    SVT (supraventricular tachycardia)/aflutter w/ RVR - pt was tachy in HD, appeared to be SVT and more specifically after adenosine showed to be a flutter. Has been persistent despite amiodarone bolus/gtt and cardizem gtt. Plan is for cardioversion today. About 24 hour duration to this point, has not been on [facility], defer to cardiology    Active Problems:    BPH (benign prostatic hyperplasia) - on flomax      Essential hypertension - bp stable, adjusting meds for flutter      Mixed hyperlipidemia - on statin      AAA (abdominal aortic aneurysm) ([address] - s/p repair 6/11 by Dr. Jt Em      Type 2 diabetes mellitus with complication, without long-term current use of insulin ([address] - on ssi and doing well      Pain in left leg - improving, vascular following, wound stable      ESRD - on HD, outpatient chair arranged already from my understanding, will f/u      Leukocytosis - improved from 19 to 17 today, monitor, likely PNA      HAP - pt w/ fever several days ago, more sputum, CXR questions RLL PNA.  Already on unasyn as we suspect aspiration, will add vanco as well given hospital stay          Advance Directive: Full Code    Pt does not want to return to 8th floor, wants to be discharged home when stable      Electronically signed by Kenton Schultz DO on 6/23/2018 at 8:48 AM

## 2018-06-23 NOTE — CONSULTS
PRN, Radha Lala MD  chlorhexidine (PERIDEX) 0.12 % solution 15 mL, 15 mL, Mouth/Throat, BID, Radha Lala MD  dextrose 5 % solution, 100 mL/hr, Intravenous, PRN, Radha Lala MD  dextrose 50 % solution 12.5 g, 12.5 g, Intravenous, PRN, Radha Lala MD  glucagon (rDNA) injection 1 mg, 1 mg, Intramuscular, PRN, Radha Lala MD  glucose (GLUTOSE) 40 % oral gel 15 g, 15 g, Oral, PRN, Radha Lala MD  acetaminophen (TYLENOL) tablet 650 mg, 650 mg, Oral, Q4H PRN, Radha Lala MD  bisacodyl (DULCOLAX) suppository 10 mg, 10 mg, Rectal, Daily PRN, Radha Lala MD  docusate sodium (COLACE) capsule 100 mg, 100 mg, Oral, BID PRN, Radha Lala MD  magnesium hydroxide (MILK OF MAGNESIA) 400 MG/5ML suspension 30 mL, 30 mL, Oral, Daily PRN, Radha Lala MD  aspirin EC tablet 81 mg, 81 mg, Oral, Daily, Radha Lala MD, 81 mg at 06/23/18 1024  cloNIDine (CATAPRES) tablet 0.1 mg, 0.1 mg, Oral, Q2H PRN, Radha Lala MD   HYDROcodone-acetaminophen (NORCO) 5-325 MG per tablet 1 tablet, 1 tablet, Oral, Q4H PRN, Radha Lala MD    Or  HYDROcodone-acetaminophen (NORCO) 5-325 MG per tablet 2 tablet, 2 tablet, Oral, Q4H PRN, Radha Lala MD, 2 tablet at 06/22/18 2022  morphine (PF) injection 2 mg, 2 mg, Intravenous, Q3H PRN, Radha Lala MD  sodium chloride flush 0.9 % injection 10 mL, 10 mL, Intravenous, 2 times per day, Radha Lala MD, 10 mL at 06/23/18 0800  sodium chloride flush 0.9 % injection 10 mL, 10 mL, Intravenous, PRN, Radha Lala MD, 10 mL at 06/22/18 1554  ampicillin-sulbactam (UNASYN) 1.5 g IVPB minibag, 1.5 g, Intravenous, Q24H, Kishore Lambert DO, Stopped at 06/22/18 1929  famotidine (PEPCID) tablet 20 mg, 20 mg, Oral, Daily, Radha Lala MD, 20 mg at 06/23/18 1025  heparin flush 100 UNIT/ML injection 200 Units, 200 Units, Intercatheter, PRN, Radha Lala MD  hydrALAZINE (APRESOLINE) injection 10 mg, 10 mg, Intravenous, Q6H PRN, Radha Lala MD  insulin lispro (HUMALOG) injection EXPLORATORY performed by Andrew Hennessy MD at [facility]  6/11/2018: MA REPR ANEURYSM/GRFT INS,ABD AORT W/VISCER N/A      Comment: OPEN REPAIR OF ABDOMINAL AORTIC ANEURYSM                REPAIR WITH [id number] BYPASS WITH CELL                SAVER performed by Pablito Robert MD at [facility]  6/12/2018: MA SIGMOIDOSCOPY FLX DX W/COLLJ SPEC BR/WA IF * N/A      Comment: Dr Carpio-Likely ischemic colitis, internal                hemorrhoids  No date: TONSILLECTOMY  06/11/2018: VASCULAR SURGERY      Comment: DJM. AAA/lliac aneurysm repair, R fem EA/bypass  06/11/2018: VASCULAR SURGERY      Comment: KATHERINE. Repair of abdominal aortic aneurysm and                bilateral iliac aneurysm with aorta left iliac                on the right and right femoral on the left with               right common femoral endarterectomy with 18x9                gore-juliocesar bifurcated graft. Family History  Review of patient's family history indicates: Other                          Mother                    Cancer                         Father                      Social History  Social History    Marital status: Single              Spouse name:                       Years of education:                 Number of children:               Occupational History    None on file    Social History Main Topics    Smoking status: Current Every Day Smoker                                                     Packs/day: 0.50      Years: 50.00          Types: Cigarettes    Smokeless tobacco: Never Used                        Alcohol use: Yes                Comment: 1 beer per year    Drug use:  No              Sexual activity: Not Currently        Other Topics            Concern    None on file    Social History Narrative    None on file          Review of Systems:  History obtained from chart review and the patient  General ROS: No fever or chills  Respiratory ROS: No cough, shortness of breath, wheezing  Cardiovascular ROS: positive for - rapid heart rate  Gastrointestinal ROS: No abdominal pain or melena  Genito-Urinary ROS: No dysuria or hematuria  Musculoskeletal ROS: No joint pain or swelling   14 point ROS reviewed with the patient and negative except as noted above and in the HPI unless unable to obtain. Objective:  Blood pressure (!) 142/55, pulse 74, temperature 98.3 °F (36.8 °C), temperature source Temporal, resp. rate 21, height 6' 1\" (1.854 m), weight 179 lb (81.2 kg), SpO2 95 %. Intake/Output Summary (Last 24 hours) at 06/23/18 1051  Last data filed at 06/23/18 0800          Gross per 24 hour  Intake:            968.4 ml  Output:                0 ml  Net   :            968.4 ml  General: awake/alert   HEENT: Normal cephalic atraumatic head  Neck: Supple with no JVD. Chest:  clear to auscultation bilaterally without respiratory distress  CVS: Irregularly irregular S1 and S2.   Abdominal: soft, nontender, normal bowel sounds  Extremities: no cyanosis or edema  Skin: warm and dry without rash      Labs:  BMP:                 06/21/18 06/22/18 06/23/18                       0536          0515          0619          NA           132*         134*         137           K            4.2          4.0          4.0           CL           94*          94*          98            CO2          23           23           23            BUN          56*          50*          43*           CREATININE   7.5*         7.0*         6.7*          CALCIUM      7.3*         7.9*         7.8*          CBC:                 06/21/18 06/22/18 06/23/18                       0537          0515          0619          WBC          17.0*        19.6*        17.2*         HGB          8.0*         8.2*         8.3*          HCT          24.6*        25.7*        26.9*         MCV          93.5         93.1         94.4*         PLT          313          426*         498*          LIVER PROFILE:                 06/21/18 06/22/18 06/23/18

## 2018-06-24 LAB
ALBUMIN SERPL-MCNC: 2.2 G/DL (ref 3.5–5.2)
ALP BLD-CCNC: 77 U/L (ref 40–130)
ALT SERPL-CCNC: 18 U/L (ref 5–41)
ANION GAP SERPL CALCULATED.3IONS-SCNC: 16 MMOL/L (ref 7–19)
AST SERPL-CCNC: 31 U/L (ref 5–40)
BASOPHILS ABSOLUTE: 0 K/UL (ref 0–0.2)
BASOPHILS RELATIVE PERCENT: 0.2 % (ref 0–1)
BILIRUB SERPL-MCNC: 0.4 MG/DL (ref 0.2–1.2)
BUN BLDV-MCNC: 48 MG/DL (ref 8–23)
CALCIUM SERPL-MCNC: 7.6 MG/DL (ref 8.8–10.2)
CHLORIDE BLD-SCNC: 95 MMOL/L (ref 98–111)
CO2: 22 MMOL/L (ref 22–29)
CREAT SERPL-MCNC: 7.8 MG/DL (ref 0.5–1.2)
EKG P AXIS: 59 DEGREES
EKG P AXIS: NORMAL DEGREES
EKG P AXIS: NORMAL DEGREES
EKG P-R INTERVAL: 172 MS
EKG P-R INTERVAL: NORMAL MS
EKG P-R INTERVAL: NORMAL MS
EKG Q-T INTERVAL: 266 MS
EKG Q-T INTERVAL: 394 MS
EKG Q-T INTERVAL: 422 MS
EKG QRS DURATION: 100 MS
EKG QRS DURATION: 106 MS
EKG QRS DURATION: 106 MS
EKG QTC CALCULATION (BAZETT): 413 MS
EKG QTC CALCULATION (BAZETT): 419 MS
EKG QTC CALCULATION (BAZETT): 434 MS
EKG T AXIS: -46 DEGREES
EKG T AXIS: -69 DEGREES
EKG T AXIS: 65 DEGREES
EOSINOPHILS ABSOLUTE: 0.3 K/UL (ref 0–0.6)
EOSINOPHILS RELATIVE PERCENT: 2 % (ref 0–5)
GFR NON-AFRICAN AMERICAN: 7
GLUCOSE BLD-MCNC: 109 MG/DL (ref 70–99)
GLUCOSE BLD-MCNC: 117 MG/DL (ref 74–109)
GLUCOSE BLD-MCNC: 118 MG/DL (ref 70–99)
GLUCOSE BLD-MCNC: 140 MG/DL (ref 70–99)
GLUCOSE BLD-MCNC: 168 MG/DL (ref 70–99)
HCT VFR BLD CALC: 25.2 % (ref 42–52)
HEMOGLOBIN: 7.9 G/DL (ref 14–18)
LYMPHOCYTES ABSOLUTE: 1.7 K/UL (ref 1.1–4.5)
LYMPHOCYTES RELATIVE PERCENT: 11 % (ref 20–40)
MAGNESIUM: 2.3 MG/DL (ref 1.6–2.4)
MCH RBC QN AUTO: 29.6 PG (ref 27–31)
MCHC RBC AUTO-ENTMCNC: 31.3 G/DL (ref 33–37)
MCV RBC AUTO: 94.4 FL (ref 80–94)
MONOCYTES ABSOLUTE: 1.5 K/UL (ref 0–0.9)
MONOCYTES RELATIVE PERCENT: 9.9 % (ref 0–10)
NEUTROPHILS ABSOLUTE: 11.5 K/UL (ref 1.5–7.5)
NEUTROPHILS RELATIVE PERCENT: 74 % (ref 50–65)
PDW BLD-RTO: 15 % (ref 11.5–14.5)
PERFORMED ON: ABNORMAL
PLATELET # BLD: 538 K/UL (ref 130–400)
PMV BLD AUTO: 10.5 FL (ref 9.4–12.4)
POTASSIUM SERPL-SCNC: 4.4 MMOL/L (ref 3.5–5)
RBC # BLD: 2.67 M/UL (ref 4.7–6.1)
SODIUM BLD-SCNC: 133 MMOL/L (ref 136–145)
TOTAL PROTEIN: 5 G/DL (ref 6.6–8.7)
VANCOMYCIN RANDOM: 11.3 UG/ML
WBC # BLD: 15.5 K/UL (ref 4.8–10.8)

## 2018-06-24 PROCEDURE — 6370000000 HC RX 637 (ALT 250 FOR IP): Performed by: PSYCHIATRY & NEUROLOGY

## 2018-06-24 PROCEDURE — 6370000000 HC RX 637 (ALT 250 FOR IP): Performed by: INTERNAL MEDICINE

## 2018-06-24 PROCEDURE — 36415 COLL VENOUS BLD VENIPUNCTURE: CPT

## 2018-06-24 PROCEDURE — 99232 SBSQ HOSP IP/OBS MODERATE 35: CPT | Performed by: INTERNAL MEDICINE

## 2018-06-24 PROCEDURE — 2580000003 HC RX 258: Performed by: INTERNAL MEDICINE

## 2018-06-24 PROCEDURE — 2580000003 HC RX 258: Performed by: PSYCHIATRY & NEUROLOGY

## 2018-06-24 PROCEDURE — 6360000002 HC RX W HCPCS: Performed by: INTERNAL MEDICINE

## 2018-06-24 PROCEDURE — 80202 ASSAY OF VANCOMYCIN: CPT

## 2018-06-24 PROCEDURE — 6360000002 HC RX W HCPCS: Performed by: PSYCHIATRY & NEUROLOGY

## 2018-06-24 PROCEDURE — 85025 COMPLETE CBC W/AUTO DIFF WBC: CPT

## 2018-06-24 PROCEDURE — 94640 AIRWAY INHALATION TREATMENT: CPT

## 2018-06-24 PROCEDURE — 82948 REAGENT STRIP/BLOOD GLUCOSE: CPT

## 2018-06-24 PROCEDURE — 2100000000 HC CCU R&B

## 2018-06-24 PROCEDURE — 80053 COMPREHEN METABOLIC PANEL: CPT

## 2018-06-24 PROCEDURE — 6370000000 HC RX 637 (ALT 250 FOR IP): Performed by: PHYSICIAN ASSISTANT

## 2018-06-24 PROCEDURE — 83735 ASSAY OF MAGNESIUM: CPT

## 2018-06-24 RX ORDER — MORPHINE SULFATE 1 MG/ML
INJECTION, SOLUTION EPIDURAL; INTRATHECAL; INTRAVENOUS
Status: DISPENSED
Start: 2018-06-24 | End: 2018-06-24

## 2018-06-24 RX ORDER — AMLODIPINE BESYLATE 10 MG/1
10 TABLET ORAL DAILY
Status: DISCONTINUED | OUTPATIENT
Start: 2018-06-24 | End: 2018-06-29 | Stop reason: HOSPADM

## 2018-06-24 RX ORDER — AMIODARONE HYDROCHLORIDE 200 MG/1
200 TABLET ORAL 2 TIMES DAILY
Status: DISCONTINUED | OUTPATIENT
Start: 2018-06-24 | End: 2018-06-25 | Stop reason: DRUGHIGH

## 2018-06-24 RX ORDER — LOPERAMIDE HYDROCHLORIDE 2 MG/1
2 CAPSULE ORAL 4 TIMES DAILY PRN
Status: DISCONTINUED | OUTPATIENT
Start: 2018-06-24 | End: 2018-06-29 | Stop reason: HOSPADM

## 2018-06-24 RX ORDER — VANCOMYCIN HYDROCHLORIDE 1 G/200ML
1000 INJECTION, SOLUTION INTRAVENOUS ONCE
Status: COMPLETED | OUTPATIENT
Start: 2018-06-24 | End: 2018-06-24

## 2018-06-24 RX ADMIN — LOPERAMIDE HYDROCHLORIDE 2 MG: 2 CAPSULE ORAL at 15:14

## 2018-06-24 RX ADMIN — Medication 2 MG: at 23:22

## 2018-06-24 RX ADMIN — SODIUM CHLORIDE 1.5 G: 900 INJECTION INTRAVENOUS at 17:42

## 2018-06-24 RX ADMIN — AMIODARONE HYDROCHLORIDE 200 MG: 200 TABLET ORAL at 21:15

## 2018-06-24 RX ADMIN — IPRATROPIUM BROMIDE AND ALBUTEROL SULFATE 1 AMPULE: .5; 3 SOLUTION RESPIRATORY (INHALATION) at 23:32

## 2018-06-24 RX ADMIN — HYDROCODONE BITARTRATE AND ACETAMINOPHEN 2 TABLET: 5; 325 TABLET ORAL at 21:16

## 2018-06-24 RX ADMIN — ASPIRIN 81 MG: 81 TABLET, COATED ORAL at 09:17

## 2018-06-24 RX ADMIN — Medication 10 ML: at 09:18

## 2018-06-24 RX ADMIN — ATORVASTATIN CALCIUM 20 MG: 20 TABLET, FILM COATED ORAL at 09:17

## 2018-06-24 RX ADMIN — VANCOMYCIN HYDROCHLORIDE 1000 MG: 1 INJECTION, SOLUTION INTRAVENOUS at 17:30

## 2018-06-24 RX ADMIN — AMLODIPINE BESYLATE 10 MG: 10 TABLET ORAL at 17:41

## 2018-06-24 RX ADMIN — Medication 10 ML: at 21:21

## 2018-06-24 RX ADMIN — FAMOTIDINE 20 MG: 20 TABLET ORAL at 09:16

## 2018-06-24 RX ADMIN — AMIODARONE HYDROCHLORIDE 0.5 MG/MIN: 50 INJECTION, SOLUTION INTRAVENOUS at 11:35

## 2018-06-24 RX ADMIN — CHLORHEXIDINE GLUCONATE 15 ML: 1.2 RINSE ORAL at 09:00

## 2018-06-24 RX ADMIN — TAMSULOSIN HYDROCHLORIDE 0.4 MG: 0.4 CAPSULE ORAL at 09:16

## 2018-06-24 RX ADMIN — PROCHLORPERAZINE EDISYLATE 10 MG: 5 INJECTION INTRAMUSCULAR; INTRAVENOUS at 23:22

## 2018-06-24 RX ADMIN — AMIODARONE HYDROCHLORIDE 200 MG: 200 TABLET ORAL at 17:40

## 2018-06-24 RX ADMIN — METOPROLOL TARTRATE 25 MG: 25 TABLET ORAL at 21:16

## 2018-06-24 RX ADMIN — LOPERAMIDE HYDROCHLORIDE 2 MG: 2 CAPSULE ORAL at 11:07

## 2018-06-24 RX ADMIN — METOPROLOL TARTRATE 25 MG: 25 TABLET ORAL at 09:17

## 2018-06-24 ASSESSMENT — PAIN SCALES - GENERAL
PAINLEVEL_OUTOF10: 4
PAINLEVEL_OUTOF10: 0
PAINLEVEL_OUTOF10: 6
PAINLEVEL_OUTOF10: 7
PAINLEVEL_OUTOF10: 0

## 2018-06-24 NOTE — PROGRESS NOTES
Nephrology ([facility]) Progress Note      Patient:  Lis Hester  YOB: 1945  Date of Service: 6/24/2018  MRN: [id number]   Acct: [de-identified]   Primary Care Physician: Raysa Pizarro DO  Advance Directive: Full Code  Admit Date: 6/22/2018       Hospital Day: 2  Referring Provider: Samy Wilcox DO    Patient independently seen and examined, Chart, Consults, Notes, Operative notes, Labs, Cardiology, and Radiology studies reviewed as able. Subjective:  Lis Hestre is a 81 y.o. male  whom we were consulted for acute kidney injury/hemodialysis dependent. Patient was initially admitted to [facility] and underwent open abdominal aortic aneurysm repair. Procedure was complicated by blood loss needing blood transfusion as well as vasopressor. He has suffered acute kidney injury, needing dialysis. Patient also had ischemic colitis needing partial colon resection. Patient was transferred to rehab floor and was readmitted to CCU as he had one episode of atrial fibrillation during dialysis. Patient was initially treated with IV amiodarone. On June 23, he underwent cardioversion and converted to normal sinus rhythm. This afternoon he is feeling much better. Allergies:  Patient has no known allergies. Medicines:  Current Facility-Administered Medications   Medication Dose Route Frequency Provider Last Rate Last Dose    morphine (PF) 1 MG/ML injection             loperamide (IMODIUM) capsule 2 mg  2 mg Oral 4x Daily PRN Samy Wilcox DO   2 mg at 06/24/18 1107    amiodarone (CORDARONE) tablet 200 mg  200 mg Oral BID JESSICA Foote MD        vancomycin [facility]) intermittent dosing (placeholder)   Other RX Placeholder Katharine Lambert DO        heparin (porcine) injection 2,000 Units  2,000 Units Intercatheter PRN Sary Adhikari MD        chlorhexidine (PERIDEX) 0.12 % solution 15 mL  15 mL Mouth/Throat BID Sary Adhikari MD   15 mL at 06/24/18 0900    dextrose 5 % solution  100 mL/hr Intravenous PRTETO Palencia MD        dextrose 50 % solution 12.5 g  12.5 g Intravenous PRN Armand Palencia MD        glucagon (rDNA) injection 1 mg  1 mg Intramuscular PRTETO Palencia MD        glucose (GLUTOSE) 40 % oral gel 15 g  15 g Oral PRTETO Palencia MD        acetaminophen (TYLENOL) tablet 650 mg  650 mg Oral Q4H PRTETO Palencia MD        bisacodyl (DULCOLAX) suppository 10 mg  10 mg Rectal Daily PRTETO Palencia MD        docusate sodium (COLACE) capsule 100 mg  100 mg Oral BID PRTETO Palencia MD        magnesium hydroxide (MILK OF MAGNESIA) 400 MG/5ML suspension 30 mL  30 mL Oral Daily PRTETO Palencia MD        aspirin EC tablet 81 mg  81 mg Oral Daily Armand Palencia MD   81 mg at 06/24/18 6142    cloNIDine (CATAPRES) tablet 0.1 mg  0.1 mg Oral Q2H PRTETO Palencia MD        HYDROcodone-acetaminophen (NORCO) 5-325 MG per tablet 1 tablet  1 tablet Oral Q4H PRTETO Palencia MD        Or    HYDROcodone-acetaminophen (NORCO) 5-325 MG per tablet 2 tablet  2 tablet Oral Q4H PRTETO Palencia MD   2 tablet at 06/23/18 2124    morphine (PF) injection 2 mg  2 mg Intravenous Q3H PRTETO Palencia MD        sodium chloride flush 0.9 % injection 10 mL  10 mL Intravenous 2 times per day Armand Palencia MD   10 mL at 06/24/18 0918    sodium chloride flush 0.9 % injection 10 mL  10 mL Intravenous PRN Armand Palencia MD   10 mL at 06/22/18 1554    ampicillin-sulbactam (UNASYN) 1.5 g IVPB minibag  1.5 g Intravenous Q24H Todd Lambert DO   Stopped at 06/23/18 2000    famotidine (PEPCID) tablet 20 mg  20 mg Oral Daily Armand Palencia MD   20 mg at 06/24/18 0916    heparin flush 100 UNIT/ML injection 200 Units  200 Units Intercatheter PRTETO Palencia MD        insulin lispro (HUMALOG) injection vial 0-35 Units  0-35 Units Subcutaneous 4x Daily AC & HS Armand Palencia MD   Stopped at 06/24/18 1223    LORazepam (ATIVAN) nontender, normal bowel sounds  Extremities: no cyanosis or edema  Skin: warm and dry without rash      Labs:  BMP: Recent Labs      06/22/18   0515  06/23/18   0619  06/24/18   0133   NA  134*  137  133*   K  4.0  4.0  4.4   CL  94*  98  95*   CO2  23 23 22   BUN  50*  43*  48*   CREATININE  7.0*  6.7*  7.8*   CALCIUM  7.9*  7.8*  7.6*     CBC: Recent Labs      06/22/18   0515  06/23/18   0619  06/24/18   0133   WBC  19.6*  17.2*  15.5*   HGB  8.2*  8.3*  7.9*   HCT  25.7*  26.9*  25.2*   MCV  93.1  94.4*  94.4*   PLT  426*  498*  538*     LIVER PROFILE:   Recent Labs      06/22/18   0515  06/23/18 0619 06/24/18 0133   AST  42*  43*  31   ALT  18 19 18   BILITOT  0.3  0.3  0.4   ALKPHOS  83  90  77     PT/INR:   Recent Labs      06/22/18 0515   PROTIME  14.8*   INR  1.17     APTT: No results for input(s): APTT in the last 72 hours. BNP:  No results for input(s): BNP in the last 72 hours. Ionized Calcium:No results for input(s): IONCA in the last 72 hours. Magnesium:  Recent Labs      06/22/18   0555  06/24/18 0133   MG  2.4  2.3     Phosphorus:No results for input(s): PHOS in the last 72 hours. HgbA1C: No results for input(s): LABA1C in the last 72 hours. Hepatic: Recent Labs      06/22/18   0515  06/23/18 0619  06/24/18   0133   ALKPHOS  83  90  77   ALT  18 19  18   AST  42*  43*  31   PROT  5.2*  4.9*  5.0*   BILITOT  0.3  0.3  0.4   LABALBU  2.5*  2.2*  2.2*     Lactic Acid: No results for input(s): LACTA in the last 72 hours. Troponin: No results for input(s): CKTOTAL, CKMB, TROPONINT in the last 72 hours. ABGs: No results for input(s): PH, PCO2, PO2, HCO3, O2SAT in the last 72 hours. CRP:  No results for input(s): CRP in the last 72 hours. Sed Rate:  No results for input(s): SEDRATE in the last 72 hours. Cultures:   No results for input(s): CULTURE in the last 72 hours. Recent Labs      06/22/18   1444  06/22/18   1540   BC  No Growth to date.   Any change in status will be

## 2018-06-24 NOTE — PROGRESS NOTES
Temp 99.3 °F (37.4 °C) (Temporal)   Resp 18   Ht 6' 1\" (1.854 m)   Wt 179 lb (81.2 kg)   SpO2 90%   BMI 23.62 kg/m²     CONSTITUTIONAL:  Awake, alert, cooperative, no apparent distress, and appears stated age. HEENT: Normal jugular venous pulsations, no carotid bruits. Head is atraumatic, normocephalic. Eyes and oral mucosa are normal.  LUNGS: Respiratory effort for the work of breathing is normal.  On auscultation: decreased breath sounds  CARDIOVASCULAR:  Normal apical impulse, regular rate and rhythm, normal S1 and S2, no S3 or S4, and no murmur or rub is noted. ABDOMEN: Soft, nontender, nondistended. Bowel sounds are present. No masses or tenderness. SKIN: Warm and dry. EXTREMITIES: no lower extremity edema. No cyanosis or clubbing. NEUROLOGY:  Motor movement grossly intact. Focal signs are not identified. Current Inpatient Medications:   morphine (PF)        vancomycin (VANCOCIN) intermittent dosing (placeholder)   Other RX Placeholder    chlorhexidine  15 mL Mouth/Throat BID    aspirin  81 mg Oral Daily    sodium chloride flush  10 mL Intravenous 2 times per day    ampicillin-sulbactam  1.5 g Intravenous Q24H    famotidine  20 mg Oral Daily    insulin lispro  0-35 Units Subcutaneous 4x Daily AC & HS    metoprolol tartrate  25 mg Oral BID    tamsulosin  0.4 mg Oral Daily    atorvastatin  20 mg Oral Daily    amiodarone bolus  150 mg Intravenous Once       IV Infusions (if any):   dextrose      amiodarone 450mg/250ml D5W infusion 0.5 mg/min (06/24/18 1135)    diltiazem (CARDIZEM) 125 mg in dextrose 5% 125 mL infusion Stopped (06/23/18 7555)       Diagnostics:    EKG: normal sinus rhythm, unchanged from previous tracings. ECHO: reviewed. It is totally useless. (nobody's fault). Stress Test: not obtained. Cardiac Angiography: not obtained.     ASSESSMENT:    Patient Active Problem List    Diagnosis Date Noted    SVT (supraventricular tachycardia) ([facility] [address] 06/22/2018    NORY

## 2018-06-24 NOTE — PROGRESS NOTES
Patient continues to have multiple small loose stools. No s/s of distress.  Will continue to monitor

## 2018-06-25 LAB
ALBUMIN SERPL-MCNC: 2.2 G/DL (ref 3.5–5.2)
ALP BLD-CCNC: 81 U/L (ref 40–130)
ALT SERPL-CCNC: 14 U/L (ref 5–41)
ANION GAP SERPL CALCULATED.3IONS-SCNC: 21 MMOL/L (ref 7–19)
AST SERPL-CCNC: 24 U/L (ref 5–40)
BASOPHILS ABSOLUTE: 0 K/UL (ref 0–0.2)
BASOPHILS RELATIVE PERCENT: 0.2 % (ref 0–1)
BILIRUB SERPL-MCNC: 0.3 MG/DL (ref 0.2–1.2)
BUN BLDV-MCNC: 57 MG/DL (ref 8–23)
CALCIUM SERPL-MCNC: 7.7 MG/DL (ref 8.8–10.2)
CHLORIDE BLD-SCNC: 92 MMOL/L (ref 98–111)
CO2: 18 MMOL/L (ref 22–29)
CREAT SERPL-MCNC: 9.2 MG/DL (ref 0.5–1.2)
CULTURE, RESPIRATORY: NORMAL
EKG P AXIS: 39 DEGREES
EKG P-R INTERVAL: 168 MS
EKG Q-T INTERVAL: 404 MS
EKG QRS DURATION: 110 MS
EKG QTC CALCULATION (BAZETT): 421 MS
EKG T AXIS: 52 DEGREES
EOSINOPHILS ABSOLUTE: 0.3 K/UL (ref 0–0.6)
EOSINOPHILS RELATIVE PERCENT: 1.6 % (ref 0–5)
GFR NON-AFRICAN AMERICAN: 6
GLUCOSE BLD-MCNC: 118 MG/DL (ref 74–109)
GLUCOSE BLD-MCNC: 124 MG/DL (ref 70–99)
GLUCOSE BLD-MCNC: 94 MG/DL (ref 70–99)
GRAM STAIN RESULT: NORMAL
HCT VFR BLD CALC: 23.7 % (ref 42–52)
HEMOGLOBIN: 7.5 G/DL (ref 14–18)
LYMPHOCYTES ABSOLUTE: 1.9 K/UL (ref 1.1–4.5)
LYMPHOCYTES RELATIVE PERCENT: 11.2 % (ref 20–40)
MAGNESIUM: 2.4 MG/DL (ref 1.6–2.4)
MCH RBC QN AUTO: 29.6 PG (ref 27–31)
MCHC RBC AUTO-ENTMCNC: 31.6 G/DL (ref 33–37)
MCV RBC AUTO: 93.7 FL (ref 80–94)
MONOCYTES ABSOLUTE: 1.5 K/UL (ref 0–0.9)
MONOCYTES RELATIVE PERCENT: 8.8 % (ref 0–10)
NEUTROPHILS ABSOLUTE: 12.6 K/UL (ref 1.5–7.5)
NEUTROPHILS RELATIVE PERCENT: 75.8 % (ref 50–65)
PDW BLD-RTO: 15 % (ref 11.5–14.5)
PERFORMED ON: ABNORMAL
PERFORMED ON: NORMAL
PLATELET # BLD: 619 K/UL (ref 130–400)
PMV BLD AUTO: 10.3 FL (ref 9.4–12.4)
POTASSIUM SERPL-SCNC: 4.5 MMOL/L (ref 3.5–5)
RBC # BLD: 2.53 M/UL (ref 4.7–6.1)
SODIUM BLD-SCNC: 131 MMOL/L (ref 136–145)
TOTAL PROTEIN: 5.1 G/DL (ref 6.6–8.7)
VANCOMYCIN RANDOM: 11.4 UG/ML
WBC # BLD: 16.6 K/UL (ref 4.8–10.8)

## 2018-06-25 PROCEDURE — 92610 EVALUATE SWALLOWING FUNCTION: CPT

## 2018-06-25 PROCEDURE — 82948 REAGENT STRIP/BLOOD GLUCOSE: CPT

## 2018-06-25 PROCEDURE — 2580000003 HC RX 258: Performed by: PSYCHIATRY & NEUROLOGY

## 2018-06-25 PROCEDURE — 6370000000 HC RX 637 (ALT 250 FOR IP): Performed by: PSYCHIATRY & NEUROLOGY

## 2018-06-25 PROCEDURE — 83735 ASSAY OF MAGNESIUM: CPT

## 2018-06-25 PROCEDURE — 99232 SBSQ HOSP IP/OBS MODERATE 35: CPT | Performed by: INTERNAL MEDICINE

## 2018-06-25 PROCEDURE — 6360000002 HC RX W HCPCS: Performed by: INTERNAL MEDICINE

## 2018-06-25 PROCEDURE — 6370000000 HC RX 637 (ALT 250 FOR IP): Performed by: INTERNAL MEDICINE

## 2018-06-25 PROCEDURE — 2700000000 HC OXYGEN THERAPY PER DAY

## 2018-06-25 PROCEDURE — 85025 COMPLETE CBC W/AUTO DIFF WBC: CPT

## 2018-06-25 PROCEDURE — G8996 SWALLOW CURRENT STATUS: HCPCS

## 2018-06-25 PROCEDURE — 6370000000 HC RX 637 (ALT 250 FOR IP): Performed by: PHYSICIAN ASSISTANT

## 2018-06-25 PROCEDURE — G8997 SWALLOW GOAL STATUS: HCPCS

## 2018-06-25 PROCEDURE — 36415 COLL VENOUS BLD VENIPUNCTURE: CPT

## 2018-06-25 PROCEDURE — 2580000003 HC RX 258: Performed by: INTERNAL MEDICINE

## 2018-06-25 PROCEDURE — 99231 SBSQ HOSP IP/OBS SF/LOW 25: CPT | Performed by: INTERNAL MEDICINE

## 2018-06-25 PROCEDURE — 80053 COMPREHEN METABOLIC PANEL: CPT

## 2018-06-25 PROCEDURE — 93005 ELECTROCARDIOGRAM TRACING: CPT

## 2018-06-25 PROCEDURE — 1210000000 HC MED SURG R&B

## 2018-06-25 PROCEDURE — 80202 ASSAY OF VANCOMYCIN: CPT

## 2018-06-25 RX ORDER — AMIODARONE HYDROCHLORIDE 200 MG/1
200 TABLET ORAL DAILY
Status: DISCONTINUED | OUTPATIENT
Start: 2018-07-03 | End: 2018-06-28

## 2018-06-25 RX ORDER — AMIODARONE HYDROCHLORIDE 200 MG/1
200 TABLET ORAL 2 TIMES DAILY
Status: DISCONTINUED | OUTPATIENT
Start: 2018-06-25 | End: 2018-06-28

## 2018-06-25 RX ADMIN — FAMOTIDINE 20 MG: 20 TABLET ORAL at 09:37

## 2018-06-25 RX ADMIN — METOPROLOL TARTRATE 25 MG: 25 TABLET ORAL at 09:37

## 2018-06-25 RX ADMIN — ASPIRIN 81 MG: 81 TABLET, COATED ORAL at 09:37

## 2018-06-25 RX ADMIN — VANCOMYCIN HYDROCHLORIDE 1250 MG: 5 INJECTION, POWDER, LYOPHILIZED, FOR SOLUTION INTRAVENOUS at 20:50

## 2018-06-25 RX ADMIN — AMIODARONE HYDROCHLORIDE 200 MG: 200 TABLET ORAL at 20:51

## 2018-06-25 RX ADMIN — HYDROCODONE BITARTRATE AND ACETAMINOPHEN 2 TABLET: 5; 325 TABLET ORAL at 21:09

## 2018-06-25 RX ADMIN — CHLORHEXIDINE GLUCONATE 15 ML: 1.2 RINSE ORAL at 20:50

## 2018-06-25 RX ADMIN — TAMSULOSIN HYDROCHLORIDE 0.4 MG: 0.4 CAPSULE ORAL at 09:37

## 2018-06-25 RX ADMIN — ATORVASTATIN CALCIUM 20 MG: 20 TABLET, FILM COATED ORAL at 09:37

## 2018-06-25 RX ADMIN — LOPERAMIDE HYDROCHLORIDE 2 MG: 2 CAPSULE ORAL at 09:37

## 2018-06-25 RX ADMIN — METOPROLOL TARTRATE 25 MG: 25 TABLET ORAL at 20:50

## 2018-06-25 RX ADMIN — Medication 10 ML: at 09:38

## 2018-06-25 RX ADMIN — CHLORHEXIDINE GLUCONATE 15 ML: 1.2 RINSE ORAL at 09:41

## 2018-06-25 RX ADMIN — Medication 10 ML: at 22:09

## 2018-06-25 RX ADMIN — AMIODARONE HYDROCHLORIDE 200 MG: 200 TABLET ORAL at 09:37

## 2018-06-25 RX ADMIN — SODIUM CHLORIDE 1.5 G: 900 INJECTION INTRAVENOUS at 16:25

## 2018-06-25 ASSESSMENT — PAIN SCALES - GENERAL
PAINLEVEL_OUTOF10: 2
PAINLEVEL_OUTOF10: 6
PAINLEVEL_OUTOF10: 6
PAINLEVEL_OUTOF10: 0

## 2018-06-25 NOTE — PROGRESS NOTES
Speech Language Pathology  Facility/Department: [facility] CORONARY CARE UNIT   BEDSIDE SWALLOW EVALUATION    NAME: Josh Mcclain  :   MRN: [id number]    ADMISSION DATE: 2018  ADMITTING DIAGNOSIS: has BPH (benign prostatic hyperplasia); Essential hypertension; Tobacco use; Primary osteoarthritis involving multiple joints; Mixed hyperlipidemia; AAA (abdominal aortic aneurysm) ([address] Type 2 diabetes mellitus with complication, without long-term current use of insulin ([address] Acute blood loss as cause of postoperative anemia; Coagulopathy ([address] Hyperkalemia; Acute renal failure (ARF) ([address] Ischemic colitis ([address] High serum lactate; LFTs abnormal; Anemia; Internal hemorrhoid; Hypocalcemia; S/P AAA repair; Weakness; Gait abnormality; Pain in left leg; SVT (supraventricular tachycardia) ([address] NORY (acute kidney injury) ([address] ATN (acute tubular necrosis) ([address] Leukocytosis; and Atrial flutter with rapid ventricular response ([address] on his problem list.    Date of Eval: 2018  Evaluating Therapist: Julissa Milian    Current Diet level:  Current Diet : Regular  Current Liquid Diet : Nectar    Reason for Referral  Josh Mcclain was referred for a bedside swallow evaluation to assess the efficiency of his swallow function, identify signs and symptoms of aspiration and make recommendations regarding safe dietary consistencies, effective compensatory strategies, and safe eating environment. Impression  Assessed patient's swallowing function. Patient exhibits fast oral transit and suspected swallow delay with even thicker liquid consistencies as well as sluggish, mild-moderately decreased laryngeal elevation for swallow airway protection. Patient, during evaluation, did exhibit consistent delayed coughs following all puree consistency trials and nectar thick liquid trials.  However, it is noted that patient also exhibited frequent coughs at rest.     Still recommend videofluoroscopic swallow study to

## 2018-06-25 NOTE — PROGRESS NOTES
Patient arrived to room 331 from HD. Patient states \"I'm miserable and cold. \" Warm blankets wrapped around patient. Patient given call light. Dolphin mattress plugged in. Bed alarm on. Will continue to monitor.

## 2018-06-25 NOTE — PROGRESS NOTES
Progress Note  6/25/2018 8:43 AM  Subjective:   Admit Date: 6/22/2018  PCP: Vinny Remy DO    CC: Tachycardia    Subjective: pt seen and examined. Overnight pt more sob, bp has been elevating as well. Denies any chest pain. No n/v. Has been off amio gtt since yesterday. ROS: 14 point review of systems is negative except as specifically addressed above. DIET RENAL; Nectar Thick; Cardiac    Intake/Output Summary (Last 24 hours) at 06/25/18 0843  Last data filed at 06/25/18 0600   Gross per 24 hour   Intake           488.79 ml   Output                0 ml   Net           488.79 ml     Medications:   dextrose       Current Facility-Administered Medications   Medication Dose Route Frequency Provider Last Rate Last Dose    loperamide (IMODIUM) capsule 2 mg  2 mg Oral 4x Daily PRN Nicolle Nick DO   2 mg at 06/24/18 1514    amiodarone (CORDARONE) tablet 200 mg  200 mg Oral BID JESSICA Choudhury MD   200 mg at 06/24/18 2115    amLODIPine (NORVASC) tablet 10 mg  10 mg Oral Daily Foster Lambert DO   10 mg at 06/24/18 1741    vancomycin (VANCOCIN) intermittent dosing (placeholder)   Other RX Placeholder Foster Lambert DO        heparin (porcine) injection 2,000 Units  2,000 Units Intercatheter PRN Arno Boeck, MD        chlorhexidine (PERIDEX) 0.12 % solution 15 mL  15 mL Mouth/Throat BID Arno Boeck, MD   15 mL at 06/24/18 0900    dextrose 5 % solution  100 mL/hr Intravenous PRN Arno Boeck, MD        dextrose 50 % solution 12.5 g  12.5 g Intravenous PRN Arno Boeck, MD        glucagon (rDNA) injection 1 mg  1 mg Intramuscular PRN Arno Boeck, MD        glucose (GLUTOSE) 40 % oral gel 15 g  15 g Oral PRN Arno Boeck, MD        acetaminophen (TYLENOL) tablet 650 mg  650 mg Oral Q4H PRN Arno Boeck, MD        bisacodyl (DULCOLAX) suppository 10 mg  10 mg Rectal Daily PRN Arno Boeck, MD        docusate sodium (COLACE) capsule 100 mg  100 mg Oral BID PRN Arno Boeck, MD 06/24/18   0133  06/25/18   0201   WBC  17.2*  15.5*  16.6*   RBC  2.85*  2.67*  2.53*   HGB  8.3*  7.9*  7.5*   HCT  26.9*  25.2*  23.7*   MCV  94.4*  94.4*  93.7   MCH  29.1  29.6  29.6   MCHC  30.9*  31.3*  31.6*   PLT  498*  538*  619*     Recent Labs      06/23/18   0619  06/24/18   0133  06/25/18   0201   NA  137  133*  131*   K  4.0  4.4  4.5   ANIONGAP  16  16  21*   CL  98  95*  92*   CO2  23  22  18*   BUN  43*  48*  57*   CREATININE  6.7*  7.8*  9.2*   GLUCOSE  132*  117*  118*   CALCIUM  7.8*  7.6*  7.7*     Recent Labs      06/24/18   0133  06/25/18   0201   MG  2.3  2.4     Recent Labs      06/23/18   0619  06/24/18 0133  06/25/18   0201   AST  43*  31  24   ALT  19  18  14   BILITOT  0.3  0.4  0.3   ALKPHOS  90  77  81     ABGs:No results for input(s): PHART, UDD8XIK, PO2ART, YQK0TCJ, BEART, HGBAE, C0FYAMQX, CARBOXHGBART, 02THERAPY in the last 72 hours. HgBA1c: No results for input(s): LABA1C in the last 72 hours. FLP:    Lab Results   Component Value Date    TRIG 153 01/23/2018    HDL 45 01/23/2018    LDLCALC 59 01/23/2018    LDLDIRECT 170 01/26/2017     TSH:    Lab Results   Component Value Date    TSH 5.080 06/22/2018     Troponin T: No results for input(s): TROPONINI in the last 72 hours. INR:   No results for input(s): INR in the last 72 hours.     Objective:   Vitals: BP (!) 181/60   Pulse 75   Temp 98.9 °F (37.2 °C)   Resp 20   Ht 6' 1\" (1.854 m)   Wt 195 lb (88.5 kg)   SpO2 95%   BMI 25.73 kg/m²   24HR INTAKE/OUTPUT:      Intake/Output Summary (Last 24 hours) at 06/25/18 0843  Last data filed at 06/25/18 0600   Gross per 24 hour   Intake           488.79 ml   Output                0 ml   Net           488.79 ml     General appearance: NAD, alert and cooperative with exam  HEENT: atraumatic, PERRLA, EOMI, anicteric, trachea midline  Lungs: no increased work of breathing, diminished at bases  Heart: RRR, +s1/s2, no rub  Abdomen: soft, nt/nd, +BS, no rebound/gaurding  Extremities: trace LE edema b/l, full ROM  Neurologic: AAOx3, no focal deficits  Skin: purple discoloration of L foot, L lateral thigh wound stable  Psych: Normal affect      Assessment and Plan:   Principal Problem:    SVT (supraventricular tachycardia)/aflutter w/ RVR - s/p DCCV, now in NSR. Cardizem gtt off. Amio gtt off, on oral amio and metoprolol    Active Problems:    BPH (benign prostatic hyperplasia) - on flomax      Essential hypertension - bp elevating had not been getting HD as refusing, needs to go today and willing. On metoprolol and amlodipine, will f/u bp post HD and increase meds as necessary       Mixed hyperlipidemia - on statin      AAA (abdominal aortic aneurysm) ([address] - s/p repair 6/11 by Dr. Ilsa Parekh      Type 2 diabetes mellitus with complication, without long-term current use of insulin ([address] - on ssi and doing well      Pain in left leg - improving, vascular following, wound stable      ESRD - on HD, outpatient chair arranged already from my understanding, will f/u      Leukocytosis - secondary to pna      HAP - on unasyn and vanco, fevers and wbc have improved. likely underlying aspiration. SPT working with patient      Diarrhea - c diff negative, ok for prn imodium     Advance Directive: Full Code    Pt does not want to return to 8th floor, wants to be discharged home when stable    Ok to transfer out to 3rd floor today.        Electronically signed by Mayela Corbin DO on 6/25/2018 at 8:43 AM

## 2018-06-25 NOTE — PROGRESS NOTES
[id number] [facility]  Progress Note                            Date:  6/25/2018  Patient: Josh Mcclain  Admission:  6/22/2018 11:02 AM  Admit DX: SVT (supraventricular tachycardia) ([address] [I47.1]  Age:  81 y.o., 1945     LOS: 3 days     Reason for evaluation:   atrial fibrillation/nsr      SUBJECTIVE:    The patient was seen and examined. Notes and labs reviewed. There were not complications over night. Patient's cardiac review of systems: negative for irregular heart beat. The patient is  unchanged. Short of breath during night. To resume dialysis today      OBJECTIVE:    Telemetry: NSR lungs with wheezes.        amiodarone  200 mg Oral BID    Followed by   Ghada Guerrero ON 7/3/2018] amiodarone  200 mg Oral Daily    amLODIPine  10 mg Oral Daily    vancomycin (VANCOCIN) intermittent dosing (placeholder)   Other RX Placeholder    chlorhexidine  15 mL Mouth/Throat BID    aspirin  81 mg Oral Daily    sodium chloride flush  10 mL Intravenous 2 times per day    ampicillin-sulbactam  1.5 g Intravenous Q24H    famotidine  20 mg Oral Daily    insulin lispro  0-35 Units Subcutaneous 4x Daily AC & HS    metoprolol tartrate  25 mg Oral BID    tamsulosin  0.4 mg Oral Daily    atorvastatin  20 mg Oral Daily        dextrose             BP (!) 176/70   Pulse 70   Temp 98.9 °F (37.2 °C)   Resp 15   Ht 6' 1\" (1.854 m)   Wt 195 lb (88.5 kg)   SpO2 93%   BMI 25.73 kg/m²     Intake/Output Summary (Last 24 hours) at 06/25/18 1214  Last data filed at 06/25/18 0600   Gross per 24 hour   Intake           478.79 ml   Output                0 ml   Net           478.79 ml           Labs:   CBC:   Recent Labs      06/24/18   0133  06/25/18   0201   WBC  15.5*  16.6*   HGB  7.9*  7.5*   HCT  25.2*  23.7*   PLT  538*  619*     BMP: Recent Labs      06/24/18   0133  06/25/18   0201   NA  133*  131*   K  4.4  4.5   CO2  22  18*   BUN  48*  57*   CREATININE  7.8*  9.2*   LABGLOM  7*  6*   GLUCOSE  117*

## 2018-06-25 NOTE — PROGRESS NOTES
Assessment completed. Assisted pt with bedpan. He had small, loose BM. [facility] care completed. Offered breakfast tray but he declined at this time.

## 2018-06-25 NOTE — PROGRESS NOTES
LAPAROTOMY EXPLORATORY performed by Lizeth Lantigua MD at [facility] 11 ANEURYSM/GRFT INS,ABD AORT W/VISCER N/A 6/11/2018    OPEN REPAIR OF ABDOMINAL AORTIC ANEURYSM REPAIR WITH [id number] BYPASS WITH CELL SAVER performed by Az Bernstein MD at [facility] FLX DX W/COLLJ SPEC BR/WA IF PFRMD N/A 6/12/2018    Dr Carpio-Likely ischemic colitis, internal hemorrhoids    TONSILLECTOMY      VASCULAR SURGERY  06/11/2018    DJM. AAA/lliac aneurysm repair, R fem EA/bypass    VASCULAR SURGERY  06/11/2018    DJM. Repair of abdominal aortic aneurysm and bilateral iliac aneurysm with aorta left iliac on the right and right femoral on the left with right common femoral endarterectomy with 18x9 gore-juliocesar bifurcated graft. Family History  Family History   Problem Relation Age of Onset    Other Mother     Cancer Father        Social History  Social History     Social History    Marital status: Single     Spouse name: N/A    Number of children: N/A    Years of education: N/A     Occupational History    Not on file. Social History Main Topics    Smoking status: Current Every Day Smoker     Packs/day: 0.50     Years: 50.00     Types: Cigarettes    Smokeless tobacco: Never Used    Alcohol use Yes      Comment: 1 beer per year    Drug use: No    Sexual activity: Not Currently     Other Topics Concern    Not on file     Social History Narrative    No narrative on file         Review of Systems:  History obtained from chart review and the patient  General ROS: No fever or chills  Respiratory ROS: No cough, shortness of breath, wheezing  Cardiovascular ROS: no chest pain or dyspnea on exertion  Gastrointestinal ROS: No abdominal pain or melena  Genito-Urinary ROS: No dysuria or hematuria  Musculoskeletal ROS: No joint pain or swelling             Objective:  Blood pressure (!) 176/70, pulse 70, temperature 98.9 °F (37.2 °C), resp.  rate 15, height 6' 1\" (1.854 m), weight 195 lb (88.5 kg), pelvis, likely hemorrhage/hematoma. Patient had open abdominal aortic/iliac aneurysm repair yesterday and is presumably related. 2. Sonographically normal kidneys. Discussed with Dr. Mayito Danielson at time of dictation. Signed by Dr Lena Vidal on 6/12/2018 9:18 AM    Xr Chest Portable    Result Date: 6/13/2018  XR CHEST PORTABLE 6/13/2018 4:00 AM HISTORY: On ventilator COMPARISON: June 12, 2018. FINDINGS: The lungs are clear. Left internal jugular double lumen catheter is present satisfactorily positioned. Endotracheal tube nasogastric tube are satisfactorily positioned. . The cardiomediastinal silhouette and pulmonary vascularity are within normal limits. The osseous structures and surrounding soft tissues demonstrate no acute abnormality. 1. No radiographic evidence of acute cardiopulmonary process. Signed by Dr Maricruz Barbosa on 6/13/2018 7:47 AM    Xr Chest Portable    Result Date: 6/12/2018  XR CHEST PORTABLE 6/12/2018 4:30 AM HISTORY: Respiratory failure COMPARISON: June 11, 2018. FINDINGS: The lungs are clear. Skinfold projects over the right chest Cardiac silhouettes normal. Endotracheal tube is present. Nasogastric tube is present. . The osseous structures and surrounding soft tissues demonstrate no acute abnormality. 1. No radiographic evidence of acute cardiopulmonary process. Signed by Dr Maricruz Barbosa on 6/12/2018 7:24 AM    Xr Chest Portable    Result Date: 6/11/2018  XR CHEST PORTABLE 6/11/2018 12:45 PM HISTORY: Postop COMPARISON: May 29, 2018. FINDINGS: Opacification right lung base is present this is a small infiltrate. This may be atelectasis or an early inflammatory process. Endotracheal tube and nasogastric tubes are satisfactorily positioned. Left lung is clear. . The cardiomediastinal silhouette and pulmonary vascularity are within normal limits. The osseous structures and surrounding soft tissues demonstrate no acute abnormality.     1. Opacification right lung base is noted this may be early

## 2018-06-25 NOTE — CONSULTS
[facility] [address], [address] [facility]                                   CONSULTATION    PATIENT NAME: Vijay Mccann                 :        1945  MED REC NO:   [id number]                              ROOM:       [facility]  ACCOUNT NO:   [de-identified]                           ADMIT DATE: 2018  PROVIDER:     Trixie Tinoco MD    CONSULT DATE:  2018    The patient is well known to me from recent aortic aneurysm repair, which  was complicated by renal failure. He is in the Acute Rehab Unit, where he  was noted to have a supraventricular tachycardia. He was in dialysis and  noted to have a heart rate in the 150s and it was felt _____  supraventricular tachycardia, so he is admitted by the Medical Service to  the Coronary Care Unit for further treatment and care. At that time, he is  asymptomatic. No shortness of breath. No chest pain. In regards to his  aneurysm repair, he is coming along satisfactorily. He is still in renal  failure getting regular dialysis. His left leg is weak and he is  benefiting from therapy, but obviously, this will have to be put on hold  until these cardiac issues can get resolved. We will follow along while  hospitalized.         Kenroy Joseph MD    D: 2018 8:48:17      T: 2018 10:22:57     [id number]  Job#: [id number]     Doc#: [id number]    CC:

## 2018-06-25 NOTE — PROGRESS NOTES
Pharmacy Vancomycin Consult     Vancomycin Day: 3  Current Dosing: Pulse dosing    Temp max:  98.9    Recent Labs      06/24/18   0133  06/25/18   0201   BUN  48*  57*       Recent Labs      06/24/18   0133  06/25/18   0201   CREATININE  7.8*  9.2*       Recent Labs      06/24/18   0133  06/25/18   0201   WBC  15.5*  16.6*         Intake/Output Summary (Last 24 hours) at 06/25/18 1730  Last data filed at 06/25/18 0600   Gross per 24 hour   Intake 478.79 ml   Output 0 ml   Net 478.79 ml       Culture Date Source Results   06/22/18 Blood No growth   06/23/18 Respiratory Gram + cocci in clusters            Ht Readings from Last 1 Encounters:   06/22/18 6' 1\" (1.854 m)        Wt Readings from Last 1 Encounters:   06/25/18 195 lb (88.5 kg)         Body mass index is 25.73 kg/m². Estimated Creatinine Clearance: 8 mL/min (A) (based on SCr of 9.2 mg/dL (H)). Random: 11.4     Assessment/Plan: Vancomycin 1250 mg IV x 1 dose tonight; Pharmacy will continue to follow and make adjustments as needed.     Electronically signed by Wilfredo Leblanc, [facility] on 6/25/2018 at 5:30 PM

## 2018-06-26 ENCOUNTER — APPOINTMENT (OUTPATIENT)
Dept: GENERAL RADIOLOGY | Age: 73
DRG: 308 | End: 2018-06-26
Attending: INTERNAL MEDICINE
Payer: MEDICARE

## 2018-06-26 LAB
ANION GAP SERPL CALCULATED.3IONS-SCNC: 18 MMOL/L (ref 7–19)
BASOPHILS ABSOLUTE: 0 K/UL (ref 0–0.2)
BASOPHILS RELATIVE PERCENT: 0.2 % (ref 0–1)
BUN BLDV-MCNC: 37 MG/DL (ref 8–23)
CALCIUM SERPL-MCNC: 7.8 MG/DL (ref 8.8–10.2)
CHLORIDE BLD-SCNC: 94 MMOL/L (ref 98–111)
CO2: 23 MMOL/L (ref 22–29)
CREAT SERPL-MCNC: 6.9 MG/DL (ref 0.5–1.2)
EOSINOPHILS ABSOLUTE: 0.1 K/UL (ref 0–0.6)
EOSINOPHILS RELATIVE PERCENT: 0.9 % (ref 0–5)
FOLATE: 14.4 NG/ML (ref 4.5–32.2)
GFR NON-AFRICAN AMERICAN: 8
GLUCOSE BLD-MCNC: 106 MG/DL (ref 74–109)
GLUCOSE BLD-MCNC: 110 MG/DL (ref 70–99)
GLUCOSE BLD-MCNC: 113 MG/DL (ref 70–99)
GLUCOSE BLD-MCNC: 117 MG/DL (ref 70–99)
GLUCOSE BLD-MCNC: 164 MG/DL (ref 70–99)
HCT VFR BLD CALC: 21.6 % (ref 42–52)
HCT VFR BLD CALC: 22.4 % (ref 42–52)
HEMOGLOBIN: 6.9 G/DL (ref 14–18)
HEMOGLOBIN: 7.1 G/DL (ref 14–18)
LYMPHOCYTES ABSOLUTE: 1.4 K/UL (ref 1.1–4.5)
LYMPHOCYTES RELATIVE PERCENT: 10.9 % (ref 20–40)
MCH RBC QN AUTO: 29.6 PG (ref 27–31)
MCH RBC QN AUTO: 30 PG (ref 27–31)
MCHC RBC AUTO-ENTMCNC: 31.7 G/DL (ref 33–37)
MCHC RBC AUTO-ENTMCNC: 31.9 G/DL (ref 33–37)
MCV RBC AUTO: 93.3 FL (ref 80–94)
MCV RBC AUTO: 93.9 FL (ref 80–94)
MONOCYTES ABSOLUTE: 1.4 K/UL (ref 0–0.9)
MONOCYTES RELATIVE PERCENT: 10.6 % (ref 0–10)
NEUTROPHILS ABSOLUTE: 9.7 K/UL (ref 1.5–7.5)
NEUTROPHILS RELATIVE PERCENT: 75.1 % (ref 50–65)
PDW BLD-RTO: 15 % (ref 11.5–14.5)
PDW BLD-RTO: 15.3 % (ref 11.5–14.5)
PERFORMED ON: ABNORMAL
PLATELET # BLD: 610 K/UL (ref 130–400)
PLATELET # BLD: 653 K/UL (ref 130–400)
PMV BLD AUTO: 10.3 FL (ref 9.4–12.4)
PMV BLD AUTO: 9.7 FL (ref 9.4–12.4)
POTASSIUM SERPL-SCNC: 4.1 MMOL/L (ref 3.5–5)
RBC # BLD: 2.3 M/UL (ref 4.7–6.1)
RBC # BLD: 2.4 M/UL (ref 4.7–6.1)
SODIUM BLD-SCNC: 135 MMOL/L (ref 136–145)
WBC # BLD: 12.4 K/UL (ref 4.8–10.8)
WBC # BLD: 12.9 K/UL (ref 4.8–10.8)

## 2018-06-26 PROCEDURE — 36415 COLL VENOUS BLD VENIPUNCTURE: CPT

## 2018-06-26 PROCEDURE — 92611 MOTION FLUOROSCOPY/SWALLOW: CPT

## 2018-06-26 PROCEDURE — 99024 POSTOP FOLLOW-UP VISIT: CPT | Performed by: SURGERY

## 2018-06-26 PROCEDURE — 74018 RADEX ABDOMEN 1 VIEW: CPT

## 2018-06-26 PROCEDURE — 99231 SBSQ HOSP IP/OBS SF/LOW 25: CPT | Performed by: INTERNAL MEDICINE

## 2018-06-26 PROCEDURE — 85027 COMPLETE CBC AUTOMATED: CPT

## 2018-06-26 PROCEDURE — 86850 RBC ANTIBODY SCREEN: CPT

## 2018-06-26 PROCEDURE — 85025 COMPLETE CBC W/AUTO DIFF WBC: CPT

## 2018-06-26 PROCEDURE — 6370000000 HC RX 637 (ALT 250 FOR IP): Performed by: INTERNAL MEDICINE

## 2018-06-26 PROCEDURE — 99232 SBSQ HOSP IP/OBS MODERATE 35: CPT | Performed by: HOSPITALIST

## 2018-06-26 PROCEDURE — 99221 1ST HOSP IP/OBS SF/LOW 40: CPT | Performed by: NURSE PRACTITIONER

## 2018-06-26 PROCEDURE — 2580000003 HC RX 258: Performed by: PSYCHIATRY & NEUROLOGY

## 2018-06-26 PROCEDURE — 94761 N-INVAS EAR/PLS OXIMETRY MLT: CPT

## 2018-06-26 PROCEDURE — 82948 REAGENT STRIP/BLOOD GLUCOSE: CPT

## 2018-06-26 PROCEDURE — 74230 X-RAY XM SWLNG FUNCJ C+: CPT

## 2018-06-26 PROCEDURE — 6370000000 HC RX 637 (ALT 250 FOR IP): Performed by: PSYCHIATRY & NEUROLOGY

## 2018-06-26 PROCEDURE — 86900 BLOOD TYPING SEROLOGIC ABO: CPT

## 2018-06-26 PROCEDURE — 80048 BASIC METABOLIC PNL TOTAL CA: CPT

## 2018-06-26 PROCEDURE — 1210000000 HC MED SURG R&B

## 2018-06-26 PROCEDURE — 94664 DEMO&/EVAL PT USE INHALER: CPT

## 2018-06-26 PROCEDURE — 86901 BLOOD TYPING SEROLOGIC RH(D): CPT

## 2018-06-26 PROCEDURE — 6370000000 HC RX 637 (ALT 250 FOR IP): Performed by: SURGERY

## 2018-06-26 PROCEDURE — 6360000002 HC RX W HCPCS: Performed by: HOSPITALIST

## 2018-06-26 PROCEDURE — 2580000003 HC RX 258: Performed by: HOSPITALIST

## 2018-06-26 PROCEDURE — 2700000000 HC OXYGEN THERAPY PER DAY

## 2018-06-26 PROCEDURE — 82746 ASSAY OF FOLIC ACID SERUM: CPT

## 2018-06-26 PROCEDURE — 6370000000 HC RX 637 (ALT 250 FOR IP): Performed by: PHYSICIAN ASSISTANT

## 2018-06-26 PROCEDURE — 6370000000 HC RX 637 (ALT 250 FOR IP): Performed by: HOSPITALIST

## 2018-06-26 RX ORDER — PANTOPRAZOLE SODIUM 40 MG/1
40 TABLET, DELAYED RELEASE ORAL
Status: DISCONTINUED | OUTPATIENT
Start: 2018-06-27 | End: 2018-06-27

## 2018-06-26 RX ORDER — LACTOBACILLUS RHAMNOSUS GG 10B CELL
1 CAPSULE ORAL DAILY
Status: DISCONTINUED | OUTPATIENT
Start: 2018-06-26 | End: 2018-06-29 | Stop reason: HOSPADM

## 2018-06-26 RX ORDER — GUAIFENESIN 600 MG/1
600 TABLET, EXTENDED RELEASE ORAL 2 TIMES DAILY
Status: DISCONTINUED | OUTPATIENT
Start: 2018-06-26 | End: 2018-06-29 | Stop reason: HOSPADM

## 2018-06-26 RX ADMIN — AMIODARONE HYDROCHLORIDE 200 MG: 200 TABLET ORAL at 11:43

## 2018-06-26 RX ADMIN — METOPROLOL TARTRATE 25 MG: 25 TABLET ORAL at 11:44

## 2018-06-26 RX ADMIN — TAMSULOSIN HYDROCHLORIDE 0.4 MG: 0.4 CAPSULE ORAL at 11:43

## 2018-06-26 RX ADMIN — HYDROCODONE BITARTRATE AND ACETAMINOPHEN 2 TABLET: 5; 325 TABLET ORAL at 20:54

## 2018-06-26 RX ADMIN — MEROPENEM 1 G: 1 INJECTION, POWDER, FOR SOLUTION INTRAVENOUS at 22:13

## 2018-06-26 RX ADMIN — ASPIRIN 81 MG: 81 TABLET, COATED ORAL at 11:43

## 2018-06-26 RX ADMIN — FAMOTIDINE 20 MG: 20 TABLET ORAL at 11:44

## 2018-06-26 RX ADMIN — AMIODARONE HYDROCHLORIDE 200 MG: 200 TABLET ORAL at 20:54

## 2018-06-26 RX ADMIN — CHLORHEXIDINE GLUCONATE 15 ML: 1.2 RINSE ORAL at 11:44

## 2018-06-26 RX ADMIN — COLLAGENASE SANTYL: 250 OINTMENT TOPICAL at 20:55

## 2018-06-26 RX ADMIN — METOPROLOL TARTRATE 25 MG: 25 TABLET ORAL at 20:54

## 2018-06-26 RX ADMIN — GUAIFENESIN 600 MG: 600 TABLET, EXTENDED RELEASE ORAL at 20:54

## 2018-06-26 RX ADMIN — ATORVASTATIN CALCIUM 20 MG: 20 TABLET, FILM COATED ORAL at 11:43

## 2018-06-26 RX ADMIN — AMLODIPINE BESYLATE 10 MG: 10 TABLET ORAL at 11:43

## 2018-06-26 RX ADMIN — Medication 10 ML: at 20:55

## 2018-06-26 RX ADMIN — CHLORHEXIDINE GLUCONATE 15 ML: 1.2 RINSE ORAL at 20:55

## 2018-06-26 RX ADMIN — Medication 10 ML: at 11:44

## 2018-06-26 ASSESSMENT — ENCOUNTER SYMPTOMS
SHORTNESS OF BREATH: 0
HEARTBURN: 0
EYES NEGATIVE: 1
COUGH: 0
ORTHOPNEA: 0
NAUSEA: 0
HEMOPTYSIS: 0
SPUTUM PRODUCTION: 0

## 2018-06-26 ASSESSMENT — PAIN SCALES - GENERAL: PAINLEVEL_OUTOF10: 5

## 2018-06-26 NOTE — PROGRESS NOTES
Vascular    Feeling better, no SOB    afeb    Abd soft/nt  LLE stable    OOB  Need to arrange home dialysis  ?  Home soon    [facility]

## 2018-06-26 NOTE — PROCEDURES
INSTRUMENTAL SWALLOW REPORT  MODIFIED BARIUM SWALLOW    NAME: Alcides York   : [id number]  MRN: [id number]       Date of Eval: 2018        Radiologist: DR. Dane Jason     Referring Diagnosis(es):      Past Medical History:  has a past medical history of Aneurysm ([facility] [address] Arthritis; BPH (benign prostatic hyperplasia); CAD (coronary artery disease); Chronic back pain; Diabetes ([facility] [address] Erectile dysfunction; Hemodialysis patient [facility]); History of blood transfusion; Hypertension; Osteoarthritis; and Palliative care encounter. Past Surgical History:  has a past surgical history that includes Mastoid surgery (Left); Appendectomy; Tonsillectomy; Neck surgery; back surgery; hip surgery (Left); pr repr aneurysm/grft ins,abd aort w/viscer (N/A, 2018); vascular surgery (2018); vascular surgery (2018); pr exploratory of abdomen (N/A, 2018); hc dialysis catheter (2018); pr sigmoidoscopy flx dx w/collj spec br/wa if pfrmd (N/A, 2018); Abdomen surgery; joint replacement; and Colonoscopy. Current Diet Solid Consistency: Regular  Current Diet Liquid Consistency: Nectar       Type of Study: Initial MBS         Patient Complaints/Reason for Referral:  Alcides York was referred for a MBS to assess the efficiency of his/her swallow function, assess for aspiration, and to make recommendations regarding safe dietary consistencies, effective compensatory strategies, and safe eating environment. Onset of problem:   DYSPHAGIA STUDY TO RULE OUT ASPIRATION    General Comment  Comments: COUGHING/ CLEARING THROAT THROUGHOUT EVERY MEAL       Behavior/Cognition/Vision/Hearing:  Behavior/Cognition: Alert  Vision: Within Functional Limits  Hearing: Within functional limits    Impressions:  Treatment Dx and ICD 10:    Patient Position: Lateral and        Consistencies Administered: Nectar cup; Thin cup;Puree;Reg solid;Mechanical soft solid    Compensatory Swallowing Strategies Attempted: Alternate solids and liquids;Upright as possible for all oral intake;Small bites/sips;Swallow 2 times per bite/sip; Remain upright for 30-45 minutes after meals;Eat/Feed slowly         Oral Phase: PATIENT PRESENTED NECTAR THICK, AND THIN BARIUM, NO ORAL LEAKAGE, TIMELY ORAL TRANSIT. TIMELY TRANSIT WITH PUREED CONSISTENCY. MIXED AND SOLID CONSISTENCY DEMONSTRATED FUNCTIONAL TRANSIT BUT PREMATURE LOSS TO VALLECULAE WITH MIXED CONSISTENCY. Pharyngeal: PATIENT PRESENTED NECTAR THICK BARIUM VIA CUP WITH TIMELY ORAL TRANSIT. PATIENT DEMONSTRATED POOLING IN VALLECULAE BUT NOTED EPIGLOTTIC INVERSION WITH LARYNGEAL ELEVATION, NO PENETRATION OR ASPIRATION. PATIENTS DEMONSTRATED A CURVED EPIGLOTTIS WHEN INVERTED. PATIENT PRESENTED THIN BARUIM VIA CUP WITH NOTED EPIGLOTTIC INVERSION, NO PENETRATION OR ASPIRATION, HOWEVER CONTINUED RESIDUE IN VALLECULAE. HAD TO CUE TO SWALLOW BUT STILL DID NOT CLEAR RESIDUE. PATIENT TOLERATED PUREED CONSISTENCY NO ASPIRATION. MIXED CONSISTENCY DEMONSTRATED PREMATURE LOSS TO VALLECULAE BUT NOTED EPIGLOTTIC INVERSION, NO PENETRATION OR ASPIRATION. TOLERATED SOLID CONSISTENCY WITH PROLONGED MASTICATION, SLOW TONGUE BASE RETRACTION, REQUIRED THIN LIQUID [address] TO CLEAR BOLUS, CONTINUED RESIDUE IN VALLECULAE, NO ASPIRATION WITH ANY CONSISTENCIES PRESENTED. REQUIRED MULTIPLE SWALLOWS AND LIQUID [address] TO CLEAR RESIDUE. IT IS RECOMMENDED THAT PATIENT BE UPGRADED TO THIN LIQUIDS AND CHANGED TO [address] [place] [address] III WITH EXTRA GRAVIES. PATIENT MUST FOLLOW COMPENSATORY STRATEGIES TO AID IN CLEARING RESIDUE BETWEEN BITES AND THROUGHOUT MEALS. INCLUDING LIQUID WASH, CLEAR/ SWALLOW, SWALLOW TWICE BETWEEN BITES. Dysphagia Outcome Severity Scale: Level 5: Mild dysphagia- Distant supervision.  May need one diet consistency restricted  Penetration-Aspiration Scale (PAS): 1 - Material does not enter the airway    Recommended Diet:  Solid consistency: Dysphagia III Advanced  Liquid consistency:

## 2018-06-26 NOTE — PROGRESS NOTES
Jigar Peterson MD   200 mg at 06/26/18 1143    Followed by   Maggie Fernández ON 7/3/2018] amiodarone (CORDARONE) tablet 200 mg  200 mg Oral Daily JESSICA Peterson MD        loperamide (IMODIUM) capsule 2 mg  2 mg Oral 4x Daily PRN Hope Call, DO   2 mg at 06/25/18 4968    amLODIPine (NORVASC) tablet 10 mg  10 mg Oral Daily Haseeb Tiptonnarcisa Joynereo, DO   10 mg at 06/26/18 1143    vancomycin (VANCOCIN) intermittent dosing (placeholder)   Other RX Placeholder Haseeb Lambert, DO        heparin (porcine) injection 2,000 Units  2,000 Units Intercatheter PRN Conchis Wilkerson MD        chlorhexidine (PERIDEX) 0.12 % solution 15 mL  15 mL Mouth/Throat BID Conchis Wilkerson MD   15 mL at 06/26/18 1144    dextrose 5 % solution  100 mL/hr Intravenous PRN Conchis Wilkerson MD        dextrose 50 % solution 12.5 g  12.5 g Intravenous PRN Conchis Wilkerson MD        glucagon (rDNA) injection 1 mg  1 mg Intramuscular PRN Conchis Wilkerson MD        glucose (GLUTOSE) 40 % oral gel 15 g  15 g Oral PRN Conchis Wilkerson MD        acetaminophen (TYLENOL) tablet 650 mg  650 mg Oral Q4H PRN Conchis Wilkerson MD        bisacodyl (DULCOLAX) suppository 10 mg  10 mg Rectal Daily PRN Conchis Wilkerson MD        docusate sodium (COLACE) capsule 100 mg  100 mg Oral BID PRN Conchis Wilkerson MD        magnesium hydroxide (MILK OF MAGNESIA) 400 MG/5ML suspension 30 mL  30 mL Oral Daily PRN Conchis Wilkerson MD        aspirin EC tablet 81 mg  81 mg Oral Daily Conchis Wilkerson MD   81 mg at 06/26/18 1143    cloNIDine (CATAPRES) tablet 0.1 mg  0.1 mg Oral Q2H PRN Conchis Wilkerson MD        HYDROcodone-acetaminophen (NORCO) 5-325 MG per tablet 1 tablet  1 tablet Oral Q4H PRN Conchis Wilkerson MD        Or    HYDROcodone-acetaminophen (NORCO) 5-325 MG per tablet 2 tablet  2 tablet Oral Q4H PRN Conchis Wilkerson MD   2 tablet at 06/25/18 9325    morphine (PF) injection 2 mg  2 mg Intravenous Q3H PRN Conchis Wilkerson MD   2 mg at 06/24/18 8863    sodium chloride flush 0.9 % injection 10 mL  10 mL Intravenous 2 times per day Diamond Denis MD   10 mL at 06/26/18 1144    sodium chloride flush 0.9 % injection 10 mL  10 mL Intravenous PRN Diamond Denis MD   10 mL at 06/22/18 1554    ampicillin-sulbactam (UNASYN) 1.5 g IVPB minibag  1.5 g Intravenous Q24H Donel Robyn Petra, DO   Stopped at 06/25/18 1655    heparin flush 100 UNIT/ML injection 200 Units  200 Units Intercatheter PRN Diamond Denis MD        insulin lispro (HUMALOG) injection vial 0-35 Units  0-35 Units Subcutaneous 4x Daily AC & HS Diamond Denis MD   Stopped at 06/24/18 1223    LORazepam (ATIVAN) injection 1 mg  1 mg Intravenous Q4H PRN Diamond Denis MD        metoprolol tartrate (LOPRESSOR) tablet 25 mg  25 mg Oral BID Diamond Denis MD   25 mg at 06/26/18 1144    ipratropium-albuterol (DUONEB) nebulizer solution 1 ampule  1 ampule Inhalation Q4H PRN Agu[facility], DO   1 ampule at 06/24/18 2332    tamsulosin (FLOMAX) capsule 0.4 mg  0.4 mg Oral Daily Geovani James PA-C   0.4 mg at 06/26/18 1143    atorvastatin (LIPITOR) tablet 20 mg  20 mg Oral Daily Geovani James PA-C   20 mg at 06/26/18 1143       Past Medical History:  Past Medical History:   Diagnosis Date    [facility]     Arthritis     BPH (benign prostatic hyperplasia)     CAD (coronary artery disease)     Chronic back pain     Diabetes ([facility]     type 1    Erectile dysfunction     [facility]     History of blood transfusion     Hypertension     Osteoarthritis     Palliative care encounter 06/25/2018       Past Surgical History:  Past Surgical History:   Procedure Laterality Date    ABDOMEN SURGERY      APPENDECTOMY      BACK SURGERY      COLONOSCOPY      HC DIALYSIS CATHETER  6/12/2018    CATHETER INSERTION HEMODIALYSIS performed by Walker Melgoza MD at [address] [facility] Left     JOINT REPLACEMENT      MASTOID SURGERY Left     NECK SURGERY      TN EXPLORATORY OF ABDOMEN N/A 6/12/2018    LAPAROTOMY EXPLORATORY performed by Kishor Sinha MD at [facility] ANEURYSM/GRFT INS,ABD AORT W/VISCER N/A 6/11/2018    OPEN REPAIR OF ABDOMINAL AORTIC ANEURYSM REPAIR WITH [id number]-LL-UJTFY BYPASS WITH CELL SAVER performed by Sloan Mahan MD at [facility] FLX DX W/COLLJ SPEC BR/WA IF PFRMD N/A 6/12/2018    Dr Carpio-Likely ischemic colitis, internal hemorrhoids    TONSILLECTOMY      VASCULAR SURGERY  06/11/2018    DJM. AAA/lliac aneurysm repair, R fem EA/bypass    VASCULAR SURGERY  06/11/2018    DJM. Repair of abdominal aortic aneurysm and bilateral iliac aneurysm with aorta left iliac on the right and right femoral on the left with right common femoral endarterectomy with 18x9 gore-juliocesar bifurcated graft. Family History  Family History   Problem Relation Age of Onset    Other Mother     Cancer Father        Social History  Social History     Social History    Marital status: Single     Spouse name: N/A    Number of children: N/A    Years of education: N/A     Occupational History    Not on file. Social History Main Topics    Smoking status: Current Every Day Smoker     Packs/day: 0.50     Years: 50.00     Types: Cigarettes    Smokeless tobacco: Never Used    Alcohol use Yes      Comment: 1 beer per year    Drug use: No    Sexual activity: Not Currently     Other Topics Concern    Not on file     Social History Narrative    No narrative on file         Review of Systems:  History obtained from chart review and the patient  General ROS: No fever or chills  Respiratory ROS: No cough, shortness of breath, wheezing  Cardiovascular ROS: no chest pain or dyspnea on exertion  Gastrointestinal ROS: No abdominal pain or melena  Genito-Urinary ROS: No dysuria or hematuria  Musculoskeletal ROS: No joint pain or swelling             Objective:  Blood pressure (!) 168/68, pulse 68, temperature 98.6 °F (37 °C), temperature source Temporal, resp.  rate 16, height 6' 1\" (1.854 m), weight 186 hemorrhage/hematoma. Patient had open abdominal aortic/iliac aneurysm repair yesterday and is presumably related. 2. Sonographically normal kidneys. Discussed with Dr. Victorina Caldera at time of dictation. Signed by Dr Alicia Larson on 6/12/2018 9:18 AM    Xr Chest Portable    Result Date: 6/13/2018  XR CHEST PORTABLE 6/13/2018 4:00 AM HISTORY: On ventilator COMPARISON: June 12, 2018. FINDINGS: The lungs are clear. Left internal jugular double lumen catheter is present satisfactorily positioned. Endotracheal tube nasogastric tube are satisfactorily positioned. . The cardiomediastinal silhouette and pulmonary vascularity are within normal limits. The osseous structures and surrounding soft tissues demonstrate no acute abnormality. 1. No radiographic evidence of acute cardiopulmonary process. Signed by Dr Max Matias on 6/13/2018 7:47 AM    Xr Chest Portable    Result Date: 6/12/2018  XR CHEST PORTABLE 6/12/2018 4:30 AM HISTORY: Respiratory failure COMPARISON: June 11, 2018. FINDINGS: The lungs are clear. Skinfold projects over the right chest Cardiac silhouettes normal. Endotracheal tube is present. Nasogastric tube is present. . The osseous structures and surrounding soft tissues demonstrate no acute abnormality. 1. No radiographic evidence of acute cardiopulmonary process. Signed by Dr Max Matias on 6/12/2018 7:24 AM    Xr Chest Portable    Result Date: 6/11/2018  XR CHEST PORTABLE 6/11/2018 12:45 PM HISTORY: Postop COMPARISON: May 29, 2018. FINDINGS: Opacification right lung base is present this is a small infiltrate. This may be atelectasis or an early inflammatory process. Endotracheal tube and nasogastric tubes are satisfactorily positioned. Left lung is clear. . The cardiomediastinal silhouette and pulmonary vascularity are within normal limits. The osseous structures and surrounding soft tissues demonstrate no acute abnormality.     1. Opacification right lung base is noted this may be early inflammatory process or possibly focal atelectasis. 2. Endotracheal tube and nasogastric tubes are satisfactorily positioned. Signed by Dr Huy Calhoun on 6/11/2018 4:31 PM    Ir Tunneled Catheter Placement Greater Than 5 Years    Result Date: 6/12/2018  EXAMINATION: IR TUNNELED CVC PLACE WO SQ PORT/PUMP > 5 YEARS 6/12/2018 2:31 PM HISTORY: Tunneled catheter Single images obtained. Using a a left-sided approach the distal end of the tunnel catheter is positioned in the superior vena cava.  This procedure utilized 1 minute of fluoroscopic time Signed by Dr Huy Calhoun on 6/12/2018 2:33 PM       Assessment   NORY/ATN  Hyperkalemia  Acidosis  AAA s/p open repair  Hypotension requiring pressor agents - resolved  Anemia - acute postop blood loss - US findings reviewed  Low vitamin D  DM2  Ischemic colitis  Paroxysmal Afib    Plan:  HD MWF prn  Reassess for recovery  D/w REGINA Kelly MD  06/26/18  2:29 PM

## 2018-06-26 NOTE — CONSULTS
Consults     Palliative Care Consult Note  6/26/2018 11:09 AM  Subjective:   Admit Date: 6/22/2018  PCP: Duarte Jenkins DO    Reason For Consult:    Goals of Care, Advance Directives      Requesting Physician: Sebastian Chavez    History Obtained From: Patient, electronic medical record    Chief Complaint: Garlan Juba, decreased renal function    History of Present Illness: Patient is a 81 yr old male who was had AAA repair and admitted to ICU post procedure on 6/11/2018. During his procedure, he required several units of PRBC's, platelets, and FFP. He experienced hypotension and also required fluids and pressors. He also had an exploratory laparotomy completed due tp concerns of ischemic bowel and was subsequently treated with IV antibiotics for ischemic colitis. Post operatively, he developed renal failure, creatinine increased from 0.7 pre-op to 2.9 on 6/12/2018 and he had permacath placement and underwent dialysis treatment. There were also concerns about circulation to his left foot, pulses are palpable but weak distally, skin is warm mid foot, but toes are discolored and cool to touch distally. There is concern about long-term viability of the left leg and he will need rehab and strengthening of the left leg. He was moved from ICU to rehab and then readmitted to CCU for an episode of atrial fibrillation during dialysis. He was placed on amio drip and has since converted to NSR, taking amio PO at this time. He had refused further dialysis at one point but further discussion and education gave way to understanding and he has since resumed dialysis. He has since been transferred to regular floor and Palliative Care was consulted to discuss goals of care and AD.     Past Medical History:        Diagnosis Date    Aneurysm ([address]     Arthritis     BPH (benign prostatic hyperplasia)     CAD (coronary artery disease)     Chronic back pain     Diabetes (HCC)     type 1    Erectile dysfunction     Hemod[facility] patient [facility])     History of blood transfusion     Hypertension     Osteoarthritis     Palliative care encounter 06/25/2018       Past Surgical History:        Procedure Laterality Date    ABDOMEN SURGERY      APPENDECTOMY      BACK SURGERY      COLONOSCOPY      HC DIALYSIS CATHETER  6/12/2018    CATHETER INSERTION HEMODIALYSIS performed by Ritesh Arcos MD at [address] [facility] Left     JOINT REPLACEMENT      MASTOID SURGERY Left     NECK SURGERY      PA EXPLORATORY OF ABDOMEN N/A 6/12/2018    LAPAROTOMY EXPLORATORY performed by Jacquie Nguyen MD at [facility] 11 ANEURYSM/GRFT INS,ABD AORT W/VISCER N/A 6/11/2018    OPEN REPAIR OF ABDOMINAL AORTIC ANEURYSM REPAIR WITH [id number] BYPASS WITH CELL SAVER performed by Ritesh Arcos MD at [address] FLX DX W/COLLJ SPEC BR/WA IF PFRMD N/A 6/12/2018    Dr Carpio-Likely ischemic colitis, internal hemorrhoids    TONSILLECTOMY      VASCULAR SURGERY  06/11/2018    DJM. AAA/lliac aneurysm repair, R fem EA/bypass    VASCULAR SURGERY  06/11/2018    DJM. Repair of abdominal aortic aneurysm and bilateral iliac aneurysm with aorta left iliac on the right and right femoral on the left with right common femoral endarterectomy with 18x9 gore-juliocesar bifurcated graft. Allergies:  Patient has no known allergies. Social History:    TOBACCO:   reports that he has been smoking Cigarettes. He has a 25.00 pack-year smoking history. He has never used smokeless tobacco.  ETOH:   reports that he drinks alcohol. DRUGS:   reports that he does not use drugs. Family History:   Family History   Problem Relation Age of Onset    Other Mother     Cancer Father        Palliative Care Pain and Symptom Review:  Pain or Symptom History: Patient denies issues at this time    Palliative Performance Score: 50%    Review of Systems   Constitutional: Positive for malaise/fatigue. Negative for chills and fever. HENT: Negative. Eyes: Negative. Respiratory: Negative for cough, hemoptysis, sputum production and shortness of breath. Cardiovascular: Negative for chest pain, palpitations, orthopnea and PND. Gastrointestinal: Negative for heartburn and nausea. Abdomen tender along surgical incision line, otherwise denies pain   Genitourinary: Negative. Musculoskeletal:        Reports pain in left toes due to decreased circulation   Skin: Negative for itching and rash. Reports discoloration and occasional pain in left toes   Neurological: Positive for weakness. Negative for dizziness and headaches. Psychiatric/Behavioral: Negative. Palliative Review of Advance Directives:   Surrogate Decision Maker: Leeann Mai Power of : none    Advanced Directives/Living Titus: none    Out of hospital medical orders in place to reflect resuscitation status (MOLST/POLST): None  Information Sharing:  Patient's awareness of illness:  [] Terminal [] Life-Threatening [x] Serious [] Non life-threatening [] Not serious   [] Not discussed    Family awareness of illness:   [] Terminal [] Life-Threatening [x] Serious [] Non life-threatening [] Not serious   [] Not discussed    DIET DYSPHAGIA III ADVANCED; Dysphagia III Advanced; Cardiac    Medications:   dextrose       Current Facility-Administered Medications   Medication Dose Route Frequency Provider Last Rate Last Dose    amiodarone (CORDARONE) tablet 200 mg  200 mg Oral BID JESSICA Anthony MD   200 mg at 06/25/18 2051    Followed by   Dwayne Mcpherson ON 7/3/2018] amiodarone (CORDARONE) tablet 200 mg  200 mg Oral Daily JESSICA Anthony MD        loperamide (IMODIUM) capsule 2 mg  2 mg Oral 4x Daily PRN Shea Low DO   2 mg at 06/25/18 5655    amLODIPine (NORVASC) tablet 10 mg  10 mg Oral Daily Lyric Lambert DO   10 mg at 06/24/18 1741    vancomycin (VANCOCIN) intermittent dosing (placeholder)   Other RX Placeholder Shea Low DO        heparin (porcine) left leg    NORY (acute kidney injury) ([address]    ATN (acute tubular necrosis) ([facility])    Leukocytosis    Atrial flutter with rapid ventricular response ([address]  Resolved Problems:    * No resolved hospital problems. *      Recommendations: Discussed goals of care with patient and he is currently going to continue aggressive treatement and dialysis on an outpatient basis as well if necessary. He does plan to return home but may need to stay with his nephew, who lives in [place], temporarily until he is strong enough to live independently again. We discussed outpatient dialysis and patient would like to have dialysis closer to where his nephew lives if he goes home with him but is willing to travel back to [place] for treatment if necessary. We also discussed AD and patient would like his nephew, Alex Miles, to be is HCS and they have discussed his wishes. I encouraged the patient to have a formal documentation of HCS and LW in place due to [place] out of state. Patient was agreeable to having HCS completed and also reviewing LW information and Palliative  was notified for assistance with AD. All questions were answered. Palliative will continue to follow. Thank you for consulting palliative care and allowing us to participate in the care of the patient.       Time Spent Counseling:       approx 35 min                                    CounselingTopics: Goals of care, AD    Total Face to Face Time: approx 60 min    Electronically signed by TORITO Schaffer on 6/26/2018 at 11:09 AM

## 2018-06-26 NOTE — PROGRESS NOTES
Wound Care  Follow up with patient today. Left toes purple to dark purple, toes cool, foot warmer. Left heel floating off the bed with pillow. Patient able to turn to right side, noted left hip unstageable wound measures   8cm x 7cm x <0.1cm, periwound appears to be healing, pink new tissue noted. New sacral shaped mepilex dressing placed to pad and protect area. Orders have been received from Dr. Valdemar Paul for wound photo and Santyl ointment to the area, spoke with Agus Joseph RN patient nurse. [facility] Recommend to continue the pressure ulcer prevention and wound care orders. Discussed with Agus Joseph RN patient nurse today.

## 2018-06-26 NOTE — PROGRESS NOTES
BNP: No results for input(s): BNP in the last 72 hours. PT/INR: No results for input(s): PROTIME, INR in the last 72 hours. APTT:No results for input(s): APTT in the last 72 hours. CARDIAC ENZYMES:No results for input(s): CKTOTAL, CKMB, CKMBINDEX, TROPONINI in the last 72 hours. FASTING LIPID PANEL:  Lab Results   Component Value Date    HDL 45 2018    LDLDIRECT 170 2017    LDLCALC 59 2018    TRIG 153 2018     LIVER PROFILE:  Recent Labs      18   0133  18   0201   AST  31  24   ALT  18  14   LABALBU  2.2*  2.2*       NURSE:  REGINA Alvarezparris Varela : 1945, Male, 81 y.o. History:  Paroxysmal atrial flutter    Nursing note and diagnostics above reviewed and evaluated    [Allergies/Contraindications:  Patient has no known allergies.]     Todays status: remains in sinus rhythm    Physical Examination    Respiratory -  clear. Cardiovascular   Regular without murmur or gallop  Abdominal -  Soft. Bowel sounds present. Nontender. Extremities -  No edema. Assessment/Plan     continue amiodarone    Discussed with patient and nursing      CRocky Meadows MD  [facility]

## 2018-06-26 NOTE — PROGRESS NOTES
Vascular    Seen in dialysis, some concerns with flow through perma cath    Otherwise ok, wounds ok, left leg getting stronger. Cont PT, may need perma cath exchange.     Ever Light

## 2018-06-27 ENCOUNTER — APPOINTMENT (OUTPATIENT)
Dept: GENERAL RADIOLOGY | Age: 73
DRG: 308 | End: 2018-06-27
Attending: INTERNAL MEDICINE
Payer: MEDICARE

## 2018-06-27 LAB
ABO/RH: NORMAL
ANION GAP SERPL CALCULATED.3IONS-SCNC: 16 MMOL/L (ref 7–19)
ANTIBODY SCREEN: NORMAL
BLOOD BANK DISPENSE STATUS: NORMAL
BLOOD BANK PRODUCT CODE: NORMAL
BLOOD CULTURE, ROUTINE: NORMAL
BPU ID: NORMAL
BUN BLDV-MCNC: 37 MG/DL (ref 8–23)
CALCIUM SERPL-MCNC: 7.7 MG/DL (ref 8.8–10.2)
CHLORIDE BLD-SCNC: 95 MMOL/L (ref 98–111)
CO2: 25 MMOL/L (ref 22–29)
CREAT SERPL-MCNC: 7.1 MG/DL (ref 0.5–1.2)
CULTURE, BLOOD 2: NORMAL
DESCRIPTION BLOOD BANK: NORMAL
GFR NON-AFRICAN AMERICAN: 8
GLUCOSE BLD-MCNC: 107 MG/DL (ref 70–99)
GLUCOSE BLD-MCNC: 108 MG/DL (ref 70–99)
GLUCOSE BLD-MCNC: 111 MG/DL (ref 74–109)
HCT VFR BLD CALC: 20.9 % (ref 42–52)
HCT VFR BLD CALC: 26.6 % (ref 42–52)
HEMOGLOBIN: 6.6 G/DL (ref 14–18)
HEMOGLOBIN: 8.7 G/DL (ref 14–18)
MCH RBC QN AUTO: 29.6 PG (ref 27–31)
MCH RBC QN AUTO: 30.2 PG (ref 27–31)
MCHC RBC AUTO-ENTMCNC: 31.6 G/DL (ref 33–37)
MCHC RBC AUTO-ENTMCNC: 32.7 G/DL (ref 33–37)
MCV RBC AUTO: 92.4 FL (ref 80–94)
MCV RBC AUTO: 93.7 FL (ref 80–94)
OCCULT BLOOD QC: ABNORMAL
OCCULT BLOOD SCREENING: ABNORMAL
PDW BLD-RTO: 15.5 % (ref 11.5–14.5)
PDW BLD-RTO: 15.5 % (ref 11.5–14.5)
PERFORMED ON: ABNORMAL
PERFORMED ON: ABNORMAL
PLATELET # BLD: 606 K/UL (ref 130–400)
PLATELET # BLD: 611 K/UL (ref 130–400)
PMV BLD AUTO: 10 FL (ref 9.4–12.4)
PMV BLD AUTO: 9.8 FL (ref 9.4–12.4)
POTASSIUM SERPL-SCNC: 3.9 MMOL/L (ref 3.5–5)
RBC # BLD: 2.23 M/UL (ref 4.7–6.1)
RBC # BLD: 2.88 M/UL (ref 4.7–6.1)
SODIUM BLD-SCNC: 136 MMOL/L (ref 136–145)
VANCOMYCIN RANDOM: 17.9 UG/ML
WBC # BLD: 11.7 K/UL (ref 4.8–10.8)
WBC # BLD: 7.9 K/UL (ref 4.8–10.8)

## 2018-06-27 PROCEDURE — 6370000000 HC RX 637 (ALT 250 FOR IP): Performed by: HOSPITALIST

## 2018-06-27 PROCEDURE — 99222 1ST HOSP IP/OBS MODERATE 55: CPT | Performed by: INTERNAL MEDICINE

## 2018-06-27 PROCEDURE — 1210000000 HC MED SURG R&B

## 2018-06-27 PROCEDURE — 5A1D70Z PERFORMANCE OF URINARY FILTRATION, INTERMITTENT, LESS THAN 6 HOURS PER DAY: ICD-10-PCS | Performed by: INTERNAL MEDICINE

## 2018-06-27 PROCEDURE — C9113 INJ PANTOPRAZOLE SODIUM, VIA: HCPCS | Performed by: HOSPITALIST

## 2018-06-27 PROCEDURE — 99232 SBSQ HOSP IP/OBS MODERATE 35: CPT | Performed by: HOSPITALIST

## 2018-06-27 PROCEDURE — 97530 THERAPEUTIC ACTIVITIES: CPT

## 2018-06-27 PROCEDURE — 80202 ASSAY OF VANCOMYCIN: CPT

## 2018-06-27 PROCEDURE — 99231 SBSQ HOSP IP/OBS SF/LOW 25: CPT | Performed by: INTERNAL MEDICINE

## 2018-06-27 PROCEDURE — 6370000000 HC RX 637 (ALT 250 FOR IP): Performed by: INTERNAL MEDICINE

## 2018-06-27 PROCEDURE — 36415 COLL VENOUS BLD VENIPUNCTURE: CPT

## 2018-06-27 PROCEDURE — G8988 SELF CARE GOAL STATUS: HCPCS

## 2018-06-27 PROCEDURE — 2580000003 HC RX 258: Performed by: HOSPITALIST

## 2018-06-27 PROCEDURE — 92526 ORAL FUNCTION THERAPY: CPT

## 2018-06-27 PROCEDURE — 2580000003 HC RX 258: Performed by: PSYCHIATRY & NEUROLOGY

## 2018-06-27 PROCEDURE — 6370000000 HC RX 637 (ALT 250 FOR IP): Performed by: PHYSICIAN ASSISTANT

## 2018-06-27 PROCEDURE — G8987 SELF CARE CURRENT STATUS: HCPCS

## 2018-06-27 PROCEDURE — 71046 X-RAY EXAM CHEST 2 VIEWS: CPT

## 2018-06-27 PROCEDURE — 82948 REAGENT STRIP/BLOOD GLUCOSE: CPT

## 2018-06-27 PROCEDURE — G0328 FECAL BLOOD SCRN IMMUNOASSAY: HCPCS

## 2018-06-27 PROCEDURE — P9016 RBC LEUKOCYTES REDUCED: HCPCS

## 2018-06-27 PROCEDURE — 36430 TRANSFUSION BLD/BLD COMPNT: CPT

## 2018-06-27 PROCEDURE — G8978 MOBILITY CURRENT STATUS: HCPCS

## 2018-06-27 PROCEDURE — 6360000002 HC RX W HCPCS: Performed by: INTERNAL MEDICINE

## 2018-06-27 PROCEDURE — 97161 PT EVAL LOW COMPLEX 20 MIN: CPT

## 2018-06-27 PROCEDURE — 80048 BASIC METABOLIC PNL TOTAL CA: CPT

## 2018-06-27 PROCEDURE — 97166 OT EVAL MOD COMPLEX 45 MIN: CPT

## 2018-06-27 PROCEDURE — 93005 ELECTROCARDIOGRAM TRACING: CPT

## 2018-06-27 PROCEDURE — 8010000000 HC HEMODIALYSIS ACUTE INPT

## 2018-06-27 PROCEDURE — G8979 MOBILITY GOAL STATUS: HCPCS

## 2018-06-27 PROCEDURE — 6370000000 HC RX 637 (ALT 250 FOR IP): Performed by: PSYCHIATRY & NEUROLOGY

## 2018-06-27 PROCEDURE — 2700000000 HC OXYGEN THERAPY PER DAY

## 2018-06-27 PROCEDURE — 85027 COMPLETE CBC AUTOMATED: CPT

## 2018-06-27 PROCEDURE — 6360000002 HC RX W HCPCS: Performed by: HOSPITALIST

## 2018-06-27 RX ORDER — VANCOMYCIN HYDROCHLORIDE 1 G/200ML
1000 INJECTION, SOLUTION INTRAVENOUS ONCE
Status: COMPLETED | OUTPATIENT
Start: 2018-06-27 | End: 2018-06-27

## 2018-06-27 RX ORDER — 0.9 % SODIUM CHLORIDE 0.9 %
250 INTRAVENOUS SOLUTION INTRAVENOUS ONCE
Status: DISCONTINUED | OUTPATIENT
Start: 2018-06-27 | End: 2018-06-29 | Stop reason: HOSPADM

## 2018-06-27 RX ORDER — HYDRALAZINE HYDROCHLORIDE 10 MG/1
10 TABLET, FILM COATED ORAL EVERY 8 HOURS SCHEDULED
Status: DISCONTINUED | OUTPATIENT
Start: 2018-06-27 | End: 2018-06-29 | Stop reason: HOSPADM

## 2018-06-27 RX ORDER — PANTOPRAZOLE SODIUM 40 MG/10ML
40 INJECTION, POWDER, LYOPHILIZED, FOR SOLUTION INTRAVENOUS 2 TIMES DAILY
Status: DISCONTINUED | OUTPATIENT
Start: 2018-06-27 | End: 2018-06-29 | Stop reason: HOSPADM

## 2018-06-27 RX ADMIN — COLLAGENASE SANTYL: 250 OINTMENT TOPICAL at 13:17

## 2018-06-27 RX ADMIN — TAMSULOSIN HYDROCHLORIDE 0.4 MG: 0.4 CAPSULE ORAL at 13:16

## 2018-06-27 RX ADMIN — MEROPENEM 1 G: 1 INJECTION, POWDER, FOR SOLUTION INTRAVENOUS at 13:17

## 2018-06-27 RX ADMIN — ASPIRIN 81 MG: 81 TABLET, COATED ORAL at 13:16

## 2018-06-27 RX ADMIN — GUAIFENESIN 600 MG: 600 TABLET, EXTENDED RELEASE ORAL at 20:57

## 2018-06-27 RX ADMIN — HYDROCODONE BITARTRATE AND ACETAMINOPHEN 2 TABLET: 5; 325 TABLET ORAL at 20:57

## 2018-06-27 RX ADMIN — CHLORHEXIDINE GLUCONATE 15 ML: 1.2 RINSE ORAL at 21:02

## 2018-06-27 RX ADMIN — MEROPENEM 1 G: 1 INJECTION, POWDER, FOR SOLUTION INTRAVENOUS at 01:01

## 2018-06-27 RX ADMIN — Medication 1 CAPSULE: at 13:16

## 2018-06-27 RX ADMIN — LOPERAMIDE HYDROCHLORIDE 2 MG: 2 CAPSULE ORAL at 01:17

## 2018-06-27 RX ADMIN — AMIODARONE HYDROCHLORIDE 200 MG: 200 TABLET ORAL at 20:59

## 2018-06-27 RX ADMIN — VANCOMYCIN HYDROCHLORIDE 1000 MG: 1 INJECTION, SOLUTION INTRAVENOUS at 20:56

## 2018-06-27 RX ADMIN — METOPROLOL TARTRATE 25 MG: 25 TABLET ORAL at 20:57

## 2018-06-27 RX ADMIN — Medication 10 ML: at 13:17

## 2018-06-27 RX ADMIN — AMLODIPINE BESYLATE 10 MG: 10 TABLET ORAL at 13:16

## 2018-06-27 RX ADMIN — CHLORHEXIDINE GLUCONATE 15 ML: 1.2 RINSE ORAL at 13:18

## 2018-06-27 RX ADMIN — GUAIFENESIN 600 MG: 600 TABLET, EXTENDED RELEASE ORAL at 13:16

## 2018-06-27 RX ADMIN — Medication 1 CAPSULE: at 01:01

## 2018-06-27 RX ADMIN — PANTOPRAZOLE SODIUM 40 MG: 40 INJECTION, POWDER, FOR SOLUTION INTRAVENOUS at 20:57

## 2018-06-27 RX ADMIN — AMIODARONE HYDROCHLORIDE 200 MG: 200 TABLET ORAL at 13:16

## 2018-06-27 RX ADMIN — METOPROLOL TARTRATE 25 MG: 25 TABLET ORAL at 13:16

## 2018-06-27 RX ADMIN — MEROPENEM 1 G: 1 INJECTION, POWDER, FOR SOLUTION INTRAVENOUS at 18:01

## 2018-06-27 RX ADMIN — PANTOPRAZOLE SODIUM 40 MG: 40 TABLET, DELAYED RELEASE ORAL at 06:07

## 2018-06-27 RX ADMIN — Medication 10 ML: at 20:58

## 2018-06-27 ASSESSMENT — PAIN SCALES - GENERAL: PAINLEVEL_OUTOF10: 5

## 2018-06-27 NOTE — CARE COORDINATION
SW visited with Pt in dialysis this morning to further discuss d/c plan. Pt does state that he wants to leave the hospital; and would like to go home if possible. SW discussed his weakness and starting dialysis, etc., and to see if he would consider SNF for short-term rehab. SW also advised that therapy here at the hospital would be looking into seeing him today to see how he feels physically and what his limitations and safety awareness would be at home. He is agreeable to this; SW also discussed what SNF therapy would do with him and also financial coverage. He is agreeable to have SW send referrals to [facility], and [facility]. To start with and check availability. SW also placed additional call to [phone], spoke with [facility]; she indicated they planned to see Pt after he is out of dialysis. Referrals sent.      Electronically signed by VIRGIL Kenyon on 6/27/2018 at 10:38 AM

## 2018-06-27 NOTE — PROGRESS NOTES
Occupational Therapy   Occupational Therapy Initial Assessment  Date: 2018   Patient Name: Day Lawrence  MRN: [id number]     : 1945    Date of Service: 2018    Discharge Recommendations:  Patient would benefit from continued therapy after discharge         Patient Diagnosis(es): There were no encounter diagnoses. has a past medical history of [facility] Arthritis; BPH (benign prostatic hyperplasia); CAD (coronary artery disease); Chronic back pain; Diabetes ([facility] Erectile dysfunction; Hemodialysis patient [facility]); History of blood transfusion; Hypertension; Osteoarthritis; and Palliative care encounter. has a past surgical history that includes Mastoid surgery (Left); Appendectomy; Tonsillectomy; Neck surgery; back surgery; hip surgery (Left); pr repr aneurysm/grft ins,abd aort w/viscer (N/A, 2018); vascular surgery (2018); vascular surgery (2018); pr exploratory of abdomen (N/A, 2018); hc dialysis catheter (2018); pr sigmoidoscopy flx dx w/collj spec br/wa if pfrmd (N/A, 2018); Abdomen surgery; joint replacement; and Colonoscopy. Treatment Diagnosis: AAA repair      Restrictions  Restrictions/Precautions  Restrictions/Precautions: Fall Risk    Subjective   General  Chart Reviewed: Yes  Patient assessed for rehabilitation services?: Yes  Family / Caregiver Present: No  Diagnosis: AAA repair  Subjective  Subjective: Regarding discharge planning, patient states he is collecting information in order to make a decision  General Comment  Comments: Agreeable to therapy and needs to use the restroom.     Pain Assessment  Patient Currently in Pain: No  Pain Assessment: 0-10  Potts-Baker Pain Rating: No hurt  Pain Level: 5  Pain Type: Chronic pain  Pain Location: Foot  Pain Orientation: Left  Pain Descriptors: Aching  Pain Frequency: Continuous  Patient's Stated Pain Goal: No pain  Pain Intervention(s): Medication (see eMar)  Response to Pain Intervention:

## 2018-06-27 NOTE — PROGRESS NOTES
Speech Language Pathology  Facility/Department: [facility] 3 JAQUELINE/VAS/MED  SWALLOW TREAT    NAME: Srinivas Lazo  :   MRN: [id number]    ADMISSION DATE: 2018  ADMITTING DIAGNOSIS: has BPH (benign prostatic hyperplasia); Essential hypertension; Tobacco use; Primary osteoarthritis involving multiple joints; Mixed hyperlipidemia; AAA (abdominal aortic aneurysm) ([address] Type 2 diabetes mellitus with complication, without long-term current use of insulin ([address] Acute blood loss as cause of postoperative anemia; Coagulopathy ([address] Hyperkalemia; Acute renal failure (ARF) ([address] Ischemic colitis ([address] High serum lactate; LFTs abnormal; Anemia; Internal hemorrhoid; Hypocalcemia; S/P AAA repair; Weakness; Gait abnormality; Pain in left leg; SVT (supraventricular tachycardia) ([address] NORY (acute kidney injury) ([address] ATN (acute tubular necrosis) ([address] Leukocytosis; and Atrial flutter with rapid ventricular response ([address] on his problem list.  ONSET DATE: 18    Date of Treat: 2018  Evaluating Therapist: Zak Rod    Current Diet level:  Current Diet : Soft regular  Current Liquid Diet : Thin    Reason for Referral  Srinivas Lazo was referred for a bedside swallow evaluation to assess the efficiency of his swallow function, identify signs and symptoms of aspiration and make recommendations regarding safe dietary consistencies, effective compensatory strategies, and safe eating environment. Impression  Reassessed patient's swallowing function during PM meal. Patient demonstrated prolonged oral prep and decreased rotary jaw movement with soft solid presentations. Minimum residue was noted with soft solids and cleared with additional swallows. Laryngeal elevation was noted to be sluggish and mildly decreased in strength with all consistencies presented. Monitored majority of meal intake to note any possible residue stacking.  No overt S/S of aspiration/ penetration were noted with any consistency. At this time, recommend continuation of current Dysphagia III diet, thin liquids. Meds PO. Treatment Plan  Requires SLP Intervention: Yes    Recommended Diet and Intervention  Diet Solids Recommendation: Dysphagia III Advanced  Liquid Consistency Recommendation: Thin  Recommended Form of Meds: PO  Therapeutic Interventions: Diet tolerance monitoring;Patient/Family education    Compensatory Swallowing Strategies  Compensatory Swallowing Strategies: Alternate solids and liquids;Eat/Feed slowly;Upright as possible for all oral intake;Remain upright for 30-45 minutes after meals;Small bites/sips    General  Behavior/Cognition: Alert; Cooperative;Agitated  Respiratory Status: O2 via nasual cannula  O2 Device: Nasal cannula  Communication Observation: Functional (SLP rated patient's verbal communication as 100% intelligible for unfamiliar listeners.)  Follows Directions: Simple  Dentition: Adequate  Patient Positioning: Upright in bed  Volitional Cough: Congested  Consistencies Administered: Soft solid;Puree; Thin    Reassessed patient's swallowing function with the following observations noted:    Oral Phase Dysfunction  Oral Phase: Exceptions  Oral Phase Dysfunction  Impaired Mastication: Soft Solid (Patient demonstrated prolonged oral prep and decreased rotary jaw movement of soft solid presentations.)  Lingual/Palatal Residue: Soft solid (Minimum residue noted with soft solids; cleared with additional swallows.)  Oral Phase - Comment: Oral transit of soft solid consistency presentations, administered independently, measured 1-2 seconds. Oral transit of puree consistency presentations, administered independently, measured 2 seconds. Oral transit of thin liquid presentations, administered independently via straw, measured 1 second. Indicators of Pharyngeal Phase Dysfunction  Decreased Laryngeal Elevation: Soft Solid;Puree; Thin - straw (Laryngeal elevation observed to be sluggish and mildly

## 2018-06-27 NOTE — PROGRESS NOTES
Petra,    1 ampule at 06/24/18 2332    tamsulosin (FLOMAX) capsule 0.4 mg  0.4 mg Oral Daily Isi Levin PA-C   0.4 mg at 06/26/18 1143     DVT Prophylaxis: Sequential Compression Devices    Continuous Infusions:   dextrose         Intake/Output Summary (Last 24 hours) at 06/27/18 1257  Last data filed at 06/27/18 1119   Gross per 24 hour   Intake              450 ml   Output             2500 ml   Net            -2050 ml     CBC:   Recent Labs      06/26/18   0507  06/26/18   2051  06/27/18   0756   WBC  12.9*  12.4*  7.9   HGB  7.1*  6.9*  6.6*   PLT  610*  653*  611*     BMP:  Recent Labs      06/25/18   0201  06/26/18   0507  06/27/18   0756   NA  131*  135*  136   K  4.5  4.1  3.9   CL  92*  94*  95*   CO2  18*  23  25   BUN  57*  37*  37*   CREATININE  9.2*  6.9*  7.1*   GLUCOSE  118*  106  111*     ABGs:   Lab Results   Component Value Date    PHART 7.420 06/14/2018    PO2ART 65.0 06/14/2018    WSM3ESI 38.0 06/14/2018         Objective:   Vitals: BP (!) 157/57   Pulse 69   Temp 98.3 °F (36.8 °C) (Temporal)   Resp 12   Ht 6' 1\" (1.854 m)   Wt 179 lb 9 oz (81.4 kg)   SpO2 92%   BMI 23.69 kg/m²   General appearance: alert, appears stated age and cooperative  Skin: Skin color, texture, turgor normal.   HEENT: Head: Normocephalic, no lesions, without obvious abnormality.   Neck: no adenopathy, no carotid bruit, no JVD and supple, symmetrical, trachea midline  Lungs: clear to auscultation bilaterally  Heart: regular rate and rhythm, S1, S2 normal, no murmur, click, rub or gallop  Abdomen: soft, non-tender; bowel sounds normal; no masses,  no organomegaly  Extremities: extremities normal, atraumatic, no cyanosis or edema  Lymphatic: No significant lymph node enlargement papable  Neurologic: Mental status: Alert, oriented, thought content appropriate        Assessment & Plan:      SVT (supraventricular tachycardia)/aflutter w/ RVR - s/p DCCV, now in NSR - on oral amio and metoprolol  BPH (benign prostatic hyperplasia) - on flomax  Essential hypertension - uncontrolled -adjust meds  Mixed hyperlipidemia - on statin  AAA (abdominal aortic aneurysm) (HCC) - s/p repair 6/11 by Dr. Jai Elkins  Type 2 diabetes mellitus with complication, without long-term current use of insulin - on insulin   ESRD - on HD, needs an OP chair    Leukocytosis - on merrem and vanc- resolved   HCAP - on ab   Hyponatremia - resolved    Diarrhea - c diff negative, on lactobacillus  Anemia of GI loss- hg less than 7- transfuse one unit today- start ppi iv- GI to evaluate- monitor Hg- hold asa        Needs SNF  Ya Handley MD

## 2018-06-27 NOTE — PROGRESS NOTES
Nephrology ([address] [facility]) Progress Note    Patient:  Carolynn Toney  YOB: 1945  Date of Service: 6/27/2018  MRN: [id number]   Acct: [de-identified]   Primary Care Physician: Moustapha Dominguez DO  Advance Directive: Full Code  Admit Date: 6/22/2018       Hospital Day: 5  Referring Provider: Humberto Colón, *    Patient independently seen and examined, Chart, Consults, Notes, Operative notes, Labs, Cardiology, and Radiology studies reviewed as able. Subjective:  Carolynn Toney is a 81 y.o. male  whom we were consulted for NORY. History was originally obtained from chart and nursing staff as patient is currently intubated and no family members are present or able to be reached via phone.  Mr. Pablo Robertson underwent AAA repair on 6/11/18. During the procedure he required several units of PRBCs, Platelets, and FFP.   He became hypotensive and also required multiple boluses as well as pressors.  Over the course of the night he became anuric.   His creatinine on 5/29/18 was 0.7 and 1.1 on 6/11/18. Routine chemistries were obtained this am and his creatinine is now up to to 2.9. Carmen Green the constellation of findings we were asked to see him. Mardevora Joyce with findings of ischemic bowel via scope that required ex lap for eval without colectomy. No known prior renal disease. Was transferred to rehab, but returned to CCU for AFib. Transferred out to the floor again 6/25. Today, no new events. Agreeable to HD for now. Limited cath flow for access. Questions about outpt HD, still unsure of dispo. Dialysis   Pt was seen on RRT  Modality: Hemodialysis  Access: Catheter  Location: left upper  QB: 350  QD: 600  UF: 1000              Allergies:  Patient has no known allergies.     Medicines:  Current Facility-Administered Medications   Medication Dose Route Frequency Provider Last Rate Last Dose    pantoprazole (PROTONIX) injection 40 mg  40 mg Intravenous BID Denver Leobardo shortness of breath, wheezing  Cardiovascular ROS: no chest pain or dyspnea on exertion  Gastrointestinal ROS: No abdominal pain or melena  Genito-Urinary ROS: No dysuria or hematuria  Musculoskeletal ROS: No joint pain or swelling             Objective:  Blood pressure 131/64, pulse 65, temperature 98 °F (36.7 °C), temperature source Temporal, resp. rate 18, height 6' 1\" (1.854 m), weight 179 lb 9 oz (81.4 kg), SpO2 94 %. Intake/Output Summary (Last 24 hours) at 06/27/18 0938  Last data filed at 06/27/18 0454   Gross per 24 hour   Intake              100 ml   Output                0 ml   Net              100 ml     General: awake/alert  Chest:  clear to auscultation bilaterally without respiratory distress  CVS: regular rate and rhythm  Abdominal: soft, nontender, normal bowel sounds  Extremities: tr ble edema  Skin: no rash, left foot ischemic toe changes    Labs:  BMP:   Recent Labs      06/25/18   0201  06/26/18   0507  06/27/18   0756   NA  131*  135*  136   K  4.5  4.1  3.9   CL  92*  94*  95*   CO2  18*  23  25   BUN  57*  37*  37*   CREATININE  9.2*  6.9*  7.1*   CALCIUM  7.7*  7.8*  7.7*     CBC:   Recent Labs      06/26/18   0507  06/26/18   2051 06/27/18   0756   WBC  12.9*  12.4*  7.9   HGB  7.1*  6.9*  6.6*   HCT  22.4*  21.6*  20.9*   MCV  93.3  93.9  93.7   PLT  610*  653*  611*     LIVER PROFILE:   Recent Labs      06/25/18   0201   AST  24   ALT  14   BILITOT  0.3   ALKPHOS  81     PT/INR:   No results for input(s): PROTIME, INR in the last 72 hours. APTT: No results for input(s): APTT in the last 72 hours. BNP:  No results for input(s): BNP in the last 72 hours. Ionized Calcium:No results for input(s): IONCA in the last 72 hours. Magnesium:  Recent Labs      06/25/18   0201   MG  2.4     Phosphorus:No results for input(s): PHOS in the last 72 hours. HgbA1C:   No results for input(s): LABA1C in the last 72 hours.   Hepatic:   Recent Labs      06/25/18   0201   ALKPHOS  81   ALT  14   AST  24 PROT  5.1*   BILITOT  0.3   LABALBU  2.2*     Lactic Acid:   No results for input(s): LACTA in the last 72 hours. Troponin: No results for input(s): CKTOTAL, CKMB, TROPONINT in the last 72 hours. ABGs:   Lab Results   Component Value Date    PHART 7.420 06/14/2018    PO2ART 65.0 06/14/2018    WDN2ZHY 38.0 06/14/2018     CRP:  No results for input(s): CRP in the last 72 hours. Sed Rate:  No results for input(s): SEDRATE in the last 72 hours. Culture Results:   Blood Culture Recent:   Recent Labs      06/22/18   1444   BC  No Growth to date. Any change in status will be called. Urine Culture Recent : No results for input(s): LABURIN in the last 720 hours. Radiology reports as per the Radiologist  Radiology: Xr Chest Standard (2 Vw)    Result Date: 5/29/2018  XR CHEST (2 VW) 5/29/2018 12:30 PM Two-view chest: History: Hypertension Frontal and lateral projection chest radiograph obtained. Comparison:  none Findings: There are COPD changes. The lungs are clear without acute infiltrates. The heart is normal in size, pulmonary circulation appropriate, without heart failure. There are scoliotic and degenerative changes diffusely in the thoracolumbar spine with bridging osteophyte formation. .                                                                                    Impression: 1. COPD changes. 2. No acute cardiopulmonary process. Signed by Dr Farhad Garza on 5/29/2018 1:53 PM    Us Renal Complete    Result Date: 6/12/2018  History: 82-year-old evaluated for acute renal failure. Reference: CT abdomen pelvis April 23, 2018. Findings: Renal ultrasound was performed. Both kidneys are normal in size, contour, and cortical thickness/echogenicity. Renal cortical thickness is 1.5-2 cm throughout. No hydronephrosis. No renal cyst or mass. There is a tiny cyst from the posterior left renal cortex on recent CT which is likely too small for detection by ultrasound. Urinary bladder not well assessed.  There is a moderate amount of free fluid in the abdomen and pelvis which appears complex, possibly hemorrhage. The right kidney measures 10.2 cm in length. The left kidney measures 12.2 cm in length. 1. Moderate amount of complex free fluid in the abdomen and pelvis, likely hemorrhage/hematoma. Patient had open abdominal aortic/iliac aneurysm repair yesterday and is presumably related. 2. Sonographically normal kidneys. Discussed with Dr. Marco Grady at time of dictation. Signed by Dr Hieu Saunders on 6/12/2018 9:18 AM    Xr Chest Portable    Result Date: 6/13/2018  XR CHEST PORTABLE 6/13/2018 4:00 AM HISTORY: On ventilator COMPARISON: June 12, 2018. FINDINGS: The lungs are clear. Left internal jugular double lumen catheter is present satisfactorily positioned. Endotracheal tube nasogastric tube are satisfactorily positioned. . The cardiomediastinal silhouette and pulmonary vascularity are within normal limits. The osseous structures and surrounding soft tissues demonstrate no acute abnormality. 1. No radiographic evidence of acute cardiopulmonary process. Signed by Dr Karina Bailey on 6/13/2018 7:47 AM    Xr Chest Portable    Result Date: 6/12/2018  XR CHEST PORTABLE 6/12/2018 4:30 AM HISTORY: Respiratory failure COMPARISON: June 11, 2018. FINDINGS: The lungs are clear. Skinfold projects over the right chest Cardiac silhouettes normal. Endotracheal tube is present. Nasogastric tube is present. . The osseous structures and surrounding soft tissues demonstrate no acute abnormality. 1. No radiographic evidence of acute cardiopulmonary process. Signed by Dr Karina Bailey on 6/12/2018 7:24 AM    Xr Chest Portable    Result Date: 6/11/2018  XR CHEST PORTABLE 6/11/2018 12:45 PM HISTORY: Postop COMPARISON: May 29, 2018. FINDINGS: Opacification right lung base is present this is a small infiltrate. This may be atelectasis or an early inflammatory process. Endotracheal tube and nasogastric tubes are satisfactorily positioned.  Left lung is clear. . The cardiomediastinal silhouette and pulmonary vascularity are within normal limits. The osseous structures and surrounding soft tissues demonstrate no acute abnormality. 1. Opacification right lung base is noted this may be early inflammatory process or possibly focal atelectasis. 2. Endotracheal tube and nasogastric tubes are satisfactorily positioned. Signed by Dr Abbie Gacria on 6/11/2018 4:31 PM    Ir Tunneled Catheter Placement Greater Than 5 Years    Result Date: 6/12/2018  EXAMINATION: IR TUNNELED CVC PLACE WO SQ PORT/PUMP > 5 YEARS 6/12/2018 2:31 PM HISTORY: Tunneled catheter Single images obtained. Using a a left-sided approach the distal end of the tunnel catheter is positioned in the superior vena cava.  This procedure utilized 1 minute of fluoroscopic time Signed by Dr Abbie Garcia on 6/12/2018 2:33 PM       Assessment   NORY/ATN  Hyperkalemia  Acidosis  AAA s/p open repair  Hypotension requiring pressor agents - resolved  Anemia - acute postop blood loss - US findings reviewed  Low vitamin D  DM2  Ischemic colitis  Paroxysmal Afib    Plan:  HD MWF prn  Reassess for recovery  D/w RN  Ok for acute placement      Katie Sequeira MD  06/27/18  9:38 AM

## 2018-06-27 NOTE — PROGRESS NOTES
611*     BMP: Recent Labs      18   0507  18   0756   NA  135*  136   K  4.1  3.9   CO2  23  25   BUN  37*  37*   CREATININE  6.9*  7.1*   LABGLOM  8*  8*   GLUCOSE  106  111*     BNP: No results for input(s): BNP in the last 72 hours. PT/INR: No results for input(s): PROTIME, INR in the last 72 hours. APTT:No results for input(s): APTT in the last 72 hours. CARDIAC ENZYMES:No results for input(s): CKTOTAL, CKMB, CKMBINDEX, TROPONINI in the last 72 hours. FASTING LIPID PANEL:  Lab Results   Component Value Date    HDL 45 2018    LDLDIRECT 170 2017    LDLCALC 59 2018    TRIG 153 2018     LIVER PROFILE:  Recent Labs      18   0201   AST  24   ALT  14   LABALBU  2.2*       NURSE:  REGINA Elliott : 1945, Male, 81 y.o. History:  Paroxysmal atrial flutter    Nursing note and diagnostics above reviewed and evaluated    [Allergies/Contraindications:  Patient has no known allergies.]     Todays status: remains in sinus rhythm    Physical Examination    Respiratory -  Decreased breath sounds. Cardiovascular   Regular rhythm  Abdominal -  Soft. Bowel sounds present. Nontender. Extremities -  Pulses intact. Assessment/Plan     continue amiodarone    Discussed with patient and nursing      JESSICA West MD  [facility]

## 2018-06-28 ENCOUNTER — APPOINTMENT (OUTPATIENT)
Dept: GENERAL RADIOLOGY | Age: 73
DRG: 308 | End: 2018-06-28
Attending: INTERNAL MEDICINE
Payer: MEDICARE

## 2018-06-28 LAB
ANION GAP SERPL CALCULATED.3IONS-SCNC: 16 MMOL/L (ref 7–19)
BUN BLDV-MCNC: 30 MG/DL (ref 8–23)
CALCIUM SERPL-MCNC: 7.8 MG/DL (ref 8.8–10.2)
CHLORIDE BLD-SCNC: 95 MMOL/L (ref 98–111)
CO2: 23 MMOL/L (ref 22–29)
CREAT SERPL-MCNC: 6.4 MG/DL (ref 0.5–1.2)
EKG P AXIS: 58 DEGREES
EKG P AXIS: 65 DEGREES
EKG P AXIS: 8 DEGREES
EKG P-R INTERVAL: 152 MS
EKG P-R INTERVAL: 164 MS
EKG P-R INTERVAL: 172 MS
EKG Q-T INTERVAL: 318 MS
EKG Q-T INTERVAL: 404 MS
EKG Q-T INTERVAL: 412 MS
EKG QRS DURATION: 112 MS
EKG QRS DURATION: 116 MS
EKG QRS DURATION: 84 MS
EKG QTC CALCULATION (BAZETT): 393 MS
EKG QTC CALCULATION (BAZETT): 412 MS
EKG QTC CALCULATION (BAZETT): 422 MS
EKG T AXIS: 48 DEGREES
EKG T AXIS: 50 DEGREES
EKG T AXIS: 64 DEGREES
GFR NON-AFRICAN AMERICAN: 9
GLUCOSE BLD-MCNC: 104 MG/DL (ref 74–109)
GLUCOSE BLD-MCNC: 118 MG/DL (ref 70–99)
GLUCOSE BLD-MCNC: 123 MG/DL (ref 70–99)
GLUCOSE BLD-MCNC: 124 MG/DL (ref 70–99)
GLUCOSE BLD-MCNC: 135 MG/DL (ref 70–99)
GLUCOSE BLD-MCNC: 137 MG/DL (ref 70–99)
HCT VFR BLD CALC: 25.9 % (ref 42–52)
HCT VFR BLD CALC: 26.3 % (ref 42–52)
HCT VFR BLD CALC: 26.9 % (ref 42–52)
HEMOGLOBIN: 8.1 G/DL (ref 14–18)
HEMOGLOBIN: 8.3 G/DL (ref 14–18)
HEMOGLOBIN: 8.3 G/DL (ref 14–18)
MCH RBC QN AUTO: 29.6 PG (ref 27–31)
MCH RBC QN AUTO: 29.7 PG (ref 27–31)
MCH RBC QN AUTO: 29.9 PG (ref 27–31)
MCHC RBC AUTO-ENTMCNC: 30.1 G/DL (ref 33–37)
MCHC RBC AUTO-ENTMCNC: 31.6 G/DL (ref 33–37)
MCHC RBC AUTO-ENTMCNC: 32 G/DL (ref 33–37)
MCV RBC AUTO: 92.5 FL (ref 80–94)
MCV RBC AUTO: 94.6 FL (ref 80–94)
MCV RBC AUTO: 98.5 FL (ref 80–94)
PDW BLD-RTO: 15.7 % (ref 11.5–14.5)
PDW BLD-RTO: 15.9 % (ref 11.5–14.5)
PDW BLD-RTO: 16.1 % (ref 11.5–14.5)
PERFORMED ON: ABNORMAL
PLATELET # BLD: 580 K/UL (ref 130–400)
PLATELET # BLD: 599 K/UL (ref 130–400)
PLATELET # BLD: 665 K/UL (ref 130–400)
PMV BLD AUTO: 10 FL (ref 9.4–12.4)
PMV BLD AUTO: 10.1 FL (ref 9.4–12.4)
PMV BLD AUTO: 10.3 FL (ref 9.4–12.4)
POTASSIUM SERPL-SCNC: 3.9 MMOL/L (ref 3.5–5)
RBC # BLD: 2.73 M/UL (ref 4.7–6.1)
RBC # BLD: 2.78 M/UL (ref 4.7–6.1)
RBC # BLD: 2.8 M/UL (ref 4.7–6.1)
SODIUM BLD-SCNC: 134 MMOL/L (ref 136–145)
WBC # BLD: 10 K/UL (ref 4.8–10.8)
WBC # BLD: 11.2 K/UL (ref 4.8–10.8)
WBC # BLD: 12.1 K/UL (ref 4.8–10.8)

## 2018-06-28 PROCEDURE — 6370000000 HC RX 637 (ALT 250 FOR IP): Performed by: PSYCHIATRY & NEUROLOGY

## 2018-06-28 PROCEDURE — 2580000003 HC RX 258: Performed by: PSYCHIATRY & NEUROLOGY

## 2018-06-28 PROCEDURE — 92526 ORAL FUNCTION THERAPY: CPT

## 2018-06-28 PROCEDURE — G8996 SWALLOW CURRENT STATUS: HCPCS

## 2018-06-28 PROCEDURE — 93005 ELECTROCARDIOGRAM TRACING: CPT

## 2018-06-28 PROCEDURE — 97530 THERAPEUTIC ACTIVITIES: CPT

## 2018-06-28 PROCEDURE — G8997 SWALLOW GOAL STATUS: HCPCS

## 2018-06-28 PROCEDURE — 6370000000 HC RX 637 (ALT 250 FOR IP): Performed by: PHYSICIAN ASSISTANT

## 2018-06-28 PROCEDURE — 1210000000 HC MED SURG R&B

## 2018-06-28 PROCEDURE — 6370000000 HC RX 637 (ALT 250 FOR IP): Performed by: INTERNAL MEDICINE

## 2018-06-28 PROCEDURE — 80048 BASIC METABOLIC PNL TOTAL CA: CPT

## 2018-06-28 PROCEDURE — 51798 US URINE CAPACITY MEASURE: CPT

## 2018-06-28 PROCEDURE — 99024 POSTOP FOLLOW-UP VISIT: CPT | Performed by: SURGERY

## 2018-06-28 PROCEDURE — 2580000003 HC RX 258: Performed by: HOSPITALIST

## 2018-06-28 PROCEDURE — 6360000002 HC RX W HCPCS: Performed by: HOSPITALIST

## 2018-06-28 PROCEDURE — 2700000000 HC OXYGEN THERAPY PER DAY

## 2018-06-28 PROCEDURE — 82948 REAGENT STRIP/BLOOD GLUCOSE: CPT

## 2018-06-28 PROCEDURE — 71046 X-RAY EXAM CHEST 2 VIEWS: CPT

## 2018-06-28 PROCEDURE — 85027 COMPLETE CBC AUTOMATED: CPT

## 2018-06-28 PROCEDURE — 99232 SBSQ HOSP IP/OBS MODERATE 35: CPT | Performed by: INTERNAL MEDICINE

## 2018-06-28 PROCEDURE — 6370000000 HC RX 637 (ALT 250 FOR IP): Performed by: HOSPITALIST

## 2018-06-28 PROCEDURE — 99232 SBSQ HOSP IP/OBS MODERATE 35: CPT | Performed by: HOSPITALIST

## 2018-06-28 PROCEDURE — C9113 INJ PANTOPRAZOLE SODIUM, VIA: HCPCS | Performed by: HOSPITALIST

## 2018-06-28 PROCEDURE — 36415 COLL VENOUS BLD VENIPUNCTURE: CPT

## 2018-06-28 RX ORDER — METRONIDAZOLE 500 MG/1
500 TABLET ORAL EVERY 8 HOURS SCHEDULED
Status: DISCONTINUED | OUTPATIENT
Start: 2018-06-28 | End: 2018-06-29 | Stop reason: HOSPADM

## 2018-06-28 RX ORDER — AMIODARONE HYDROCHLORIDE 200 MG/1
200 TABLET ORAL DAILY
Status: DISCONTINUED | OUTPATIENT
Start: 2018-06-29 | End: 2018-06-29 | Stop reason: HOSPADM

## 2018-06-28 RX ADMIN — AMLODIPINE BESYLATE 10 MG: 10 TABLET ORAL at 08:57

## 2018-06-28 RX ADMIN — MEROPENEM 1 G: 1 INJECTION, POWDER, FOR SOLUTION INTRAVENOUS at 08:57

## 2018-06-28 RX ADMIN — HYDROCODONE BITARTRATE AND ACETAMINOPHEN 2 TABLET: 5; 325 TABLET ORAL at 23:40

## 2018-06-28 RX ADMIN — GUAIFENESIN 600 MG: 600 TABLET, EXTENDED RELEASE ORAL at 08:56

## 2018-06-28 RX ADMIN — PANTOPRAZOLE SODIUM 40 MG: 40 INJECTION, POWDER, FOR SOLUTION INTRAVENOUS at 08:57

## 2018-06-28 RX ADMIN — METRONIDAZOLE 500 MG: 500 TABLET ORAL at 19:07

## 2018-06-28 RX ADMIN — Medication 10 ML: at 08:57

## 2018-06-28 RX ADMIN — CHLORHEXIDINE GLUCONATE 15 ML: 1.2 RINSE ORAL at 09:06

## 2018-06-28 RX ADMIN — HYDROCODONE BITARTRATE AND ACETAMINOPHEN 1 TABLET: 5; 325 TABLET ORAL at 14:34

## 2018-06-28 RX ADMIN — COLLAGENASE SANTYL: 250 OINTMENT TOPICAL at 09:06

## 2018-06-28 RX ADMIN — HYDRALAZINE HYDROCHLORIDE 10 MG: 10 TABLET, FILM COATED ORAL at 22:38

## 2018-06-28 RX ADMIN — METRONIDAZOLE 500 MG: 500 TABLET ORAL at 22:38

## 2018-06-28 RX ADMIN — HYDRALAZINE HYDROCHLORIDE 10 MG: 10 TABLET, FILM COATED ORAL at 14:28

## 2018-06-28 RX ADMIN — GUAIFENESIN 600 MG: 600 TABLET, EXTENDED RELEASE ORAL at 22:40

## 2018-06-28 RX ADMIN — HYDRALAZINE HYDROCHLORIDE 10 MG: 10 TABLET, FILM COATED ORAL at 08:56

## 2018-06-28 RX ADMIN — PANTOPRAZOLE SODIUM 40 MG: 40 INJECTION, POWDER, FOR SOLUTION INTRAVENOUS at 22:41

## 2018-06-28 RX ADMIN — CHLORHEXIDINE GLUCONATE 15 ML: 1.2 RINSE ORAL at 22:38

## 2018-06-28 RX ADMIN — TAMSULOSIN HYDROCHLORIDE 0.4 MG: 0.4 CAPSULE ORAL at 08:56

## 2018-06-28 RX ADMIN — Medication 1 CAPSULE: at 08:57

## 2018-06-28 RX ADMIN — MEROPENEM 1 G: 1 INJECTION, POWDER, FOR SOLUTION INTRAVENOUS at 02:27

## 2018-06-28 RX ADMIN — AMIODARONE HYDROCHLORIDE 200 MG: 200 TABLET ORAL at 08:56

## 2018-06-28 ASSESSMENT — PAIN DESCRIPTION - LOCATION: LOCATION: FOOT

## 2018-06-28 ASSESSMENT — PAIN DESCRIPTION - PAIN TYPE: TYPE: CHRONIC PAIN

## 2018-06-28 ASSESSMENT — PAIN SCALES - GENERAL
PAINLEVEL_OUTOF10: 6
PAINLEVEL_OUTOF10: 3
PAINLEVEL_OUTOF10: 6

## 2018-06-28 ASSESSMENT — PAIN DESCRIPTION - DESCRIPTORS: DESCRIPTORS: ACHING

## 2018-06-28 ASSESSMENT — PAIN DESCRIPTION - FREQUENCY: FREQUENCY: CONTINUOUS

## 2018-06-28 ASSESSMENT — PAIN DESCRIPTION - ORIENTATION: ORIENTATION: LEFT

## 2018-06-28 NOTE — PROGRESS NOTES
statin  AAA (abdominal aortic aneurysm) (HCC) - s/p repair 6/11 by Dr. Coretta De Leon  Type 2 diabetes mellitus with complication, without long-term current use of insulin - on insulin   ESRD - on HD, needs an OP chair    Leukocytosis - on merrem and vanc  Colitis - add flagyl   HCAP - on ab   Hyponatremia - resolved   Anemia requiring transfusion- monitor   Heme + stool- no intervention per GI for now - cont PPI      Disposition: SNF today if bed available     Cornell Gamino

## 2018-06-28 NOTE — PROGRESS NOTES
Speech Language Pathology  Facility/Department: [facility] 3 JAQUELINE/VAS/MED  TREAT SWALLOW EVALUATION    NAME: Anshul Leyva  : [birth year]  MRN: [id number]    ADMISSION DATE: 2018  ADMITTING DIAGNOSIS: has BPH (benign prostatic hyperplasia); Essential hypertension; Tobacco use; Primary osteoarthritis involving multiple joints; Mixed hyperlipidemia; AAA (abdominal aortic aneurysm) ([address] Type 2 diabetes mellitus with complication, without long-term current use of insulin ([address] Acute blood loss anemia; Coagulopathy ([address] Hyperkalemia; Acute renal failure (ARF) ([address] Ischemic colitis ([address] High serum lactate; LFTs abnormal; Anemia; Internal hemorrhoid; Hypocalcemia; S/P AAA repair; Weakness; Gait abnormality; Pain in left leg; SVT (supraventricular tachycardia) ([address] NORY (acute kidney injury) ([address] ATN (acute tubular necrosis) ([address] Leukocytosis; Atrial flutter with rapid ventricular response ([address] and Heme + stool on his problem list.  ONSET DATE: 18    Date of Eval: 2018  Evaluating Therapist: Yusef Ny    Current Diet level:  Current Diet : Soft regular  Current Liquid Diet : Thin    Reason for Referral  Anshul Leyva was referred for a bedside swallow evaluation to assess the efficiency of his swallow function, identify signs and symptoms of aspiration and make recommendations regarding safe dietary consistencies, effective compensatory strategies, and safe eating environment. Impression  Observed patient's swallowing function. Patient exhibited prolonged oral prep with soft solid consistency presentations. Laryngeal elevation observed to be mildly decreased in strength with soft solid consistency and thin liquid presentation. However, patient exhibited no outward S/S of aspiration/penetration. At this time recommend continuation of Dysphagia III diet and thin liquids. Meds PO. No further speech services warranted at this time. Patient to be D/C.      Treatment Plan  Requires

## 2018-06-28 NOTE — PROGRESS NOTES
06/28/18 1226   Restrictions/Precautions   Restrictions/Precautions Fall Risk   Required Braces or Orthoses? No   General   Chart Reviewed Yes   Family / Caregiver Present No   Subjective   Subjective Patient agrees to go to chair   Pain Screening   Patient Currently in Pain Yes   Pain Assessment   Pain Assessment 0-10   Pain Level 6   Pain Type Chronic pain   Pain Location Foot   Pain Orientation Left   Pain Descriptors Aching   Pain Frequency Continuous   Vital Signs   Level of Consciousness 0   Oxygen Therapy   O2 Device None (Room air)   Orientation   Overall Orientation Status WNL   Bed Mobility   Supine to Sit Stand by assistance   Transfers   Sit to Stand Moderate Assistance   Stand to sit Minimal Assistance   Bed to Chair Minimal assistance   Comment unsteady   Ambulation   Ambulation? Yes   WB Status WBAT   Ambulation 1   Device Rolling Walker   Assistance Minimal assistance   Quality of Gait Short steps to chair   Distance 6'   Comments Unsteady   Balance   Posture Fair   Sitting - Static Good   Sitting - Dynamic Good;-   Standing - Static Fair;-   Standing - Dynamic Fair   Exercises   Comments TX reqired extra time as patient took  phone call   Short term goals   Time Frame for Short term goals 14 DAYS   Short term goal 1 BED MOB MOD IND   Short term goal 2 TRANSFERS CGA   Short term goal 3 ' RW CGA   Short term goal 5 Min A HEP   Conditions Requiring Skilled Therapeutic Intervention   Body structures, Functions, Activity limitations Decreased functional mobility ; Decreased endurance;Decreased balance;Decreased ROM; Decreased strength;Decreased safe awareness   Activity Tolerance   Activity Tolerance Patient Tolerated treatment well;Patient limited by pain   Safety Devices   Type of devices Left in chair;Call light within reach   Physical Therapy    Electronically signed by Tex Lazo PTA on 6/28/2018 at 12:33 PM

## 2018-06-28 NOTE — CONSULTS
[facility] [employer] [facility] NEHAL Michelle 80, [address] [facility]                                   CONSULTATION    PATIENT NAME: Erick Headley                 :        1945  MED REC NO:   [id number]                              ROOM:       [facility]  ACCOUNT NO:   [de-identified]                           ADMIT DATE: 2018  PROVIDER:     Fred Rocha DO    CONSULT DATE:  2018    HISTORY OF PRESENT ILLNESS:  The patient is a 33-year-old white male  familiar to me from recent evaluation 2-3 weeks ago after aortic aneurysm  repair. He underwent sigmoidoscopy and was found to have severe colitis up  to the 20 cm sp. I did not pass the endoscope past this severe  perforation. He went back to surgery and did not have any obvious ischemic  bowel at the time of laparotomy but he has been having significant anemia  with diarrhea. He denies any visually black or bloody stools but he was  found to have blood in the stool by Hemoccult. I had a C. diff performed 5  days ago which was normal.  He admits just a mild abdominal pain. He  states that any type of food ingestion gives him diarrhea. His latest  hemoglobin was 8.7 which was up from 6.6 via transfusion. His white count  is 11.7, platelets are 405. Past medical, past surgical, social history, allergies, family history,  medications, laboratory studies, x-ray studies, vital signs were reviewed  and are available on Epic. REVIEW OF SYSTEMS:  CONSTITUTIONAL:  Denies night sweats or appetite loss. EYES:  Denies eye pain or double vision. ENT:  Denies frequent nose bleeds or tinnitus. CARDIOVASCULAR:  There is a history of SVT and atrial flutter. RESPIRATORY:  Denies hemoptysis or shortness of breath. GASTROINTESTINAL:  Denies abdominal ischemia or vomiting blood. GENITOURINARY:  Denies dysuria or pyuria. MUSCULOSKELETAL:  Denies arthritis or movement difficulties.   INTEGUMENTARY:  Denies pruritus or easy bruising. NEUROLOGICAL:  Denies total loss of taste or numbness. PSYCHIATRIC:  Denies episodic change in personality or expansive  personality. ENDOCRINE:  He has a history of diabetes. He denies adrenal insufficiency. HEMATOLOGIC/LYMPHATIC:  Denies swollen lymph glands or hemophilia. ALLERGIC/IMMUNOLOGIC:  Denies anaphylaxis or loss of immune function. PHYSICAL EXAMINATION:  VITAL SIGNS:  Temperature is 98.3, respirations 12, pulse 69, blood  pressure 157/57. HEENT:  PERRL. Extraocular muscles intact. Sclerae not icteric. NECK:  Without masses. Trachea midline and moveable. No crepitus. HEART:  RRR. No heaves or thrills. LUNGS:  Normal excursion. No auditory wheezes or accessory muscle use. ABDOMEN:  He has some mild diffuse tenderness with recent aneurysm repair. There is no guarding, rebound, rigidity, organomegaly, mass, obvious  ascites or incarcerated hernia. LYMPHATICS:  No enlarged nodes in the neck, groin or umbilicus. SKIN:  No systemic rashes or large areas of ecchymosis. No spider angioma. MUSCULOSKELETAL:  Nails show no clubbing or cyanosis. No cogwheel  rigidity. NEURO:  No new focal deficits. No new loss of sensation. PSYCHIATRIC:  No excessive depression. Judgment and insight appear  appropriate. ASSESSMENT:  1. Acute blood loss anemia. 2.  Recent colitis by sigmoidoscopy. 3.  Heme-positive stools. 4.  Status post an abdominal aortic aneurysm with complication. 5.  Dysphagia. 6.  End-stage renal disease. PLAN:  1.  I would not pursue colonoscopy at this time in view of his recent  significant colitis. Recheck stool for C. diff and also would suggest a CT  scan of the abdomen and pelvis without IV contrast but with oral contrast  in view of the obvious colitis on sigmoidoscopy 2 weeks ago. 2.  Keep hemoglobin over 7 via transfusions. Thank you for allowing me to participate in his care.           JOSE GUADALUPE BROWN DO    D: 06/27/2018 18:24:21      T: 06/27/2018 18:28:44     [email or web address]  Job#: [id number]     Doc#: [id number]    CC:

## 2018-06-28 NOTE — PROGRESS NOTES
40 mg at 06/28/18 0857    0.9 % sodium chloride bolus  250 mL Intravenous Once Beck Snider MD        hydrALAZINE (APRESOLINE) tablet 10 mg  10 mg Oral 3 times per day Beck Snider MD   10 mg at 06/28/18 3577    collagenase ointment   Topical Daily Marii Recinos MD        lactobacillus (CULTURELLE) capsule 1 capsule  1 capsule Oral Daily Beck Snider MD   1 capsule at 06/28/18 0857    guaiFENesin [facility]) extended release tablet 600 mg  600 mg Oral BID Beck Snider MD   600 mg at 06/28/18 0856    meropenem (MERREM) 1 g in sodium chloride 0.9 % 100 mL IVPB (mini-bag)  1 g Intravenous Q8H Beck Snider MD   Stopped at 06/28/18 1760    loperamide (IMODIUM) capsule 2 mg  2 mg Oral 4x Daily PRN Aubrey Bishop DO   2 mg at 06/27/18 0117    amLODIPine (NORVASC) tablet 10 mg  10 mg Oral Daily Fam Lambert DO   10 mg at 06/28/18 1620    vancomycin (VANCOCIN) intermittent dosing (placeholder)   Other RX Placeholder Fam Lambert DO        heparin (porcine) injection 2,000 Units  2,000 Units Intercatheter PRN Shonda Harvey MD        chlorhexidine (PERIDEX) 0.12 % solution 15 mL  15 mL Mouth/Throat BID Shonda Harvey MD   15 mL at 06/28/18 0906    dextrose 5 % solution  100 mL/hr Intravenous PRN Shonda Harvey MD        dextrose 50 % solution 12.5 g  12.5 g Intravenous PRN Shonda Harvey MD        glucagon (rDNA) injection 1 mg  1 mg Intramuscular PRN Shonda Harvey MD        glucose (GLUTOSE) 40 % oral gel 15 g  15 g Oral PRN Shonda Harvey MD        acetaminophen (TYLENOL) tablet 650 mg  650 mg Oral Q4H PRN Shonda Harvey MD        bisacodyl (DULCOLAX) suppository 10 mg  10 mg Rectal Daily PRN Shonda Harvey MD        docusate sodium (COLACE) capsule 100 mg  100 mg Oral BID PRN Sohnda Harvey MD        magnesium hydroxide (MILK OF MAGNESIA) 400 MG/5ML suspension 30 mL  30 mL Oral Daily PRN Shonda Harvey MD        aspirin EC tablet 81 mg performed by Naga Sánchez MD at [address] [facility] Left     JOINT REPLACEMENT      MASTOID SURGERY Left     NECK SURGERY      AR EXPLORATORY OF ABDOMEN N/A 6/12/2018    LAPAROTOMY EXPLORATORY performed by Alexandru Grossman MD at [facility] ANEURYSM/GRFT INS,ABD AORT W/VISCER N/A 6/11/2018    OPEN REPAIR OF ABDOMINAL AORTIC ANEURYSM REPAIR WITH PYEAG-TR-GSHRE BYPASS WITH CELL SAVER performed by Naga Sánchez MD at [address] [facility]"" FLX DX W/COLLJ SPEC BR/WA IF PFRMD N/A 6/12/2018    Dr Carpio-Likely ischemic colitis, internal hemorrhoids    TONSILLECTOMY      VASCULAR SURGERY  06/11/2018    DJM. AAA/lliac aneurysm repair, R fem EA/bypass    VASCULAR SURGERY  06/11/2018    DJM. Repair of abdominal aortic aneurysm and bilateral iliac aneurysm with aorta left iliac on the right and right femoral on the left with right common femoral endarterectomy with 18x9 gore-juliocesar bifurcated graft. Family History  Family History   Problem Relation Age of Onset    Other Mother     Cancer Father        Social History  Social History     Social History    Marital status: Single     Spouse name: N/A    Number of children: N/A    Years of education: N/A     Occupational History    Not on file.      Social History Main Topics    Smoking status: Current Every Day Smoker     Packs/day: 0.50     Years: 50.00     Types: Cigarettes    Smokeless tobacco: Never Used    Alcohol use Yes      Comment: 1 beer per year    Drug use: No    Sexual activity: Not Currently     Other Topics Concern    Not on file     Social History Narrative    No narrative on file         Review of Systems:  History obtained from chart review and the patient  General ROS: No fever or chills  Respiratory ROS: No cough, shortness of breath, wheezing  Cardiovascular ROS: no chest pain or dyspnea on exertion  Gastrointestinal ROS: No abdominal pain or melena  Genito-Urinary ROS: No dysuria or hematuria  Musculoskeletal ROS: No joint pain or swelling             Objective:  Blood pressure (!) 140/67, pulse 62, temperature 97.8 °F (36.6 °C), temperature source Temporal, resp. rate 16, height 6' 1\" (1.854 m), weight 180 lb 7 oz (81.8 kg), SpO2 98 %. Intake/Output Summary (Last 24 hours) at 06/28/18 1050  Last data filed at 06/28/18 0940   Gross per 24 hour   Intake              320 ml   Output             2500 ml   Net            -2180 ml     General: awake/alert  Chest:  clear to auscultation bilaterally without respiratory distress  CVS: regular rate and rhythm  Abdominal: soft, nontender, normal bowel sounds  Extremities: tr ble edema  Skin: no rash, left foot ischemic toe changes    Labs:  BMP:   Recent Labs      06/26/18   0507  06/27/18   0756  06/28/18   0615   NA  135*  136  134*   K  4.1  3.9  3.9   CL  94*  95*  95*   CO2  23  25  23   BUN  37*  37*  30*   CREATININE  6.9*  7.1*  6.4*   CALCIUM  7.8*  7.7*  7.8*     CBC:   Recent Labs      06/27/18   1617  06/28/18   0023  06/28/18   0615   WBC  11.7*  12.1*  11.2*   HGB  8.7*  8.3*  8.1*   HCT  26.6*  26.3*  26.9*   MCV  92.4  94.6*  98.5*   PLT  606*  580*  599*     LIVER PROFILE:   No results for input(s): AST, ALT, LIPASE, BILIDIR, BILITOT, ALKPHOS in the last 72 hours. Invalid input(s): AMYLASE,  ALB  PT/INR:   No results for input(s): PROTIME, INR in the last 72 hours. APTT: No results for input(s): APTT in the last 72 hours. BNP:  No results for input(s): BNP in the last 72 hours. Ionized Calcium:No results for input(s): IONCA in the last 72 hours. Magnesium:  No results for input(s): MG in the last 72 hours. Phosphorus:No results for input(s): PHOS in the last 72 hours. HgbA1C:   No results for input(s): LABA1C in the last 72 hours. Hepatic:   No results for input(s): ALKPHOS, ALT, AST, PROT, BILITOT, BILIDIR, LABALBU in the last 72 hours. Lactic Acid:   No results for input(s): LACTA in the last 72 hours.   Troponin: No results for input(s): CKTOTAL, CKMB,

## 2018-06-28 NOTE — PLAN OF CARE
Problem: Falls - Risk of:  Goal: Will remain free from falls  Will remain free from falls   Outcome: Ongoing    Goal: Absence of physical injury  Absence of physical injury   Outcome: Ongoing      Problem: Risk for Impaired Skin Integrity  Goal: Tissue integrity - skin and mucous membranes  Structural intactness and normal physiological function of skin and  mucous membranes.    Outcome: Ongoing      Problem: Pain:  Goal: Pain level will decrease  Pain level will decrease   Outcome: Ongoing    Goal: Control of acute pain  Control of acute pain   Outcome: Ongoing    Goal: Control of chronic pain  Control of chronic pain   Outcome: Ongoing

## 2018-06-28 NOTE — DISCHARGE SUMMARY
[facility]                      Inpatient Speech Therapy                Discharge Summary      Date: 2018  Patient Name: Lis Hester        MRN: [id number]    Account #: [de-identified]  : 1945  (81 y.o.)  Gender: male     Discharge date: 18      PATIENT DIAGNOSIS(ES):    Diagnosis: abdominal aortic aneurysm without rupture,( repair 18)        History of present illness: This 81 y. o. male who was referred to vascular surgeon Dr. Kingston Call for evaulation of AAA. Dr. Kingston Call recommended open repair AAA with [id number]-ph-omgqy-bypass with cell saver. Patient was in agreement and was admitted to [facility] on 18 for surgery. Immediate postop bleeding found while the patient was still in the operating room and had to be re-opened and bleeding was controlled. During the procedure he required several units of PRBCs, Platelets, and FFP. He became hypotensive and also required multiple boluses as well as pressors. He was then admitted to the ICU with ventilator support. Post op day 1 he was felt to have possibly developed ischemic colitis. GI was consulted. He was seen by Dr. Anhtony Sweet who performed a signmoidoscpy, which revealed what  appeared to be ischemic areas all the way down the rectum with erythema. Dr. Anthony Sweet felt surgical intervention would be needed. Surgical group was consulted. He was seen by Dr. Alyssa Collazo who recommended an exploratory laparotomy with possible bowel resection. The exploratory laparotmy was negative for ischemic bowel. He also developed acute kidney injury. Nephrology was consulted. He was seen by Dr. Brandi Kapoor, who recommended starting hemodialysis. He had placement of a left IJ dialysis cathter on 18. He is currently on hemodialysis T-TH-Sat. Still at this point with very minimal urine output. He was able to be extubated on 18. SPT evaluated for swallow. He was found to have a swallow delay and was placed on a regular diet with nectar thick liquids.  He was able

## 2018-06-29 VITALS
WEIGHT: 183.13 LBS | DIASTOLIC BLOOD PRESSURE: 62 MMHG | TEMPERATURE: 96.3 F | BODY MASS INDEX: 24.27 KG/M2 | RESPIRATION RATE: 20 BRPM | HEART RATE: 72 BPM | SYSTOLIC BLOOD PRESSURE: 116 MMHG | HEIGHT: 73 IN | OXYGEN SATURATION: 95 %

## 2018-06-29 LAB
ANION GAP SERPL CALCULATED.3IONS-SCNC: 16 MMOL/L (ref 7–19)
BUN BLDV-MCNC: 38 MG/DL (ref 8–23)
CALCIUM SERPL-MCNC: 7.7 MG/DL (ref 8.8–10.2)
CHLORIDE BLD-SCNC: 94 MMOL/L (ref 98–111)
CO2: 22 MMOL/L (ref 22–29)
CREAT SERPL-MCNC: 8.2 MG/DL (ref 0.5–1.2)
GFR NON-AFRICAN AMERICAN: 6
GLUCOSE BLD-MCNC: 100 MG/DL (ref 74–109)
HCT VFR BLD CALC: 26.1 % (ref 42–52)
HEMOGLOBIN: 7.8 G/DL (ref 14–18)
MCH RBC QN AUTO: 28.7 PG (ref 27–31)
MCHC RBC AUTO-ENTMCNC: 29.9 G/DL (ref 33–37)
MCV RBC AUTO: 96 FL (ref 80–94)
PDW BLD-RTO: 15.8 % (ref 11.5–14.5)
PLATELET # BLD: 562 K/UL (ref 130–400)
PMV BLD AUTO: 9.6 FL (ref 9.4–12.4)
POTASSIUM SERPL-SCNC: 3.9 MMOL/L (ref 3.5–5)
RBC # BLD: 2.72 M/UL (ref 4.7–6.1)
SODIUM BLD-SCNC: 132 MMOL/L (ref 136–145)
VANCOMYCIN RANDOM: 20 UG/ML
WBC # BLD: 9.9 K/UL (ref 4.8–10.8)

## 2018-06-29 PROCEDURE — 36415 COLL VENOUS BLD VENIPUNCTURE: CPT

## 2018-06-29 PROCEDURE — 6370000000 HC RX 637 (ALT 250 FOR IP): Performed by: HOSPITALIST

## 2018-06-29 PROCEDURE — 80202 ASSAY OF VANCOMYCIN: CPT

## 2018-06-29 PROCEDURE — 6370000000 HC RX 637 (ALT 250 FOR IP): Performed by: INTERNAL MEDICINE

## 2018-06-29 PROCEDURE — 2700000000 HC OXYGEN THERAPY PER DAY

## 2018-06-29 PROCEDURE — 6370000000 HC RX 637 (ALT 250 FOR IP): Performed by: PHYSICIAN ASSISTANT

## 2018-06-29 PROCEDURE — 8010000000 HC HEMODIALYSIS ACUTE INPT

## 2018-06-29 PROCEDURE — 99239 HOSP IP/OBS DSCHRG MGMT >30: CPT | Performed by: HOSPITALIST

## 2018-06-29 PROCEDURE — 6360000002 HC RX W HCPCS: Performed by: HOSPITALIST

## 2018-06-29 PROCEDURE — C9113 INJ PANTOPRAZOLE SODIUM, VIA: HCPCS | Performed by: HOSPITALIST

## 2018-06-29 PROCEDURE — 85027 COMPLETE CBC AUTOMATED: CPT

## 2018-06-29 PROCEDURE — 80048 BASIC METABOLIC PNL TOTAL CA: CPT

## 2018-06-29 PROCEDURE — 2580000003 HC RX 258: Performed by: PSYCHIATRY & NEUROLOGY

## 2018-06-29 PROCEDURE — 6370000000 HC RX 637 (ALT 250 FOR IP): Performed by: PSYCHIATRY & NEUROLOGY

## 2018-06-29 PROCEDURE — 2580000003 HC RX 258: Performed by: HOSPITALIST

## 2018-06-29 PROCEDURE — 99232 SBSQ HOSP IP/OBS MODERATE 35: CPT | Performed by: INTERNAL MEDICINE

## 2018-06-29 RX ORDER — HEPARIN SODIUM 1000 [USP'U]/ML
2000 INJECTION, SOLUTION INTRAVENOUS; SUBCUTANEOUS PRN
Status: DISCONTINUED | OUTPATIENT
Start: 2018-06-29 | End: 2018-06-29 | Stop reason: HOSPADM

## 2018-06-29 RX ORDER — METRONIDAZOLE 500 MG/1
500 TABLET ORAL 2 TIMES DAILY
Qty: 10 TABLET | Refills: 0
Start: 2018-06-29 | End: 2018-07-04

## 2018-06-29 RX ORDER — HYDROCODONE BITARTRATE AND ACETAMINOPHEN 5; 325 MG/1; MG/1
1 TABLET ORAL EVERY 8 HOURS PRN
Qty: 9 TABLET | Refills: 0 | Status: SHIPPED | OUTPATIENT
Start: 2018-06-29 | End: 2018-07-02

## 2018-06-29 RX ORDER — AMOXICILLIN AND CLAVULANATE POTASSIUM 875; 125 MG/1; MG/1
1 TABLET, FILM COATED ORAL DAILY
Qty: 5 TABLET | Refills: 0
Start: 2018-06-29 | End: 2018-07-04

## 2018-06-29 RX ORDER — AMLODIPINE BESYLATE 10 MG/1
10 TABLET ORAL DAILY
Qty: 30 TABLET | Refills: 0
Start: 2018-06-30 | End: 2018-12-04

## 2018-06-29 RX ORDER — IPRATROPIUM BROMIDE AND ALBUTEROL SULFATE 2.5; .5 MG/3ML; MG/3ML
3 SOLUTION RESPIRATORY (INHALATION) EVERY 4 HOURS PRN
Qty: 360 ML | Refills: 0
Start: 2018-06-29 | End: 2018-10-10 | Stop reason: ALTCHOICE

## 2018-06-29 RX ORDER — AMIODARONE HYDROCHLORIDE 200 MG/1
200 TABLET ORAL DAILY
Qty: 30 TABLET | Refills: 0
Start: 2018-06-30 | End: 2018-11-01 | Stop reason: SDUPTHER

## 2018-06-29 RX ORDER — ASPIRIN 81 MG/1
81 TABLET ORAL DAILY
Qty: 30 TABLET | Refills: 0
Start: 2018-06-30

## 2018-06-29 RX ORDER — HYDRALAZINE HYDROCHLORIDE 10 MG/1
10 TABLET, FILM COATED ORAL EVERY 8 HOURS SCHEDULED
Qty: 90 TABLET | Refills: 0
Start: 2018-06-29 | End: 2018-08-03

## 2018-06-29 RX ORDER — LACTOBACILLUS RHAMNOSUS GG 10B CELL
1 CAPSULE ORAL DAILY
Qty: 10 CAPSULE | Refills: 0
Start: 2018-06-30 | End: 2018-08-03

## 2018-06-29 RX ORDER — PANTOPRAZOLE SODIUM 40 MG/1
40 TABLET, DELAYED RELEASE ORAL DAILY
Qty: 30 TABLET | Refills: 0
Start: 2018-06-29 | End: 2018-08-03 | Stop reason: ALTCHOICE

## 2018-06-29 RX ORDER — GUAIFENESIN 600 MG/1
600 TABLET, EXTENDED RELEASE ORAL 2 TIMES DAILY
Qty: 10 TABLET | Refills: 0
Start: 2018-06-29 | End: 2018-07-04

## 2018-06-29 RX ADMIN — TAMSULOSIN HYDROCHLORIDE 0.4 MG: 0.4 CAPSULE ORAL at 12:37

## 2018-06-29 RX ADMIN — METRONIDAZOLE 500 MG: 500 TABLET ORAL at 05:59

## 2018-06-29 RX ADMIN — HYDRALAZINE HYDROCHLORIDE 10 MG: 10 TABLET, FILM COATED ORAL at 13:45

## 2018-06-29 RX ADMIN — Medication 1 CAPSULE: at 12:37

## 2018-06-29 RX ADMIN — AMLODIPINE BESYLATE 10 MG: 10 TABLET ORAL at 12:37

## 2018-06-29 RX ADMIN — COLLAGENASE SANTYL: 250 OINTMENT TOPICAL at 13:48

## 2018-06-29 RX ADMIN — AMIODARONE HYDROCHLORIDE 200 MG: 200 TABLET ORAL at 12:37

## 2018-06-29 RX ADMIN — Medication 10 ML: at 12:38

## 2018-06-29 RX ADMIN — PANTOPRAZOLE SODIUM 40 MG: 40 INJECTION, POWDER, FOR SOLUTION INTRAVENOUS at 12:37

## 2018-06-29 RX ADMIN — MEROPENEM 500 MG: 500 INJECTION, POWDER, FOR SOLUTION INTRAVENOUS at 13:45

## 2018-06-29 RX ADMIN — METRONIDAZOLE 500 MG: 500 TABLET ORAL at 13:46

## 2018-06-29 RX ADMIN — GUAIFENESIN 600 MG: 600 TABLET, EXTENDED RELEASE ORAL at 12:37

## 2018-06-29 RX ADMIN — HYDROCODONE BITARTRATE AND ACETAMINOPHEN 2 TABLET: 5; 325 TABLET ORAL at 05:59

## 2018-06-29 ASSESSMENT — PAIN SCALES - GENERAL
PAINLEVEL_OUTOF10: 5
PAINLEVEL_OUTOF10: 3

## 2018-06-29 ASSESSMENT — PAIN DESCRIPTION - PAIN TYPE: TYPE: CHRONIC PAIN

## 2018-06-29 NOTE — CARE COORDINATION
[facility] SNF planning to take Pt when HD chair time is set-up. LUISA still attempting confirmation on HD chair time as none given, thus far, will work with transit services. LUISA placed f/u call again today to Maryjo at [employer] re: set-up confirmation. She stated that she thought we had pt set up for TTS 5:30AM, LUISA explained that LUISA left two messages for her yesterday indicating that time will not work and needing another time re: transit with SNF. LUISA advised if we can try [place] or [facility]; Susana put LUISA on hold to check clinic times. Susana indicated that they only have 2PM or after or 5:30AM chair times at [place], [facility], [place], and [facility]; and that [facility] clinic is full. LUISA placed call to [employer]  with Jimmy; he will call his DO and see if they can get Pt a time that would work with Pt at a SNF, would need approx 8AM-1:30PM chair time at latest. He will call LUISA back.     Electronically signed by VIRGIL Cheng on 6/29/2018 at 9:08 AM

## 2018-06-29 NOTE — DISCHARGE SUMMARY
None  FINDINGS:  There is a nonobstructive bowel gas pattern. No pathologic  calcification or organomegaly is visualized. Incidentally noted  surgical clips along the mid abdomen. Oral contrast from recent  dysphagia study is identified in the distal small bowel and colon. Evaluation for free intraperitoneal air is limited on a supine  radiograph, but there are no definite findings of free intraperitoneal  air. The osseous structures demonstrate no acute abnormality. Left total  hip arthroplasty.     Impression:       1. No radiographic evidence of acute abdominal process. Signed by Dr Dianne Navarro on 6/26/2018 4:29 PM     [facility] MODIFIED BARIUM Kelsie Real VIDEO [[id number]] Resulted: 06/26/18 1133     Order Status: Completed Updated: 06/26/18 1035     Narrative:       EXAMINATION:  FL MODIFIED BARIUM SWALLOW W VIDEO  6/26/2018 10:31 AM  HISTORY: Dysphasia  COMPARISON: No comparison study. TECHNIQUE:   Image number: 1. Fluoroscopy time: 2.2 minutes. The oropharyngeal phase of swallowing was evaluated with multiple  barium consistencies. FINDINGS:   There is vallecular pooling appreciated with all barium consistencies  with a anterior directed curvature of the epiglottis incidentally  noted, significance uncertain. The swallow was otherwise unremarkable without laryngeal penetration  or aspiration.     Impression:       1. Vallecular pooling without laryngeal penetration or aspiration. Signed by Dr Alessandro Aldana on 6/26/2018 10:33 AM     FL GUIDED NEEDLE PLACEMENT [[id number]] Updated: 06/26/18 1015     Order Status: Canceled      FL MODIFIED Lennox Grass VIDEO [[id number]] Updated: 06/26/18 1009     Order Status: Canceled      XR CHEST PORTABLE [[id number]] Resulted: 06/22/18 1640     Order Status: Completed Updated: 06/22/18 1542     Narrative:       EXAMINATION:  XR CHEST PORTABLE  6/22/2018 3:39 PM  HISTORY: Productive coughing and fever. COMPARISON: 6/19/2018.   FINDINGS: Cecillia Fujita are small bilateral pleural 2 times daily for 5 days             hydrALAZINE (APRESOLINE) 10 MG tablet  Take 1 tablet by mouth every 8 hours             HYDROcodone-acetaminophen (NORCO) 5-325 MG per tablet  Take 1 tablet by mouth every 8 hours as needed for Pain for up to 3 days. Intended supply: 3 days. Take lowest dose possible to manage pain. ipratropium-albuterol (DUONEB) 0.5-2.5 (3) MG/3ML SOLN nebulizer solution  Inhale 3 mLs into the lungs every 4 hours as needed for Shortness of Breath             lactobacillus (CULTURELLE) CAPS capsule  Take 1 capsule by mouth daily for 10 days             metroNIDAZOLE (FLAGYL) 500 MG tablet  Take 1 tablet by mouth 2 times daily for 5 days             Multiple Vitamins-Minerals (MULTIVITAMIN ADULTS 50+) TABS  Take by mouth daily             Omega-3 Fatty Acids (OMEGA-3 CF PO)  Take by mouth daily              pantoprazole (PROTONIX) 40 MG tablet  Take 1 tablet by mouth daily             tamsulosin (FLOMAX) 0.4 MG capsule  TAKE 1 CAPSULE BY MOUTH EVERY DAY                   Discharge Instructions: Follow up with [address] [place] [address],[address], DO in 3 days. Take medications as directed. Resume activity as tolerated. Follow up with Dr Sarah Addison in one week, Dr Jeniffer Joshi in one week, Dr Raleigh Agudelo in one week, Dr Matilde Haley in one week. Cbc three times a week  Transfuse as needed     Diet: DIET DYSPHAGIA III ADVANCED; Carb Control: 4 carb choices (60 gms)/meal; Dysphagia III Advanced; Cardiac     Disposition: Patient is medically stable and will be discharged to NH.     Dc time 37 min    Javier Banerjee MD

## 2018-06-29 NOTE — PROGRESS NOTES
06/29/18 0859   General Comment   Comments Attempt in AM patient in dialysis   Physical Therapy    Electronically signed by Alyssa Curry PTA on 6/29/2018 at 9:01 AM

## 2018-06-29 NOTE — PROGRESS NOTES
Call placed to Dr. Lacey Brizuela answering service regarding pt OP HD chair time to inquire if pt can wait until first scheduled chair time on 7/1/18 or if he should stay at [facility] to dialyze tomorrow before dc to [facility]. Will follow-up when callback is received.   Electronically signed by Palak Wright RN on 6/29/2018 at 12:07 PM

## 2018-06-29 NOTE — PROGRESS NOTES
[facility]  Progress Note                            Date:  6/29/2018  Patient: Olga Charles  Admission:  6/22/2018 11:02 AM  Admit DX: SVT (supraventricular tachycardia) ([facility] [address] [I47.1]  Age:  81 y.o., 1945     LOS: 7 days     Reason for evaluation:   atrial fibrillation/NSR      SUBJECTIVE:    The patient was seen and examined. Notes and labs reviewed. There were not complications over night. Patient's cardiac review of systems: negative for irregular heart beat. The patient is  gradually improving. Seen in dialysis.       OBJECTIVE:    Telemetry: Sinus heart rate improved off BB.       amiodarone  200 mg Oral Daily    meropenem  500 mg Intravenous Q24H    metroNIDAZOLE  500 mg Oral 3 times per day    pantoprazole  40 mg Intravenous BID    sodium chloride  250 mL Intravenous Once    hydrALAZINE  10 mg Oral 3 times per day    collagenase   Topical Daily    lactobacillus  1 capsule Oral Daily    guaiFENesin  600 mg Oral BID    amLODIPine  10 mg Oral Daily    vancomycin (VANCOCIN) intermittent dosing (placeholder)   Other RX Placeholder    chlorhexidine  15 mL Mouth/Throat BID    aspirin  81 mg Oral Daily    sodium chloride flush  10 mL Intravenous 2 times per day    insulin lispro  0-35 Units Subcutaneous 4x Daily AC & HS    tamsulosin  0.4 mg Oral Daily        dextrose             /65   Pulse 68   Temp 98 °F (36.7 °C) (Temporal)   Resp 16   Ht 6' 1\" (1.854 m)   Wt 183 lb 2 oz (83.1 kg)   SpO2 98%   BMI 24.16 kg/m²     Intake/Output Summary (Last 24 hours) at 06/29/18 0904  Last data filed at 06/28/18 0940   Gross per 24 hour   Intake              100 ml   Output                0 ml   Net              100 ml           Labs:   CBC:   Recent Labs      06/28/18   1933  06/29/18   0455   WBC  10.0  9.9   HGB  8.3*  7.8*   HCT  25.9*  26.1*   PLT  665*  562*     BMP: Recent Labs      06/28/18   0615  06/29/18   0455   NA  134*  132*   K  3.9  3.9 CO2  23  22   BUN  30*  38*   CREATININE  6.4*  8.2*   LABGLOM  9*  6*   GLUCOSE  104  100     BNP: No results for input(s): BNP in the last 72 hours. PT/INR: No results for input(s): PROTIME, INR in the last 72 hours. APTT:No results for input(s): APTT in the last 72 hours. CARDIAC ENZYMES:No results for input(s): CKTOTAL, CKMB, CKMBINDEX, TROPONINI in the last 72 hours. FASTING LIPID PANEL:  Lab Results   Component Value Date    HDL 45 2018    LDLDIRECT 170 2017    LDLCALC 59 2018    TRIG 153 2018     LIVER PROFILE:No results for input(s): AST, ALT, LABALBU in the last 72 hours. NURSE:  REGINA Fay : 1945, Male, 81 y.o. History:  Paroxysmal atrial flutter    Nursing note and diagnostics above reviewed and evaluated    [Allergies/Contraindications:  Patient has no known allergies.]     Todays status: seen in dialysis. Has no complaints    Physical Examination    Respiratory -  Lungs clear. Cardiovascular   Regular rhythm without murmur or gallop  Abdominal -  Soft. Bowel sounds present. Nontender. Extremities -  no edema. Assessment/Plan     continue current dose of amiodarone. Patient may be discharged or transferred from cardiac standpoint. Discussed with patient and nursing      JESSICA Diaz MD  [facility]

## 2018-06-29 NOTE — PROGRESS NOTES
Vascular Round Note:    In Dialysis this am, no new complaints voiced    Abdominal incision line and RLQ incision line DARIO, CDI and healing well  Left forefoot unchanged, no open/draining wounds. Toe tips still ischemic, patient able to move toes/ankle/foot.            Document Information     Wound   Left foot wound photo   06/28/2018 10:06        Document Information     Wound   Left foot toe wounds   06/28/2018 10:06     Vital signs stable    Appetite fair    Hgb 7.8/Hct 26    Plans in progress for SNF for rehab and O/P dialysis

## 2018-06-29 NOTE — PROGRESS NOTES
Nephrology ([facility]) Progress Note    Patient:  Darrius Torre  YOB: 1945  Date of Service: 6/29/2018  MRN: [id number]   Acct: [de-identified]   Primary Care Physician: Fracisco Bartlett DO  Advance Directive: Full Code  Admit Date: 6/22/2018       Hospital Day: 7  Referring Provider: [facility], *    Patient independently seen and examined, Chart, Consults, Notes, Operative notes, Labs, Cardiology, and Radiology studies reviewed as able. Subjective:  Darrius Torre is a 81 y.o. male  whom we were consulted for NORY. History was originally obtained from chart and nursing staff as patient is currently intubated and no family members are present or able to be reached via phone.  Mr. Nat Hagan underwent AAA repair on 6/11/18. During the procedure he required several units of PRBCs, Platelets, and FFP.   He became hypotensive and also required multiple boluses as well as pressors.  Over the course of the night he became anuric.   His creatinine on 5/29/18 was 0.7 and 1.1 on 6/11/18. Routine chemistries were obtained this am and his creatinine is now up to to 2.9. Zenobia Dial the constellation of findings we were asked to see him. Alberto Alvarez with findings of ischemic bowel via scope that required ex lap for eval without colectomy. No known prior renal disease. Was transferred to rehab, but returned to CCU for AFib. Transferred out to the floor again 6/25. Today, no new events. Agreeable to HD for now. Questions about outpt HD, still unsure of final dispo. D/w RN about dispo. Dialysis   Pt was seen on RRT  Modality: Hemodialysis  Access: Catheter  Location: left upper  QB: 400  QD: 600  UF: 990      Allergies:  Patient has no known allergies.     Medicines:  Current Facility-Administered Medications   Medication Dose Route Frequency Provider Last Rate Last Dose    heparin (porcine) injection 2,000 Units  2,000 Units Intercatheter PER Donnelly MD  heparin (porcine) injection 2,000 Units  2,000 Units Intercatheter PRN Lidia Harmon MD        amiodarone (CORDARONE) tablet 200 mg  200 mg Oral Daily CRocky Fried MD        meropenem [facility]) 500 mg in sodium chloride 0.9 % 100 mL IVPB  500 mg Intravenous Q24H Charlcie Bosworth, MD        metroNIDAZOLE (FLAGYL) tablet 500 mg  500 mg Oral 3 times per day Charlcie Bosworth, MD   500 mg at 06/29/18 0559    pantoprazole (PROTONIX) injection 40 mg  40 mg Intravenous BID Charlcie Bosworth, MD   40 mg at 06/28/18 2241    0.9 % sodium chloride bolus  250 mL Intravenous Once Charlcie Bosworth, MD        hydrALAZINE (APRESOLINE) tablet 10 mg  10 mg Oral 3 times per day Charlcie Bosworth, MD   10 mg at 06/28/18 2238    collagenase ointment   Topical Daily Jovita Batres MD        lactobacillus (CULTURELLE) capsule 1 capsule  1 capsule Oral Daily Charlcie Bosworth, MD   1 capsule at 06/28/18 0857    guaiFENesin [facility]) extended release tablet 600 mg  600 mg Oral BID Charlcie Bosworth, MD   600 mg at 06/28/18 2240    loperamide (IMODIUM) capsule 2 mg  2 mg Oral 4x Daily PRN Kenton Schultz, DO   2 mg at 06/27/18 0117    amLODIPine (NORVASC) tablet 10 mg  10 mg Oral Daily Evaline Waggoner Petra, DO   10 mg at 06/28/18 1552    vancomycin (VANCOCIN) intermittent dosing (placeholder)   Other RX Placeholder Fung Marion, DO        chlorhexidine (PERIDEX) 0.12 % solution 15 mL  15 mL Mouth/Throat BID Ricky Castle MD   15 mL at 06/28/18 2238    dextrose 5 % solution  100 mL/hr Intravenous PRN Ricky Castle MD        dextrose 50 % solution 12.5 g  12.5 g Intravenous PRN Ricky Castle MD        glucagon (rDNA) injection 1 mg  1 mg Intramuscular PRN Ricky Castle MD        glucose (GLUTOSE) 40 % oral gel 15 g  15 g Oral PRN Ricky Castle MD        acetaminophen (TYLENOL) tablet 650 mg  650 mg Oral Q4H PRN Ricky Castle MD        bisacodyl (DULCOLAX) ALT, AST, PROT, BILITOT, BILIDIR, LABALBU in the last 72 hours. Lactic Acid:   No results for input(s): LACTA in the last 72 hours. Troponin: No results for input(s): CKTOTAL, CKMB, TROPONINT in the last 72 hours. ABGs:   Lab Results   Component Value Date    PHART 7.420 06/14/2018    PO2ART 65.0 06/14/2018    STJ6DUF 38.0 06/14/2018     CRP:  No results for input(s): CRP in the last 72 hours. Sed Rate:  No results for input(s): SEDRATE in the last 72 hours. Culture Results:   Blood Culture Recent:   Recent Labs      06/22/18   1444   BC  No growth after 5 days of incubation. Urine Culture Recent : No results for input(s): LABURIN in the last 720 hours. Radiology reports as per the Radiologist  Radiology: Xr Chest Standard (2 Vw)    Result Date: 5/29/2018  XR CHEST (2 VW) 5/29/2018 12:30 PM Two-view chest: History: Hypertension Frontal and lateral projection chest radiograph obtained. Comparison:  none Findings: There are COPD changes. The lungs are clear without acute infiltrates. The heart is normal in size, pulmonary circulation appropriate, without heart failure. There are scoliotic and degenerative changes diffusely in the thoracolumbar spine with bridging osteophyte formation. .                                                                                    Impression: 1. COPD changes. 2. No acute cardiopulmonary process. Signed by Dr Kimberly Roth on 5/29/2018 1:53 PM    Us Renal Complete    Result Date: 6/12/2018  History: 44-year-old evaluated for acute renal failure. Reference: CT abdomen pelvis April 23, 2018. Findings: Renal ultrasound was performed. Both kidneys are normal in size, contour, and cortical thickness/echogenicity. Renal cortical thickness is 1.5-2 cm throughout. No hydronephrosis. No renal cyst or mass. There is a tiny cyst from the posterior left renal cortex on recent CT which is likely too small for detection by ultrasound. Urinary bladder not well assessed.  There is a

## 2018-06-29 NOTE — PROGRESS NOTES
Nutrition Assessment    Type and Reason for Visit: Initial, Positive Nutrition Screen    Nutrition Recommendations: modify current diet to include Carb Control 4    Malnutrition Assessment:  · Malnutrition Status: No malnutrition  · Context: Acute illness or injury    Nutrition Diagnosis:   · Problem: Increased nutrient needs  · Etiology: related to Increased demand for energy/nutrients due to     Signs and symptoms:  as evidenced by Presence of wounds    Nutrition Assessment:  · Subjective Assessment: Following for LOS x 7days. Appetite is fairly good with intake 50-75%. Modified diet with the addition of Carb control. Positive nutrition screen for wounds. · Nutrition-Focused Physical Findings:    · Wound Type: Unstageable, Skin Tears, Surgical Wound  · Current Nutrition Therapies:  · Oral Diet Orders: Dysphagia 3, Cardiac   · Oral Diet intake: 51-75%  · Oral Nutrition Supplement (ONS) Orders: None     · Anthropometric Measures:  · Ht: 6' 1\" (185.4 cm)   · Current Body Wt: 183 lb 2 oz (83.1 kg)  · Admission Body Wt: 179 lb (81.2 kg) (stated)  · Ideal Body Wt:   184 lbs,  · BMI Classification: BMI 18.5 - 24.9 Normal Weight  · Comparative Standards (Estimated Nutrition Needs):  · Estimated Daily Total Kcal:    · Estimated Daily Protein (g):      Estimated Intake vs Estimated Needs: Intake Improving    Nutrition Risk Level: Low    Nutrition Interventions:   Modify current diet  Continued Inpatient Monitoring    Nutrition Evaluation:   · Evaluation:     · Goals: po intake 75% or greater. Wound improvement    · Monitoring: Meal Intake, Pertinent Labs, Diet Tolerance    See Adult Nutrition Doc Flowsheet for more detail.      Electronically signed by Misha Iverson MS, RD, LD on 6/29/18 at 2:38 PM    Contact Number: [phone]

## 2018-06-29 NOTE — CARE COORDINATION
6/29/18: Call placed to [facility] at [facility]; we need HD first visit to be tomorrow 6/30/18 as SNF does not accept weekend admissions, and per nephrology, Pt cannot wait until 7/3/18 for HD. Awaiting return call to see if this can be accomplished.   Electronically signed by VIRGIL Rascon on 6/29/2018 at 12:28 PM

## 2018-06-30 NOTE — PROGRESS NOTES
Report was called to Iman Molina at [facility] over 2 hours ago at 18:52. At the end of the transfer report, the nurse taking the pt began to argue and state they would not take the pt d/t pt's transport to HD tomorrow. Per Elva Alex, this issue had been resolved as early as 15:00 today and [facility] had agreed at that time to accept the pt today. After multiple, MULTIPLE calls back and forth between myself, nursing staff at [facility], Elva Alex at [facility], Hilda Camara (LUISA), Kenzie Rey (GINNY), and Mikhail Brown (3rd Unit Manager), I was told that the nurse taking this pt would call me back for report. After 20 min, she still had not called so I called Shorty. I had to call back a total of 6 times--5 of which I was repeatedly hung up on--and on the 6th call, an [place] wing SRNA answered stating the nurses were busy. I explained to her that Iman Molina had already received report from myself and that I was notifying EMS to transport the pt before it got any later in the evening. She voiced her understanding. Kiana Cortez LPN, has been updated to this development.   EMS has been notified and has added him to their list.  Electronically signed by Marquita Lai RN on 6/29/2018 at 9:09 PM

## 2018-07-03 ENCOUNTER — TELEPHONE (OUTPATIENT)
Dept: CARDIOLOGY | Age: 73
End: 2018-07-03

## 2018-07-03 NOTE — TELEPHONE ENCOUNTER
German Castro called for HFU: Schedule an appointment with JESSICA Foote MD (Cardiology) on 7/6/2018; superventricular tachycardia, [facility] did not have availability for 07/06, please contact [phone] to schedule ask for Sherron Gottron or Shamar

## 2018-07-03 NOTE — TELEPHONE ENCOUNTER
Denisse Craven don't have anything for Arlyn Patel at [facility] on Friday 7/6/18, is there anyway we can get this patient in with Dr Venus Chopra on Monday? What can you advise?

## 2018-07-05 NOTE — TELEPHONE ENCOUNTER
See if you are able to r/s someone that is just a 6 mo or 1 yr follow up on Monday so patient can be put on schedule.

## 2018-07-07 ENCOUNTER — LAB REQUISITION (OUTPATIENT)
Dept: LAB | Facility: HOSPITAL | Age: 73
End: 2018-07-07

## 2018-07-07 DIAGNOSIS — Z00.00 ROUTINE GENERAL MEDICAL EXAMINATION AT A HEALTH CARE FACILITY: ICD-10-CM

## 2018-07-07 LAB — C DIFF TOX GENS STL QL NAA+PROBE: NEGATIVE

## 2018-07-07 PROCEDURE — 87899 AGENT NOS ASSAY W/OPTIC: CPT

## 2018-07-07 PROCEDURE — 87046 STOOL CULTR AEROBIC BACT EA: CPT

## 2018-07-07 PROCEDURE — 87045 FECES CULTURE AEROBIC BACT: CPT

## 2018-07-07 PROCEDURE — 87493 C DIFF AMPLIFIED PROBE: CPT

## 2018-07-07 PROCEDURE — 87077 CULTURE AEROBIC IDENTIFY: CPT

## 2018-07-09 LAB
BACTERIA SPEC AEROBE CULT: NORMAL
E COLI SXT1 STL QL IA: NEGATIVE
E COLI SXT2 STL QL IA: NEGATIVE

## 2018-07-11 ENCOUNTER — OFFICE VISIT (OUTPATIENT)
Dept: VASCULAR SURGERY | Age: 73
End: 2018-07-11

## 2018-07-11 VITALS
WEIGHT: 185 LBS | HEIGHT: 73 IN | BODY MASS INDEX: 24.52 KG/M2 | DIASTOLIC BLOOD PRESSURE: 64 MMHG | HEART RATE: 77 BPM | RESPIRATION RATE: 18 BRPM | SYSTOLIC BLOOD PRESSURE: 121 MMHG

## 2018-07-11 DIAGNOSIS — I71.40 ABDOMINAL AORTIC ANEURYSM (AAA) WITHOUT RUPTURE: Primary | ICD-10-CM

## 2018-07-11 DIAGNOSIS — I74.8 ATHEROEMBOLISM (HCC): ICD-10-CM

## 2018-07-11 PROCEDURE — 99024 POSTOP FOLLOW-UP VISIT: CPT | Performed by: NURSE PRACTITIONER

## 2018-07-11 RX ORDER — METRONIDAZOLE 500 MG/1
500 TABLET ORAL 3 TIMES DAILY
COMMUNITY
End: 2018-08-03

## 2018-07-11 RX ORDER — HYDROCODONE BITARTRATE AND ACETAMINOPHEN 5; 325 MG/1; MG/1
1 TABLET ORAL EVERY 6 HOURS PRN
COMMUNITY
End: 2018-10-10 | Stop reason: ALTCHOICE

## 2018-07-11 RX ORDER — AMOXICILLIN AND CLAVULANATE POTASSIUM 875; 125 MG/1; MG/1
1 TABLET, FILM COATED ORAL 2 TIMES DAILY
COMMUNITY
End: 2018-07-30 | Stop reason: ALTCHOICE

## 2018-07-11 RX ORDER — GUAIFENESIN 600 MG/1
1200 TABLET, EXTENDED RELEASE ORAL 2 TIMES DAILY
COMMUNITY
End: 2018-08-03

## 2018-07-11 NOTE — PROGRESS NOTES
Darrius Torre is a 81 y.o. male who presents for post op evaluation. Patient had a  open AAA repair 4 weeks ago. This was performed by Dr. Vonda Mayorga. He had atheroemboli to the left foot. He comes in today for us to look at this. Post op symptoms reported pain in foot. Post op pain is moderate. On evaluation today, incision is healed. He has dry gangrene of the 1st-3rd toes and the distal half of the 4th and 5th. He has some discoloration to the distal posterior foot. There is no drainage or redness. This was evaluated by Dr. Vonda Mayorga. dorsalis pedis and posterior tibial pulses are dopplered . Today we have recommended he watch this closely. We will see him weekly. He will need at least toes amputated and possibly a forefoot amputation but we will wait and see how this demarcates. The nursing home will call us with any changes. .  We have recommended continued ASA 81 mg po qd daily. We will follow up with him in 1 weeks. This should bring you up to date on Darrius Torre  As always we want to thank you for allowing us to participate in the care of your patients.     Sincerely,    TORITO Huang

## 2018-07-12 ENCOUNTER — LAB REQUISITION (OUTPATIENT)
Dept: LAB | Facility: HOSPITAL | Age: 73
End: 2018-07-12

## 2018-07-12 ENCOUNTER — TELEPHONE (OUTPATIENT)
Dept: VASCULAR SURGERY | Age: 73
End: 2018-07-12

## 2018-07-12 DIAGNOSIS — Z00.00 ROUTINE GENERAL MEDICAL EXAMINATION AT A HEALTH CARE FACILITY: ICD-10-CM

## 2018-07-12 PROCEDURE — 87086 URINE CULTURE/COLONY COUNT: CPT

## 2018-07-12 PROCEDURE — 87077 CULTURE AEROBIC IDENTIFY: CPT

## 2018-07-12 PROCEDURE — 87186 SC STD MICRODIL/AGAR DIL: CPT

## 2018-07-15 LAB — BACTERIA SPEC AEROBE CULT: ABNORMAL

## 2018-07-16 ENCOUNTER — OFFICE VISIT (OUTPATIENT)
Dept: CARDIOLOGY | Age: 73
End: 2018-07-16
Payer: MEDICARE

## 2018-07-16 VITALS
DIASTOLIC BLOOD PRESSURE: 70 MMHG | WEIGHT: 184 LBS | SYSTOLIC BLOOD PRESSURE: 108 MMHG | HEART RATE: 74 BPM | BODY MASS INDEX: 24.39 KG/M2 | HEIGHT: 73 IN

## 2018-07-16 DIAGNOSIS — N17.9 AKI (ACUTE KIDNEY INJURY) (HCC): ICD-10-CM

## 2018-07-16 DIAGNOSIS — Z86.79 S/P AAA REPAIR: ICD-10-CM

## 2018-07-16 DIAGNOSIS — E78.2 MIXED HYPERLIPIDEMIA: ICD-10-CM

## 2018-07-16 DIAGNOSIS — Z98.890 S/P AAA REPAIR: ICD-10-CM

## 2018-07-16 DIAGNOSIS — I47.1 SVT (SUPRAVENTRICULAR TACHYCARDIA) (HCC): Primary | ICD-10-CM

## 2018-07-16 DIAGNOSIS — I10 ESSENTIAL HYPERTENSION: ICD-10-CM

## 2018-07-16 PROCEDURE — 99213 OFFICE O/P EST LOW 20 MIN: CPT | Performed by: NURSE PRACTITIONER

## 2018-07-16 RX ORDER — LOPERAMIDE HYDROCHLORIDE 2 MG/1
2 CAPSULE ORAL 4 TIMES DAILY PRN
COMMUNITY
End: 2018-08-03

## 2018-07-16 RX ORDER — MONTELUKAST SODIUM 4 MG/1
1 TABLET, CHEWABLE ORAL DAILY
COMMUNITY
End: 2018-10-10 | Stop reason: ALTCHOICE

## 2018-07-16 NOTE — PROGRESS NOTES
syncope. He has no other complaints. Review of Systems    Constitutional: Negative for +fever, +chills, diaphoresis, activity change, appetite change, fatigue and unexpected weight change. Eyes: Negative for photophobia, pain, redness and visual disturbance. Respiratory: Negative for apnea, + cough, chest tightness, +shortness of breath, wheezing and stridor. Cardiovascular: Negative for chest pain, palpitations and +leg swelling. Gastrointestinal: Negative for abdominal distention. Genitourinary: Negative for +dysuria, urgency and frequency. Musculoskeletal: Negative for myalgias, arthralgias and gait problem. Skin: Negative for color change, pallor, rash and wound. Neurological: Negative for dizziness, tremors, speech difficulty, weakness and numbness. Hematological: Does not bruise/bleed easily. Psychiatric/Behavioral: Negative.         Past Medical History:   Diagnosis Date    Aneurysm ([facility]     Arthritis     BPH (benign prostatic hyperplasia)     CAD (coronary artery disease)     Chronic back pain     Diabetes (HCC)     type 1    Erectile dysfunction     Hemodialysis patient ([facility]     History of blood transfusion     Hypertension     Osteoarthritis     Palliative care encounter 06/25/2018       Past Surgical History:   Procedure Laterality Date    ABDOMEN SURGERY      APPENDECTOMY      BACK SURGERY      COLONOSCOPY      HC DIALYSIS CATHETER  6/12/2018    CATHETER INSERTION HEMODIALYSIS performed by Sandi Alonzo MD at [address] [facility] Left     JOINT REPLACEMENT      MASTOID SURGERY Left     NECK SURGERY      TN EXPLORATORY OF ABDOMEN N/A 6/12/2018    LAPAROTOMY EXPLORATORY performed by Mya Marc MD at [place] [address] ANEURYSM/GRFT INS,ABD AORT W/VISCER N/A 6/11/2018    OPEN REPAIR OF ABDOMINAL AORTIC ANEURYSM REPAIR WITH [id number] BYPASS WITH CELL SAVER performed by Sandi Alonzo MD at [address] [facility] FLX DX W/COLLJ SPEC BR/WA IF range of motion. He is in a wheelchair today. Neurological: He is alert and oriented to person, place, and time. Skin: Skin is warm and dry without rash or pallor. Psychiatric: He has a normal mood and affect. His behavior is normal. Thought content normal.     Lab Results   Component Value Date    CREATININE 8.2 06/29/2018    CREATININE 6.4 06/28/2018    CREATININE 7.1 06/27/2018    HGB 7.8 06/29/2018    HGB 8.3 06/28/2018    HGB 8.1 06/28/2018       TTE 6/22/2018   Technically difficult examination.   Technically limited study.   No Doppler stuidies   Technically difficulty study due to poor acoustic windows and body   habitus.   This study is of limited diagnostic utility.   Suggest transesophageal echocardiogram to heightened diagnostic   sensitivity and specific. Assessment    1. SVT (supraventricular tachycardia) ([address]    2. Essential hypertension    3. Mixed hyperlipidemia    4. S/P AAA repair    5. NORY (acute kidney injury) ([address]          Plan:  SVT- Stable. No further episodes. Continue Cordarone. He will need repeat LFTs. HTN- Stable. No changes  HLD- On Lipitor. Managed by PCP  Sinus drainage- Patient encouraged to call for an appointment with his PCP as he has not seen him since his recent hospitalization. Also, to discuss his concern of sinus drainage and ear problems. Please do not hesitate to contact me for any questions or concerns.     Sincerely yours,    Amy Hoang, [facility]

## 2018-07-17 DIAGNOSIS — R52 CHRONIC GENERALIZED PAIN: Primary | ICD-10-CM

## 2018-07-17 DIAGNOSIS — G89.29 CHRONIC GENERALIZED PAIN: Primary | ICD-10-CM

## 2018-07-17 RX ORDER — DRONABINOL 2.5 MG/1
2.5 CAPSULE ORAL
Qty: 60 CAPSULE | Refills: 0 | Status: SHIPPED | OUTPATIENT
Start: 2018-07-17 | End: 2018-08-03

## 2018-07-18 ENCOUNTER — OFFICE VISIT (OUTPATIENT)
Dept: VASCULAR SURGERY | Age: 73
End: 2018-07-18

## 2018-07-18 VITALS
HEART RATE: 74 BPM | RESPIRATION RATE: 18 BRPM | DIASTOLIC BLOOD PRESSURE: 60 MMHG | SYSTOLIC BLOOD PRESSURE: 130 MMHG | TEMPERATURE: 98.4 F

## 2018-07-18 DIAGNOSIS — Z09 FOLLOW UP: Primary | ICD-10-CM

## 2018-07-18 PROCEDURE — 99024 POSTOP FOLLOW-UP VISIT: CPT | Performed by: PHYSICIAN ASSISTANT

## 2018-07-18 NOTE — PROGRESS NOTES
Steffanie Javed is a 72 y.o. male who presents for post op evaluation. Patient had a  open AAA repair 4 weeks ago. This was performed by Dr. Victorina Caldera. He had atheroemboli to the left foot. He comes in today for evaluation by Dr. Victorina Caldera. Post op symptoms reported pain in foot. Post op pain is moderate. On evaluation today, incision is healed. He has dry gangrene of the 1st-3rd toes and the distal half of the 4th and 5th. He has some discoloration to the distal posterior foot. (no significant changes noted from previous wound check visit) There is no drainage or redness. This was evaluated by Dr. Victorina Caldera. Palpable pulse noted. He will need at least toes amputated and possibly a forefoot amputation but we will wait and see how this demarcates. We will watch this closely. The nursing home will call us with any changes. .  We have recommended continued ASA 81 mg po qd daily. We will follow up with him in 1 week. This should bring you up to date on Steffanie Javed  As always we want to thank you for allowing us to participate in the care of your patients.     Sincerely,    Aidan Broussard PA-C

## 2018-07-30 ENCOUNTER — OFFICE VISIT (OUTPATIENT)
Dept: VASCULAR SURGERY | Age: 73
End: 2018-07-30

## 2018-07-30 VITALS — HEART RATE: 59 BPM | TEMPERATURE: 97.9 F | DIASTOLIC BLOOD PRESSURE: 63 MMHG | SYSTOLIC BLOOD PRESSURE: 127 MMHG

## 2018-07-30 DIAGNOSIS — Z09 POSTOP CHECK: Primary | ICD-10-CM

## 2018-07-30 PROCEDURE — 99024 POSTOP FOLLOW-UP VISIT: CPT | Performed by: PHYSICIAN ASSISTANT

## 2018-08-03 ENCOUNTER — OFFICE VISIT (OUTPATIENT)
Dept: GASTROENTEROLOGY | Age: 73
End: 2018-08-03
Payer: MEDICARE

## 2018-08-03 VITALS
DIASTOLIC BLOOD PRESSURE: 58 MMHG | OXYGEN SATURATION: 93 % | WEIGHT: 162 LBS | HEIGHT: 73 IN | SYSTOLIC BLOOD PRESSURE: 130 MMHG | BODY MASS INDEX: 21.47 KG/M2 | HEART RATE: 69 BPM

## 2018-08-03 DIAGNOSIS — Z99.2 ACUTE RENAL FAILURE ON DIALYSIS (HCC): ICD-10-CM

## 2018-08-03 DIAGNOSIS — R19.5 HEME + STOOL: ICD-10-CM

## 2018-08-03 DIAGNOSIS — N17.9 ACUTE RENAL FAILURE ON DIALYSIS (HCC): ICD-10-CM

## 2018-08-03 DIAGNOSIS — D64.9 ANEMIA, UNSPECIFIED TYPE: ICD-10-CM

## 2018-08-03 DIAGNOSIS — Z87.898 HISTORY OF DIARRHEA: Primary | ICD-10-CM

## 2018-08-03 PROCEDURE — 1123F ACP DISCUSS/DSCN MKR DOCD: CPT | Performed by: NURSE PRACTITIONER

## 2018-08-03 PROCEDURE — 99213 OFFICE O/P EST LOW 20 MIN: CPT | Performed by: NURSE PRACTITIONER

## 2018-08-03 PROCEDURE — 4004F PT TOBACCO SCREEN RCVD TLK: CPT | Performed by: NURSE PRACTITIONER

## 2018-08-03 PROCEDURE — G8598 ASA/ANTIPLAT THER USED: HCPCS | Performed by: NURSE PRACTITIONER

## 2018-08-03 PROCEDURE — G8427 DOCREV CUR MEDS BY ELIG CLIN: HCPCS | Performed by: NURSE PRACTITIONER

## 2018-08-03 PROCEDURE — 1101F PT FALLS ASSESS-DOCD LE1/YR: CPT | Performed by: NURSE PRACTITIONER

## 2018-08-03 PROCEDURE — 4040F PNEUMOC VAC/ADMIN/RCVD: CPT | Performed by: NURSE PRACTITIONER

## 2018-08-03 PROCEDURE — 3017F COLORECTAL CA SCREEN DOC REV: CPT | Performed by: NURSE PRACTITIONER

## 2018-08-03 PROCEDURE — G8420 CALC BMI NORM PARAMETERS: HCPCS | Performed by: NURSE PRACTITIONER

## 2018-08-03 RX ORDER — DICYCLOMINE HYDROCHLORIDE 10 MG/1
10 CAPSULE ORAL 2 TIMES DAILY
COMMUNITY
End: 2018-10-10 | Stop reason: ALTCHOICE

## 2018-08-03 RX ORDER — HYDRALAZINE HYDROCHLORIDE 25 MG/1
25 TABLET, FILM COATED ORAL
COMMUNITY
End: 2018-12-04

## 2018-08-03 RX ORDER — FLUTICASONE PROPIONATE 50 MCG
1 SPRAY, SUSPENSION (ML) NASAL DAILY
COMMUNITY
End: 2018-10-10 | Stop reason: ALTCHOICE

## 2018-08-03 RX ORDER — TRAZODONE HYDROCHLORIDE 50 MG/1
50 TABLET ORAL NIGHTLY
COMMUNITY
End: 2018-10-10

## 2018-08-03 RX ORDER — CLONIDINE HYDROCHLORIDE 0.1 MG/1
0.1 TABLET ORAL 2 TIMES DAILY
COMMUNITY
End: 2018-12-04

## 2018-08-03 ASSESSMENT — ENCOUNTER SYMPTOMS
ABDOMINAL DISTENTION: 0
VOICE CHANGE: 0
VOMITING: 0
BLOOD IN STOOL: 0
CONSTIPATION: 0
BACK PAIN: 0
NAUSEA: 0
SORE THROAT: 1
SHORTNESS OF BREATH: 0
ABDOMINAL PAIN: 0
COUGH: 0
RECTAL PAIN: 0
ANAL BLEEDING: 0
DIARRHEA: 0
TROUBLE SWALLOWING: 0

## 2018-08-03 NOTE — PROGRESS NOTES
vomiting. Genitourinary: Negative for hematuria. Musculoskeletal: Negative for arthralgias, back pain and neck pain. Heal pain     Neurological: Positive for weakness (generalized). Negative for dizziness, light-headedness and headaches. Psychiatric/Behavioral: Negative for dysphoric mood and sleep disturbance. The patient is not nervous/anxious. All other systems reviewed and are negative. Objective:     Physical Exam   Constitutional: He is oriented to person, place, and time. He appears well-developed and well-nourished. BP (!) 130/58   Pulse 69   Ht 6' 1\" (1.854 m)   Wt 162 lb (73.5 kg)   SpO2 93%   BMI 21.37 kg/m²    Eyes: Conjunctivae and EOM are normal. Pupils are equal, round, and reactive to light. No scleral icterus. Neck: Normal range of motion. Neck supple. No thyromegaly present. Cardiovascular: Normal rate, regular rhythm and normal heart sounds. Exam reveals no gallop and no friction rub. No murmur heard. Pulmonary/Chest: Effort normal and breath sounds normal. No respiratory distress. Abdominal: Soft. Bowel sounds are normal. He exhibits no distension. There is no tenderness. There is no rebound. Musculoskeletal: He exhibits no edema or deformity. In wheelchair   Neurological: He is alert and oriented to person, place, and time. No cranial nerve deficit. Skin: No pallor. Psychiatric: He has a normal mood and affect. Judgment normal.   Nursing note and vitals reviewed. Assessment:       Diagnosis Orders   1. History of diarrhea     2. Heme + stool     3. Acute renal failure on dialysis ([facility]     4. Anemia, unspecified type           Plan:      1. Pt defers scheduling procedures at this time as well as a f/u appt. He wants to call our office when he is ready to make another appt to schedule procedures.

## 2018-08-08 ENCOUNTER — OFFICE VISIT (OUTPATIENT)
Dept: VASCULAR SURGERY | Age: 73
End: 2018-08-08

## 2018-08-08 ENCOUNTER — HOSPITAL ENCOUNTER (OUTPATIENT)
Dept: PREADMISSION TESTING | Age: 73
Discharge: HOME | End: 2018-08-12
Payer: MEDICARE

## 2018-08-08 ENCOUNTER — HOSPITAL ENCOUNTER (OUTPATIENT)
Dept: GENERAL RADIOLOGY | Age: 73
Discharge: HOME | End: 2018-08-08
Payer: MEDICARE

## 2018-08-08 VITALS — BODY MASS INDEX: 21.47 KG/M2 | HEIGHT: 73 IN | WEIGHT: 162 LBS

## 2018-08-08 VITALS
RESPIRATION RATE: 18 BRPM | DIASTOLIC BLOOD PRESSURE: 49 MMHG | HEART RATE: 64 BPM | SYSTOLIC BLOOD PRESSURE: 86 MMHG | TEMPERATURE: 97.2 F

## 2018-08-08 DIAGNOSIS — I74.8 ATHEROEMBOLISM (HCC): ICD-10-CM

## 2018-08-08 DIAGNOSIS — I70.262 ATHEROSCLEROSIS OF NATIVE ARTERY OF LEFT LOWER EXTREMITY WITH GANGRENE (HCC): Primary | ICD-10-CM

## 2018-08-08 DIAGNOSIS — Z01.818 PRE-OP TESTING: Primary | ICD-10-CM

## 2018-08-08 PROCEDURE — 99024 POSTOP FOLLOW-UP VISIT: CPT | Performed by: PHYSICIAN ASSISTANT

## 2018-08-08 PROCEDURE — 71046 X-RAY EXAM CHEST 2 VIEWS: CPT

## 2018-08-08 PROCEDURE — 87070 CULTURE OTHR SPECIMN AEROBIC: CPT

## 2018-08-08 RX ORDER — PANTOPRAZOLE SODIUM 40 MG/1
40 GRANULE, DELAYED RELEASE ORAL
COMMUNITY
End: 2018-10-01

## 2018-08-08 NOTE — PROGRESS NOTES
Massiel Yee is a 72 y.o. male who presents for post op evaluation. Patient had a  open AAA repair on 6/11/18. This was performed by Dr. Tatiana Lozyoa. He had atheroemboli to the left foot and had developed gangrene of all of the left toes. He also has an open wound on the  He comes in today for follow up wounds and discuss options with Dr. Tatiana Lozoya.      Massiel Yee is a 72 y.o. 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 ([address] 08/08/2018    History of diarrhea 08/03/2018    Acute renal failure on dialysis ([address] 08/03/2018    Anemia 08/03/2018    Atheroembolism ([address] 07/11/2018    Encephalopathy     Bradycardia     Colitis     HCAP (healthcare-associated pneumonia)     Heme + stool     SVT (supraventricular tachycardia) ([address] 06/22/2018    NORY (acute kidney injury) ([address] 06/22/2018    ATN (acute tubular necrosis) ([address] 06/22/2018    Leukocytosis 06/22/2018    Atrial flutter with rapid ventricular response (HCC)     Weakness 06/21/2018    Gait abnormality 06/21/2018    Pain in left leg 06/21/2018    S/P AAA repair 06/20/2018    Hypocalcemia 06/15/2018    Hyperkalemia 06/12/2018    Acute renal failure (ARF) ([address] 06/12/2018    Ischemic colitis (HCC)     High serum lactate     LFTs abnormal     Anemia     Internal hemorrhoid     AAA (abdominal aortic aneurysm) ([address] 06/11/2018    Type 2 diabetes mellitus with complication, without long-term current use of insulin ([address] 06/11/2018    Acute blood loss anemia 06/11/2018    Coagulopathy ([address] 06/11/2018    Essential hypertension 03/13/2017    Tobacco use 03/13/2017    Primary osteoarthritis involving multiple joints 03/13/2017    Mixed hyperlipidemia 03/13/2017    BPH (benign prostatic hyperplasia)      Current Outpatient Prescriptions   Medication Sig Dispense Refill    pantoprazole sodium (PROTONIX) 40 MG PACK packet Take 40 mg by mouth every morning (before breakfast)      hydrALAZINE (APRESOLINE) 25 MG tablet Take 25 mg by mouth      cloNIDine (CATAPRES) 0.1 MG tablet Take 0.1 mg by mouth 2 times daily      traZODone (DESYREL) 50 MG tablet Take 50 mg by mouth nightly      Pollen Extracts (PROSTAT PO) Take 30 mg by mouth 2 times daily      dicyclomine (BENTYL) 10 MG capsule Take 10 mg by mouth 2 times daily      fluticasone (FLONASE) 50 MCG/ACT nasal spray 1 spray by Nasal route daily      colestipol (COLESTID) 1 g tablet Take 1 g by mouth daily      ACETAMINOPHEN PO Take 650 mg by mouth      HYDROcodone-acetaminophen (NORCO) 5-325 MG per tablet Take 1 tablet by mouth every 6 hours as needed for Pain. Mikki Askew aspirin 81 MG EC tablet Take 1 tablet by mouth daily 30 tablet 0    amiodarone (CORDARONE) 200 MG tablet Take 1 tablet by mouth daily 30 tablet 0    ipratropium-albuterol (DUONEB) 0.5-2.5 (3) MG/3ML SOLN nebulizer solution Inhale 3 mLs into the lungs every 4 hours as needed for Shortness of Breath 360 mL 0    amLODIPine (NORVASC) 10 MG tablet Take 1 tablet by mouth daily 30 tablet 0    Multiple Vitamins-Minerals (MULTIVITAMIN ADULTS 50+) TABS Take by mouth daily      tamsulosin (FLOMAX) 0.4 MG capsule TAKE 1 CAPSULE BY MOUTH EVERY DAY 90 capsule 3    atorvastatin (LIPITOR) 20 MG tablet TAKE 1 TABLET BY MOUTH EVERY DAY 90 tablet 3     No current facility-administered medications for this visit. Allergies: Patient has no known allergies.   Past Medical History:   Diagnosis Date    Aneurysm ([facility]     Arthritis     BPH (benign prostatic hyperplasia)     CAD (coronary artery disease)     Chronic back pain     Diabetes (HCC)     type 1    Erectile dysfunction     GERD (gastroesophageal reflux disease)     Hemodialysis patient ([facility]     History of blood transfusion     Hyperlipidemia     Hypertension     Osteoarthritis     Palliative care encounter 06/25/2018     Past Surgical History:   Procedure Laterality Date    ABDOMEN SURGERY      APPENDECTOMY      BACK SURGERY      COLONOSCOPY      HC DIALYSIS CATHETER  6/12/2018    CATHETER INSERTION HEMODIALYSIS performed by Dipti Gallegos MD at [address] [facility] Left     JOINT REPLACEMENT      MASTOID SURGERY Left     NECK SURGERY      IN EXPLORATORY OF ABDOMEN N/A 6/12/2018    LAPAROTOMY EXPLORATORY performed by Lianna Jessica MD at [facility] ANEURYSM/GRFT INS,ABD AORT W/VISCER N/A 6/11/2018    OPEN REPAIR OF ABDOMINAL AORTIC ANEURYSM REPAIR WITH [id number] BYPASS WITH CELL SAVER performed by Dipti Gallegos MD at [address] [facility] FLX DX W/COLLJ SPEC BR/WA IF PFRMD N/A 6/12/2018    Dr Carpio-Likely ischemic colitis, internal hemorrhoids    TONSILLECTOMY      VASCULAR SURGERY  06/11/2018    DJM. AAA/lliac aneurysm repair, R fem EA/bypass    VASCULAR SURGERY  06/11/2018    DJM. Repair of abdominal aortic aneurysm and bilateral iliac aneurysm with aorta left iliac on the right and right femoral on the left with right common femoral endarterectomy with 18x9 gore-juliocesar bifurcated graft. Family History   Problem Relation Age of Onset    Other Mother     Cancer Father     Colon Cancer Neg Hx     Colon Polyps Neg Hx     Esophageal Cancer Neg Hx     Liver Cancer Neg Hx     Liver Disease Neg Hx     Rectal Cancer Neg Hx     Stomach Cancer Neg Hx      Social History   Substance Use Topics    Smoking status: Current Every Day Smoker     Packs/day: 0.00     Years: 50.00     Types: Cigarettes    Smokeless tobacco: Never Used      Comment: pt has not smoked since surgery    Alcohol use Yes      Comment: 1 beer per year     Review of Systems    Constitutional - no significant activity change, appetite change, or unexpected weight change. No fever or chills. No diaphoresis or significant fatigue. HENT - no significant rhinorrhea or epistaxis. No tinnitus or significant hearing loss.   Eyes - no

## 2018-08-09 ENCOUNTER — PREP FOR PROCEDURE (OUTPATIENT)
Dept: VASCULAR SURGERY | Age: 73
End: 2018-08-09

## 2018-08-09 LAB
MRSA CULTURE ONLY: ABNORMAL
MRSA CULTURE ONLY: ABNORMAL
ORGANISM: ABNORMAL

## 2018-08-09 RX ORDER — SODIUM CHLORIDE 0.9 % (FLUSH) 0.9 %
10 SYRINGE (ML) INJECTION EVERY 12 HOURS SCHEDULED
Status: CANCELLED | OUTPATIENT
Start: 2018-08-09 | End: 2019-08-09

## 2018-08-09 RX ORDER — VANCOMYCIN HYDROCHLORIDE 1 G/200ML
1000 INJECTION, SOLUTION INTRAVENOUS
Status: CANCELLED | OUTPATIENT
Start: 2018-08-09 | End: 2018-08-09

## 2018-08-09 RX ORDER — ASPIRIN 81 MG/1
81 TABLET ORAL ONCE
Status: CANCELLED | OUTPATIENT
Start: 2018-08-09 | End: 2018-08-09

## 2018-08-09 RX ORDER — SODIUM CHLORIDE 0.9 % (FLUSH) 0.9 %
10 SYRINGE (ML) INJECTION PRN
Status: CANCELLED | OUTPATIENT
Start: 2018-08-09 | End: 2019-08-09

## 2018-08-09 NOTE — H&P
Patient Care Team:  Bell Sultana DO as PCP - General (Pediatrics)  JESSICA Gardiner MD as Consulting Physician (Cardiology)     History and Physical:    Alcides York is a 72 y.o. male who presents for post op evaluation. Patient had a  open AAA repair on 6/11/18. This was performed by Dr. Oz Levy. He had atheroemboli to the left foot and had developed gangrene of all of the left toes. He also has an open wound on the  He comes in today for follow up wounds and discuss options with Dr. Oz Levy.      Alcides York is a 72 y.o. 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 ([address] 08/08/2018    History of diarrhea 08/03/2018    Acute renal failure on dialysis ([address] 08/03/2018    Anemia 08/03/2018    Atheroembolism ([address] 07/11/2018    Encephalopathy     Bradycardia     Colitis     HCAP (healthcare-associated pneumonia)     Heme + stool     SVT (supraventricular tachycardia) ([address] 06/22/2018    NORY (acute kidney injury) ([address] 06/22/2018    ATN (acute tubular necrosis) ([address] 06/22/2018    Leukocytosis 06/22/2018    Atrial flutter with rapid ventricular response (HCC)     Weakness 06/21/2018    Gait abnormality 06/21/2018    Pain in left leg 06/21/2018    S/P AAA repair 06/20/2018    Hypocalcemia 06/15/2018    Hyperkalemia 06/12/2018    Acute renal failure (ARF) ([address] 06/12/2018    Ischemic colitis (HCC)     High serum lactate     LFTs abnormal     Anemia     Internal hemorrhoid     AAA (abdominal aortic aneurysm) ([address] 06/11/2018    Type 2 diabetes mellitus with complication, without long-term current use of insulin ([address] 06/11/2018    Acute blood loss anemia 06/11/2018    Coagulopathy ([address] 06/11/2018    Essential hypertension 03/13/2017    Tobacco use 03/13/2017    Primary osteoarthritis involving multiple joints 03/13/2017    Mixed hyperlipidemia 03/13/2017    BPH (benign prostatic hyperplasia)      Current Outpatient Prescriptions   Medication Sig Dispense Refill    pantoprazole sodium (PROTONIX) 40 MG PACK packet Take 40 mg by mouth every morning (before breakfast)      hydrALAZINE (APRESOLINE) 25 MG tablet Take 25 mg by mouth      cloNIDine (CATAPRES) 0.1 MG tablet Take 0.1 mg by mouth 2 times daily      traZODone (DESYREL) 50 MG tablet Take 50 mg by mouth nightly      Pollen Extracts (PROSTAT PO) Take 30 mg by mouth 2 times daily      dicyclomine (BENTYL) 10 MG capsule Take 10 mg by mouth 2 times daily      fluticasone (FLONASE) 50 MCG/ACT nasal spray 1 spray by Nasal route daily      colestipol (COLESTID) 1 g tablet Take 1 g by mouth daily      ACETAMINOPHEN PO Take 650 mg by mouth      HYDROcodone-acetaminophen (NORCO) 5-325 MG per tablet Take 1 tablet by mouth every 6 hours as needed for Pain. [place] aspirin 81 MG EC tablet Take 1 tablet by mouth daily 30 tablet 0    amiodarone (CORDARONE) 200 MG tablet Take 1 tablet by mouth daily 30 tablet 0    ipratropium-albuterol (DUONEB) 0.5-2.5 (3) MG/3ML SOLN nebulizer solution Inhale 3 mLs into the lungs every 4 hours as needed for Shortness of Breath 360 mL 0    amLODIPine (NORVASC) 10 MG tablet Take 1 tablet by mouth daily 30 tablet 0    Multiple Vitamins-Minerals (MULTIVITAMIN ADULTS 50+) TABS Take by mouth daily      tamsulosin (FLOMAX) 0.4 MG capsule TAKE 1 CAPSULE BY MOUTH EVERY DAY 90 capsule 3    atorvastatin (LIPITOR) 20 MG tablet TAKE 1 TABLET BY MOUTH EVERY DAY 90 tablet 3     No current facility-administered medications for this visit. Allergies: Patient has no known allergies.   Past Medical History:   Diagnosis Date    [facility]m [facility]     Arthritis     BPH (benign prostatic hyperplasia)     CAD (coronary artery disease)     Chronic back pain     Diabetes (HCC)     type 1    Erectile dysfunction     GERD (gastroesophageal reflux disease)    

## 2018-08-10 DIAGNOSIS — Z22.322 MRSA (METHICILLIN RESISTANT STAPH AUREUS) CULTURE POSITIVE: Primary | ICD-10-CM

## 2018-08-10 NOTE — PROGRESS NOTES
Nursing home also called wanting to know if it would be ok to proceed with surgery as Pt has current UTI. I discussed his with the surgeon and we are ok to proceed.  OB

## 2018-08-13 ENCOUNTER — HOSPITAL ENCOUNTER (OUTPATIENT)
Age: 73
Setting detail: OUTPATIENT SURGERY
Discharge: HOME | End: 2018-08-13
Attending: SURGERY | Admitting: SURGERY
Payer: MEDICARE

## 2018-08-13 ENCOUNTER — ANESTHESIA EVENT (OUTPATIENT)
Dept: OPERATING ROOM | Age: 73
End: 2018-08-13
Payer: MEDICARE

## 2018-08-13 ENCOUNTER — ANESTHESIA (OUTPATIENT)
Dept: OPERATING ROOM | Age: 73
End: 2018-08-13
Payer: MEDICARE

## 2018-08-13 VITALS
RESPIRATION RATE: 16 BRPM | HEIGHT: 73 IN | TEMPERATURE: 99 F | WEIGHT: 162 LBS | HEART RATE: 86 BPM | BODY MASS INDEX: 21.47 KG/M2 | OXYGEN SATURATION: 98 % | SYSTOLIC BLOOD PRESSURE: 130 MMHG | DIASTOLIC BLOOD PRESSURE: 76 MMHG

## 2018-08-13 VITALS
OXYGEN SATURATION: 100 % | RESPIRATION RATE: 7 BRPM | SYSTOLIC BLOOD PRESSURE: 113 MMHG | DIASTOLIC BLOOD PRESSURE: 69 MMHG

## 2018-08-13 PROBLEM — I96 GANGRENE OF FOOT (HCC): Status: ACTIVE | Noted: 2018-08-13

## 2018-08-13 LAB
ANION GAP SERPL CALCULATED.3IONS-SCNC: 14 MMOL/L (ref 7–19)
APTT: 33.3 SEC (ref 26–36.2)
BUN BLDV-MCNC: 26 MG/DL (ref 8–23)
CALCIUM SERPL-MCNC: 9.1 MG/DL (ref 8.8–10.2)
CHLORIDE BLD-SCNC: 96 MMOL/L (ref 98–111)
CO2: 32 MMOL/L (ref 22–29)
CREAT SERPL-MCNC: 3.9 MG/DL (ref 0.5–1.2)
GFR NON-AFRICAN AMERICAN: 15
GLUCOSE BLD-MCNC: 127 MG/DL (ref 74–109)
HCT VFR BLD CALC: 33.1 % (ref 42–52)
HEMOGLOBIN: 10.4 G/DL (ref 14–18)
INR BLD: 1.03 (ref 0.88–1.18)
MCH RBC QN AUTO: 29.8 PG (ref 27–31)
MCHC RBC AUTO-ENTMCNC: 31.4 G/DL (ref 33–37)
MCV RBC AUTO: 94.8 FL (ref 80–94)
PDW BLD-RTO: 16.2 % (ref 11.5–14.5)
PLATELET # BLD: 384 K/UL (ref 130–400)
PMV BLD AUTO: 9.8 FL (ref 9.4–12.4)
POTASSIUM SERPL-SCNC: 3.4 MMOL/L (ref 3.5–4.9)
PROTHROMBIN TIME: 13.4 SEC (ref 12–14.6)
RBC # BLD: 3.49 M/UL (ref 4.7–6.1)
SODIUM BLD-SCNC: 142 MMOL/L (ref 136–145)
WBC # BLD: 9.5 K/UL (ref 4.8–10.8)

## 2018-08-13 PROCEDURE — 7100000000 HC PACU RECOVERY - FIRST 15 MIN: Performed by: SURGERY

## 2018-08-13 PROCEDURE — 6370000000 HC RX 637 (ALT 250 FOR IP): Performed by: SURGERY

## 2018-08-13 PROCEDURE — 7100000001 HC PACU RECOVERY - ADDTL 15 MIN: Performed by: SURGERY

## 2018-08-13 PROCEDURE — 3600000012 HC SURGERY LEVEL 2 ADDTL 15MIN: Performed by: SURGERY

## 2018-08-13 PROCEDURE — 36415 COLL VENOUS BLD VENIPUNCTURE: CPT

## 2018-08-13 PROCEDURE — 2580000003 HC RX 258: Performed by: SURGERY

## 2018-08-13 PROCEDURE — 7100000011 HC PHASE II RECOVERY - ADDTL 15 MIN: Performed by: SURGERY

## 2018-08-13 PROCEDURE — 2780000010 HC IMPLANT OTHER: Performed by: SURGERY

## 2018-08-13 PROCEDURE — 6360000002 HC RX W HCPCS: Performed by: PHYSICIAN ASSISTANT

## 2018-08-13 PROCEDURE — 85027 COMPLETE CBC AUTOMATED: CPT

## 2018-08-13 PROCEDURE — 80048 BASIC METABOLIC PNL TOTAL CA: CPT

## 2018-08-13 PROCEDURE — 28805 AMPUTATION THRU METATARSAL: CPT | Performed by: SURGERY

## 2018-08-13 PROCEDURE — 88305 TISSUE EXAM BY PATHOLOGIST: CPT

## 2018-08-13 PROCEDURE — 2709999900 HC NON-CHARGEABLE SUPPLY: Performed by: SURGERY

## 2018-08-13 PROCEDURE — 85610 PROTHROMBIN TIME: CPT

## 2018-08-13 PROCEDURE — 2500000003 HC RX 250 WO HCPCS: Performed by: NURSE ANESTHETIST, CERTIFIED REGISTERED

## 2018-08-13 PROCEDURE — 7100000010 HC PHASE II RECOVERY - FIRST 15 MIN: Performed by: SURGERY

## 2018-08-13 PROCEDURE — 6360000002 HC RX W HCPCS: Performed by: NURSE ANESTHETIST, CERTIFIED REGISTERED

## 2018-08-13 PROCEDURE — 3700000000 HC ANESTHESIA ATTENDED CARE: Performed by: SURGERY

## 2018-08-13 PROCEDURE — 3600000002 HC SURGERY LEVEL 2 BASE: Performed by: SURGERY

## 2018-08-13 PROCEDURE — 85730 THROMBOPLASTIN TIME PARTIAL: CPT

## 2018-08-13 PROCEDURE — 3700000001 HC ADD 15 MINUTES (ANESTHESIA): Performed by: SURGERY

## 2018-08-13 RX ORDER — EPHEDRINE SULFATE 50 MG/ML
INJECTION, SOLUTION INTRAVENOUS PRN
Status: DISCONTINUED | OUTPATIENT
Start: 2018-08-13 | End: 2018-08-13 | Stop reason: SDUPTHER

## 2018-08-13 RX ORDER — ONDANSETRON 2 MG/ML
4 INJECTION INTRAMUSCULAR; INTRAVENOUS EVERY 6 HOURS PRN
Status: DISCONTINUED | OUTPATIENT
Start: 2018-08-13 | End: 2018-08-13 | Stop reason: HOSPADM

## 2018-08-13 RX ORDER — VANCOMYCIN HYDROCHLORIDE 1 G/200ML
1000 INJECTION, SOLUTION INTRAVENOUS
Status: COMPLETED | OUTPATIENT
Start: 2018-08-13 | End: 2018-08-13

## 2018-08-13 RX ORDER — FENTANYL CITRATE 50 UG/ML
50 INJECTION, SOLUTION INTRAMUSCULAR; INTRAVENOUS
Status: DISCONTINUED | OUTPATIENT
Start: 2018-08-13 | End: 2018-08-13 | Stop reason: HOSPADM

## 2018-08-13 RX ORDER — PROPOFOL 10 MG/ML
INJECTION, EMULSION INTRAVENOUS PRN
Status: DISCONTINUED | OUTPATIENT
Start: 2018-08-13 | End: 2018-08-13 | Stop reason: SDUPTHER

## 2018-08-13 RX ORDER — LIDOCAINE HYDROCHLORIDE 10 MG/ML
INJECTION, SOLUTION EPIDURAL; INFILTRATION; INTRACAUDAL; PERINEURAL PRN
Status: DISCONTINUED | OUTPATIENT
Start: 2018-08-13 | End: 2018-08-13 | Stop reason: SDUPTHER

## 2018-08-13 RX ORDER — SODIUM CHLORIDE 450 MG/100ML
INJECTION, SOLUTION INTRAVENOUS CONTINUOUS
Status: DISCONTINUED | OUTPATIENT
Start: 2018-08-13 | End: 2018-08-13 | Stop reason: HOSPADM

## 2018-08-13 RX ORDER — SODIUM CHLORIDE 0.9 % (FLUSH) 0.9 %
10 SYRINGE (ML) INJECTION EVERY 12 HOURS SCHEDULED
Status: DISCONTINUED | OUTPATIENT
Start: 2018-08-13 | End: 2018-08-13 | Stop reason: HOSPADM

## 2018-08-13 RX ORDER — SODIUM CHLORIDE, SODIUM LACTATE, POTASSIUM CHLORIDE, CALCIUM CHLORIDE 600; 310; 30; 20 MG/100ML; MG/100ML; MG/100ML; MG/100ML
INJECTION, SOLUTION INTRAVENOUS CONTINUOUS
Status: DISCONTINUED | OUTPATIENT
Start: 2018-08-13 | End: 2018-08-13 | Stop reason: HOSPADM

## 2018-08-13 RX ORDER — HYDROMORPHONE HCL IN 0.9% NACL 0.5 MG/ML
0.25 SYRINGE (ML) INTRAVENOUS EVERY 5 MIN PRN
Status: DISCONTINUED | OUTPATIENT
Start: 2018-08-13 | End: 2018-08-13 | Stop reason: HOSPADM

## 2018-08-13 RX ORDER — ASPIRIN 81 MG/1
81 TABLET ORAL ONCE
Status: DISCONTINUED | OUTPATIENT
Start: 2018-08-13 | End: 2018-08-13 | Stop reason: HOSPADM

## 2018-08-13 RX ORDER — ACETAMINOPHEN 325 MG/1
650 TABLET ORAL EVERY 4 HOURS PRN
Status: DISCONTINUED | OUTPATIENT
Start: 2018-08-13 | End: 2018-08-13 | Stop reason: HOSPADM

## 2018-08-13 RX ORDER — HYDROCODONE BITARTRATE AND ACETAMINOPHEN 5; 325 MG/1; MG/1
1 TABLET ORAL EVERY 4 HOURS PRN
Status: DISCONTINUED | OUTPATIENT
Start: 2018-08-13 | End: 2018-08-13 | Stop reason: HOSPADM

## 2018-08-13 RX ORDER — HYDROMORPHONE HCL IN 0.9% NACL 0.5 MG/ML
0.5 SYRINGE (ML) INTRAVENOUS EVERY 5 MIN PRN
Status: DISCONTINUED | OUTPATIENT
Start: 2018-08-13 | End: 2018-08-13 | Stop reason: HOSPADM

## 2018-08-13 RX ORDER — MEPERIDINE HYDROCHLORIDE 50 MG/ML
12.5 INJECTION INTRAMUSCULAR; INTRAVENOUS; SUBCUTANEOUS EVERY 5 MIN PRN
Status: DISCONTINUED | OUTPATIENT
Start: 2018-08-13 | End: 2018-08-13 | Stop reason: HOSPADM

## 2018-08-13 RX ORDER — MORPHINE SULFATE 1 MG/ML
4 INJECTION, SOLUTION EPIDURAL; INTRATHECAL; INTRAVENOUS EVERY 5 MIN PRN
Status: DISCONTINUED | OUTPATIENT
Start: 2018-08-13 | End: 2018-08-13 | Stop reason: HOSPADM

## 2018-08-13 RX ORDER — HYDROCODONE BITARTRATE AND ACETAMINOPHEN 5; 325 MG/1; MG/1
TABLET ORAL
Status: DISCONTINUED
Start: 2018-08-13 | End: 2018-08-13 | Stop reason: HOSPADM

## 2018-08-13 RX ORDER — LIDOCAINE HYDROCHLORIDE 10 MG/ML
1 INJECTION, SOLUTION EPIDURAL; INFILTRATION; INTRACAUDAL; PERINEURAL
Status: DISCONTINUED | OUTPATIENT
Start: 2018-08-13 | End: 2018-08-13 | Stop reason: HOSPADM

## 2018-08-13 RX ORDER — MIDAZOLAM HYDROCHLORIDE 1 MG/ML
2 INJECTION INTRAMUSCULAR; INTRAVENOUS
Status: DISCONTINUED | OUTPATIENT
Start: 2018-08-13 | End: 2018-08-13 | Stop reason: HOSPADM

## 2018-08-13 RX ORDER — SODIUM CHLORIDE 0.9 % (FLUSH) 0.9 %
10 SYRINGE (ML) INJECTION PRN
Status: DISCONTINUED | OUTPATIENT
Start: 2018-08-13 | End: 2018-08-13 | Stop reason: HOSPADM

## 2018-08-13 RX ORDER — PROMETHAZINE HYDROCHLORIDE 25 MG/ML
6.25 INJECTION, SOLUTION INTRAMUSCULAR; INTRAVENOUS
Status: DISCONTINUED | OUTPATIENT
Start: 2018-08-13 | End: 2018-08-13 | Stop reason: HOSPADM

## 2018-08-13 RX ORDER — METOCLOPRAMIDE HYDROCHLORIDE 5 MG/ML
10 INJECTION INTRAMUSCULAR; INTRAVENOUS
Status: DISCONTINUED | OUTPATIENT
Start: 2018-08-13 | End: 2018-08-13 | Stop reason: HOSPADM

## 2018-08-13 RX ORDER — BISMUTH TRIBROMOPH/PETROLATUM 5"X9"
BANDAGE TOPICAL PRN
Status: DISCONTINUED | OUTPATIENT
Start: 2018-08-13 | End: 2018-08-13 | Stop reason: HOSPADM

## 2018-08-13 RX ORDER — FENTANYL CITRATE 50 UG/ML
25 INJECTION, SOLUTION INTRAMUSCULAR; INTRAVENOUS
Status: DISCONTINUED | OUTPATIENT
Start: 2018-08-13 | End: 2018-08-13 | Stop reason: HOSPADM

## 2018-08-13 RX ORDER — HYDRALAZINE HYDROCHLORIDE 20 MG/ML
5 INJECTION INTRAMUSCULAR; INTRAVENOUS EVERY 10 MIN PRN
Status: DISCONTINUED | OUTPATIENT
Start: 2018-08-13 | End: 2018-08-13 | Stop reason: HOSPADM

## 2018-08-13 RX ORDER — DIPHENHYDRAMINE HYDROCHLORIDE 50 MG/ML
12.5 INJECTION INTRAMUSCULAR; INTRAVENOUS
Status: DISCONTINUED | OUTPATIENT
Start: 2018-08-13 | End: 2018-08-13 | Stop reason: HOSPADM

## 2018-08-13 RX ORDER — ATORVASTATIN CALCIUM 20 MG/1
20 TABLET, FILM COATED ORAL DAILY
COMMUNITY
End: 2018-11-01 | Stop reason: SDUPTHER

## 2018-08-13 RX ORDER — LABETALOL HYDROCHLORIDE 5 MG/ML
5 INJECTION, SOLUTION INTRAVENOUS EVERY 10 MIN PRN
Status: DISCONTINUED | OUTPATIENT
Start: 2018-08-13 | End: 2018-08-13 | Stop reason: HOSPADM

## 2018-08-13 RX ORDER — HYDROCODONE BITARTRATE AND ACETAMINOPHEN 5; 325 MG/1; MG/1
2 TABLET ORAL EVERY 4 HOURS PRN
Status: DISCONTINUED | OUTPATIENT
Start: 2018-08-13 | End: 2018-08-13 | Stop reason: HOSPADM

## 2018-08-13 RX ORDER — FENTANYL CITRATE 50 UG/ML
INJECTION, SOLUTION INTRAMUSCULAR; INTRAVENOUS PRN
Status: DISCONTINUED | OUTPATIENT
Start: 2018-08-13 | End: 2018-08-13 | Stop reason: SDUPTHER

## 2018-08-13 RX ORDER — MORPHINE SULFATE 1 MG/ML
2 INJECTION, SOLUTION EPIDURAL; INTRATHECAL; INTRAVENOUS EVERY 5 MIN PRN
Status: DISCONTINUED | OUTPATIENT
Start: 2018-08-13 | End: 2018-08-13 | Stop reason: HOSPADM

## 2018-08-13 RX ADMIN — LIDOCAINE HYDROCHLORIDE 50 MG: 10 INJECTION, SOLUTION EPIDURAL; INFILTRATION; INTRACAUDAL; PERINEURAL at 11:23

## 2018-08-13 RX ADMIN — VANCOMYCIN HYDROCHLORIDE 1000 MG: 1 INJECTION, SOLUTION INTRAVENOUS at 10:56

## 2018-08-13 RX ADMIN — SODIUM CHLORIDE: 4.5 INJECTION, SOLUTION INTRAVENOUS at 10:19

## 2018-08-13 RX ADMIN — EPHEDRINE SULFATE 20 MG: 50 INJECTION, SOLUTION INTRAMUSCULAR; INTRAVENOUS; SUBCUTANEOUS at 11:41

## 2018-08-13 RX ADMIN — EPHEDRINE SULFATE 10 MG: 50 INJECTION, SOLUTION INTRAMUSCULAR; INTRAVENOUS; SUBCUTANEOUS at 11:54

## 2018-08-13 RX ADMIN — HYDROCODONE BITARTRATE AND ACETAMINOPHEN 2 TABLET: 5; 325 TABLET ORAL at 13:27

## 2018-08-13 RX ADMIN — EPHEDRINE SULFATE 20 MG: 50 INJECTION, SOLUTION INTRAMUSCULAR; INTRAVENOUS; SUBCUTANEOUS at 11:58

## 2018-08-13 RX ADMIN — FENTANYL CITRATE 50 MCG: 50 INJECTION INTRAMUSCULAR; INTRAVENOUS at 11:26

## 2018-08-13 RX ADMIN — Medication 0.5 MG: at 12:34

## 2018-08-13 RX ADMIN — PROPOFOL 150 MG: 10 INJECTION, EMULSION INTRAVENOUS at 11:23

## 2018-08-13 RX ADMIN — FENTANYL CITRATE 50 MCG: 50 INJECTION INTRAMUSCULAR; INTRAVENOUS at 11:21

## 2018-08-13 ASSESSMENT — PAIN - FUNCTIONAL ASSESSMENT: PAIN_FUNCTIONAL_ASSESSMENT: 0-10

## 2018-08-13 ASSESSMENT — PAIN SCALES - GENERAL
PAINLEVEL_OUTOF10: 6
PAINLEVEL_OUTOF10: 7

## 2018-08-13 ASSESSMENT — LIFESTYLE VARIABLES: SMOKING_STATUS: 0

## 2018-08-13 NOTE — H&P (VIEW-ONLY)
Patient Care Team:  [address] [facility],[address], DO as PCP - General (Pediatrics)  JESSICA West MD as Consulting Physician (Cardiology)     History and Physical:    Nav Rush is a 72 y.o. male who presents for post op evaluation. Patient had a  open AAA repair on 6/11/18. This was performed by Dr. Ilsa Parekh. He had atheroemboli to the left foot and had developed gangrene of all of the left toes. He also has an open wound on the  He comes in today for follow up wounds and discuss options with Dr. Ilsa Parekh.      Nav Rush is a 72 y.o. male with the following history as [facility] in 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 ([address] 08/08/2018    History of diarrhea 08/03/2018    Acute renal failure on dialysis ([address] 08/03/2018    Anemia 08/03/2018    Atheroembolism ([address] 07/11/2018    Encephalopathy     Bradycardia     Colitis     HCAP (healthcare-associated pneumonia)     Heme + stool     SVT (supraventricular tachycardia) ([address] 06/22/2018    NORY (acute kidney injury) ([address] 06/22/2018    ATN (acute tubular necrosis) ([address] 06/22/2018    Leukocytosis 06/22/2018    Atrial flutter with rapid ventricular response (HCC)     Weakness 06/21/2018    Gait abnormality 06/21/2018    Pain in left leg 06/21/2018    S/P AAA repair 06/20/2018    Hypocalcemia 06/15/2018    Hyperkalemia 06/12/2018    Acute renal failure (ARF) ([address] 06/12/2018    Ischemic colitis (HCC)     High serum lactate     LFTs abnormal     Anemia     Internal hemorrhoid     AAA (abdominal aortic aneurysm) ([address] 06/11/2018    Type 2 diabetes mellitus with complication, without long-term current use of insulin ([address] 06/11/2018    Acute blood loss anemia 06/11/2018    Coagulopathy ([address] 06/11/2018    Essential hypertension 03/13/2017    Tobacco use 03/13/2017    Primary osteoarthritis involving multiple joints 03/13/2017    Mixed hyperlipidemia 03/13/2017    BPH (benign prostatic hyperplasia)      Current Outpatient Prescriptions   Medication Sig Dispense Refill    pantoprazole sodium (PROTONIX) 40 MG PACK packet Take 40 mg by mouth every morning (before breakfast)      hydrALAZINE (APRESOLINE) 25 MG tablet Take 25 mg by mouth      cloNIDine (CATAPRES) 0.1 MG tablet Take 0.1 mg by mouth 2 times daily      traZODone (DESYREL) 50 MG tablet Take 50 mg by mouth nightly      Pollen Extracts (PROSTAT PO) Take 30 mg by mouth 2 times daily      dicyclomine (BENTYL) 10 MG capsule Take 10 mg by mouth 2 times daily      fluticasone (FLONASE) 50 MCG/ACT nasal spray 1 spray by Nasal route daily      colestipol (COLESTID) 1 g tablet Take 1 g by mouth daily      ACETAMINOPHEN PO Take 650 mg by mouth      HYDROcodone-acetaminophen (NORCO) 5-325 MG per tablet Take 1 tablet by mouth every 6 hours as needed for Pain. [place] aspirin 81 MG EC tablet Take 1 tablet by mouth daily 30 tablet 0    amiodarone (CORDARONE) 200 MG tablet Take 1 tablet by mouth daily 30 tablet 0    ipratropium-albuterol (DUONEB) 0.5-2.5 (3) MG/3ML SOLN nebulizer solution Inhale 3 mLs into the lungs every 4 hours as needed for Shortness of Breath 360 mL 0    amLODIPine (NORVASC) 10 MG tablet Take 1 tablet by mouth daily 30 tablet 0    Multiple Vitamins-Minerals (MULTIVITAMIN ADULTS 50+) TABS Take by mouth daily      tamsulosin (FLOMAX) 0.4 MG capsule TAKE 1 CAPSULE BY MOUTH EVERY DAY 90 capsule 3    atorvastatin (LIPITOR) 20 MG tablet TAKE 1 TABLET BY MOUTH EVERY DAY 90 tablet 3     No current facility-administered medications for this visit. Allergies: Patient has no known allergies.   Past Medical History:   Diagnosis Date    [facility]     Arthritis     BPH (benign prostatic hyperplasia)     CAD (coronary artery disease)     Chronic back pain     Diabetes (HCC)     type 1    Erectile dysfunction     GERD (gastroesophageal reflux disease)     Hemod[facility] patient [facility])     History of blood transfusion     Hyperlipidemia     Hypertension     Osteoarthritis     Palliative care encounter 06/25/2018     Past Surgical History:   Procedure Laterality Date    ABDOMEN SURGERY      APPENDECTOMY      BACK SURGERY      COLONOSCOPY      HC DIALYSIS CATHETER  6/12/2018    CATHETER INSERTION HEMODIALYSIS performed by Angie Huerta MD at [address] [facility] Left     JOINT REPLACEMENT      MASTOID SURGERY Left     NECK SURGERY      MD EXPLORATORY OF ABDOMEN N/A 6/12/2018    LAPAROTOMY EXPLORATORY performed by Amrita Gonzalez MD at [facility] ANEURYSM/GRFT INS,ABD AORT W/VISCER N/A 6/11/2018    OPEN REPAIR OF ABDOMINAL AORTIC ANEURYSM REPAIR WITH [id number] BYPASS WITH CELL SAVER performed by Angie Huerta MD at [address] [facility] FLX DX W/COLLJ SPEC BR/WA IF PFRMD N/A 6/12/2018    Dr Carpio-Likely ischemic colitis, internal hemorrhoids    TONSILLECTOMY      VASCULAR SURGERY  06/11/2018    DJM. AAA/lliac aneurysm repair, R fem EA/bypass    VASCULAR SURGERY  06/11/2018    DJM. Repair of abdominal aortic aneurysm and bilateral iliac aneurysm with aorta left iliac on the right and right femoral on the left with right common femoral endarterectomy with 18x9 gore-juliocesar bifurcated graft.      Family History   Problem Relation Age of Onset    Other Mother     Cancer Father     Colon Cancer Neg Hx     Colon Polyps Neg Hx     Esophageal Cancer Neg Hx     Liver Cancer Neg Hx     Liver Disease Neg Hx     Rectal Cancer Neg Hx     Stomach Cancer Neg Hx      Social History   Substance Use Topics    Smoking status: Current Every Day Smoker     Packs/day: 0.00     Years: 50.00     Types: Cigarettes    Smokeless tobacco: Never Used      Comment: pt has not smoked since surgery    Alcohol use Yes      Comment: 1 beer per year     Review of Systems    Constitutional  no significant activity change, appetite change, or unexpected weight GI - Abdomen  soft, non tender, bowel sounds X 4 quadrants. No guarding or rebound tenderness. No distension or palpable mass. Genitourinary  deferred. Musculoskeletal  ROM appears normal.  No significant edema. Neurologic  alert and oriented X 3. Physiologic. Skin  warm, dry, and intact. No rash, erythema, or pallor. On evaluation today, incision is healed. He has dry gangrene of all of his toes on the left foot and an area on the plantar aspect of the. There is no drainage or redness. Palpable DP pulse noted. He also has areas of hyper pigmentary changes noted on the buttocks. He has an open wound with slough noted in the wound bed left buttocks. Psychiatric  mood, affect, and behavior appear normal.  Judgment and thought processes appear normal.      Options have been discussed by Dr. Carlota Padilla with the patient including continued medical management vs. proceeding with a left TMA. Risks have been discussed with the patient including but not limited to MI, death, CVA, bleed, nerve injury, and infection. Patient is agreeable to proceed with left TMA. Assessment -   1. Atherosclerosis of native arteries with gangrenous left toes  2.  Open wound left buttocks      Plan -     Continue ASA and statin daily  Proceed with left TMA

## 2018-08-13 NOTE — ANESTHESIA PRE PROCEDURE
tablet by mouth daily 6/30/18   Radha Pearson MD   Multiple Vitamins-Minerals (MULTIVITAMIN ADULTS 50+) TABS Take by mouth daily    Historical Provider, MD   tamsulosin (FLOMAX) 0.4 MG capsule TAKE 1 CAPSULE BY MOUTH EVERY DAY 3/16/18   MATTHEW Adame DO       Current medications:    Current Facility-Administered Medications   Medication Dose Route Frequency Provider Last Rate Last Dose    0.45 % sodium chloride infusion   Intravenous Continuous Az Bernstein MD 40 mL/hr at 08/13/18 1019         Allergies:  No Known Allergies    Problem List:    Patient Active Problem List   Diagnosis Code    BPH (benign prostatic hyperplasia) N40.0    Essential hypertension I10    Tobacco use Z72.0    Primary osteoarthritis involving multiple joints M15.0    Mixed hyperlipidemia E78.2    AAA (abdominal aortic aneurysm) ([facility] I71.4    Type 2 diabetes mellitus with complication, without long-term current use of insulin ([facility]) E11.8    Acute blood loss anemia D62    Coagulopathy ([facility]) D68.9    Hyperkalemia E87.5    Acute renal failure (ARF) ([facility]) N17.9    Ischemic colitis ([email or web address] K55.9    High serum lactate R79.89    LFTs abnormal R94.5    Anemia D64.9    Internal hemorrhoid K64.8    Hypocalcemia E83.51    S/P AAA repair Z98.890, Z86.79    Weakness R53.1    Gait abnormality R26.9    Pain in left leg M79.605    SVT (supraventricular tachycardia) ([facility]) I47.1    NORY (acute kidney injury) ([facility] N17.9    ATN (acute tubular necrosis) ([facility]) N17.0    Leukocytosis D72.829    Atrial flutter with rapid ventricular response ([facility]) I48.92    Heme + stool R19.5    Bradycardia R00.1    Colitis K52.9    HCAP (healthcare-associated pneumonia) J18.9    Encephalopathy G93.40    Atheroembolism ([facility]) I74.8    History of diarrhea Z87.898    Acute renal failure on dialysis ([facility]) N17.9, Z99.2    Anemia D64.9    Atherosclerosis of native artery of left lower extremity with gangrene ([facility] I70.262       Past Medical History:        Diagnosis Date    Aneurysm ([facility]     Arthritis     BPH (benign prostatic hyperplasia)     CAD (coronary artery disease)     Chronic back pain     Diabetes ([facility]     no meds at present.  Erectile dysfunction     GERD (gastroesophageal reflux disease)     Hemodialysis patient ([facility]     [facility] History of blood transfusion     Hyperlipidemia     Hypertension     Osteoarthritis     Palliative care encounter 06/25/2018       Past Surgical History:        Procedure Laterality Date    ABDOMEN SURGERY      APPENDECTOMY      BACK SURGERY      COLONOSCOPY      HC DIALYSIS CATHETER  6/12/2018    CATHETER INSERTION HEMODIALYSIS performed by Queta Fowler MD at [address] [facility] Left     JOINT REPLACEMENT      MASTOID SURGERY Left     NECK SURGERY      ND EXPLORATORY OF ABDOMEN N/A 6/12/2018    LAPAROTOMY EXPLORATORY performed by Luis Faith MD at [facility] 11 ANEURYSM/GRFT INS,ABD AORT W/VISCER N/A 6/11/2018    OPEN REPAIR OF ABDOMINAL AORTIC ANEURYSM REPAIR WITH [id number] BYPASS WITH CELL SAVER performed by Queta Fowler MD at [address] [facility] FLX DX W/COLLJ SPEC BR/WA IF PFRMD N/A 6/12/2018    Dr Carpio-Likely ischemic colitis, internal hemorrhoids    TONSILLECTOMY      VASCULAR SURGERY  06/11/2018    DJM. AAA/lliac aneurysm repair, R fem EA/bypass    VASCULAR SURGERY  06/11/2018    DJM. Repair of abdominal aortic aneurysm and bilateral iliac aneurysm with aorta left iliac on the right and right femoral on the left with right common femoral endarterectomy with 18x9 gore-juliocesar bifurcated graft.        Social History:    Social History   Substance Use Topics    Smoking status: Former Smoker     Packs/day: 0.00     Years: 50.00     Types: Cigarettes    Smokeless tobacco: Never Used      Comment: pt has not smoked since surgery    Alcohol use Yes      Comment: 1 beer per year                                Counseling given: Not Answered      Vital Signs (Current): There were no vitals filed for this visit. BP Readings from Last 3 Encounters:   08/08/18 (!) 86/49   08/03/18 (!) 130/58   07/30/18 127/63       NPO Status:                                                                                 BMI:   Wt Readings from Last 3 Encounters:   08/08/18 162 lb (73.5 kg)   08/03/18 162 lb (73.5 kg)   07/16/18 184 lb (83.5 kg)     There is no height or weight on file to calculate BMI.    CBC:   Lab Results   Component Value Date    WBC 9.9 06/29/2018    RBC 2.72 06/29/2018    HGB 7.8 06/29/2018    HCT 26.1 06/29/2018    MCV 96.0 06/29/2018    RDW 15.8 06/29/2018     06/29/2018       CMP:   Lab Results   Component Value Date     06/29/2018    K 3.9 06/29/2018    K 4.2 06/21/2018    CL 94 06/29/2018    CO2 22 06/29/2018    BUN 38 06/29/2018    CREATININE 8.2 06/29/2018    LABGLOM 6 06/29/2018    GLUCOSE 100 06/29/2018    PROT 5.1 06/25/2018    CALCIUM 7.7 06/29/2018    BILITOT 0.3 06/25/2018    ALKPHOS 81 06/25/2018    AST 24 06/25/2018    ALT 14 06/25/2018       POC Tests: No results for input(s): POCGLU, POCNA, POCK, POCCL, POCBUN, POCHEMO, POCHCT in the last 72 hours.     Coags:   Lab Results   Component Value Date    PROTIME 14.8 06/22/2018    INR 1.17 06/22/2018    APTT 28.1 05/29/2018       HCG (If Applicable): No results found for: PREGTESTUR, PREGSERUM, HCG, HCGQUANT     ABGs:   Lab Results   Component Value Date    PHART 7.420 06/14/2018    PO2ART 65.0 06/14/2018    AOH6HLM 38.0 06/14/2018    WHL9JPQ 24.6 06/14/2018    BEART 0.3 06/14/2018    B7DVVZCZ 92.4 06/14/2018        Type & Screen (If Applicable):  No results found for: [facility]ABO, [facility]    Anesthesia Evaluation  Patient summary reviewed and Nursing notes reviewed no history of anesthetic complications:   Airway: Mallampati: II  TM distance: >3 FB   Neck ROM: full  Mouth opening: > = 3 FB Dental:          Pulmonary:       (-)

## 2018-08-13 NOTE — ANESTHESIA POSTPROCEDURE EVALUATION
Department of Anesthesiology  Postprocedure Note    Patient: Steffanie Javed  MRN: [id number]  YOB: 1945  Date of evaluation: 8/13/2018  Time:  12:23 PM     Procedure Summary     Date:  08/13/18 Room / Location:  [facility] OR  / [facility] OR    Anesthesia Start:  1119 Anesthesia Stop:  9836    Procedure:  TRANSMETATARSAL AMPUTATION (Left ) Diagnosis:  (N17.609)    Surgeon:  Sloan Mahan MD Responsible Provider:  TORITO Beaucahmp CRNA    Anesthesia Type:  general ASA Status:  3          Anesthesia Type: general    Lay Phase I:      Lay Phase II:      Last vitals: Reviewed and per EMR flowsheets.        Anesthesia Post Evaluation    Patient location during evaluation: bedside  Patient participation: complete - patient participated  Level of consciousness: awake and alert  Pain score: 0  Airway patency: patent  Nausea & Vomiting: no nausea  Complications: no  Cardiovascular status: hemodynamically stable  Respiratory status: acceptable  Hydration status: euvolemic

## 2018-08-13 NOTE — BRIEF OP NOTE
Brief Postoperative Note  ______________________________________________________________    Patient: Steffanie Javed  YOB: 1945  MRN: [id number]  Date of Procedure: 8/13/2018    Pre-Op Diagnosis: I70.262    Post-Op Diagnosis: Same       Procedure(s):  TRANSMETATARSAL AMPUTATION    Anesthesia: General    Surgeon(s):  Sloan Mahan MD    Staff:  [employer]: Paco Flores; Ze Severino     Estimated Blood Loss: * No values recorded between 8/13/2018 11:19 AM and 8/13/2018 80:91 PM * mL    Complications: None    Specimens:   ID Type Source Tests Collected by Time Destination   A : transmetatarsal amputation Tissue Foot SURGICAL PATHOLOGY Sloan Mahan MD 8/13/2018 1147        Implants:  * No implants in log *      Drains:      Findings: see op note    Fabio Grullon MD  Date: 8/13/2018  Time: 12:27 PM

## 2018-08-14 NOTE — OP NOTE
[facility]                                 OPERATIVE REPORT    PATIENT NAME: Bunny Prado                 :        1945  MED REC NO:   [id number]                              ROOM:  ACCOUNT NO:   [de-identified]                           ADMIT DATE: 2018  PROVIDER:     Az Bernstein MD    DATE OF PROCEDURE:  2018    PREOPERATIVE DIAGNOSIS:  Dry gangrene of the left forefoot. POSTOPERATIVE DIAGNOSIS:  Dry gangrene of the left forefoot. PROCEDURE:  Left transmetatarsal amputation. SURGEON:  Az Bernstein MD    ANESTHESIA:  General.    BLOOD LOSS:  Minimal.    FLUIDS:  Minimal.    COMPLICATIONS:  None. SPECIMEN:  Forefoot. OPERATIVE PROCEDURE:  The patient identified in the operating room, general  anesthesia obtained, prepped and draped in sterile fashion. The left foot  was marked in the preoperative holding area and confirmed as the site. Time-out was performed. The indication for surgery is this patient underwent an abdominal aortic  aneurysm and iliac aneurysm repair. This was complicated by renal failure  and ischemia of the left foot. Felt to be thrombolic in origin as he has  had normal pulses. He has dry gangrene of all 5 toes, which is demarcated,  and he presents today for amputation. There are no signs of wet gangrene. The biggest concern I have with the surgery was on the plantar flap over  the first metatarsal.  There was gangrenous skin in this location, so  getting the flaps to close was going to be a challenge. So an incision was made at the base of the toes anteriorly and this was all  healthy tissue. I went down through skin and subcutaneous tissues, down to  the metatarsal bones. A posterior flap was created. Again, I had to  excise the skin back over the first metatarsal.  There was some dry  gangrenous subcu tissue as well in this area.   So, it was all debrided back  to healthy clean tissue. Bleeding was good. The metatarsals were  transected with an oscillating saw and taken back further again with the  saw to allow for flap closure. Bleeding was good. No obvious infection  was noted, so the flaps were trimmed and then closure was affected with  interrupted 3-0 and 4-0 nylon sutures. Dry sterile dressing was applied. The patient tolerated the procedure well.           Shweta Marino MD    D: 08/13/2018 15:52:04      T: 08/13/2018 21:48:10     [email or web address]  Job#: [id number]     Doc#: [id number]    CC:  Tj Eaton MD

## 2018-08-15 ENCOUNTER — TELEPHONE (OUTPATIENT)
Dept: VASCULAR SURGERY | Age: 73
End: 2018-08-15

## 2018-08-29 ENCOUNTER — OFFICE VISIT (OUTPATIENT)
Dept: VASCULAR SURGERY | Age: 73
End: 2018-08-29

## 2018-08-29 VITALS — SYSTOLIC BLOOD PRESSURE: 102 MMHG | TEMPERATURE: 98 F | HEART RATE: 68 BPM | DIASTOLIC BLOOD PRESSURE: 61 MMHG

## 2018-08-29 DIAGNOSIS — I74.8 ATHEROEMBOLISM (HCC): Primary | ICD-10-CM

## 2018-08-29 PROCEDURE — 99024 POSTOP FOLLOW-UP VISIT: CPT | Performed by: NURSE PRACTITIONER

## 2018-08-29 NOTE — PROGRESS NOTES
Patient Care Team:  [address] [facility],[address], DO as PCP - General (Pediatrics)  JESSICA Beltre MD as Consulting Physician (Cardiology)    Mr. Bernie Garcia is a 72 y.o. male who presents for post op evaluation. Patient had a left TMA for gangrenous toes  on 8/13/18. This was performed by Dr. Coretta De Leon. Post op symptoms reported increased swelling  Post op pain is moderate. He denies any fever/chills. On evaluation today, incision has slight separation along incision line. There are scattered areas of eschar noted. The the is erythema and swelling noted in the foot. Foot is swollen. Today we left all and sutures. Left DP with palpable pulse. Today we applied a coflex. We have recommended continued ASA 81 mg po qd daily. Recommend leg elevation PRN. Offload sandal when foot touched down on floor. We will follow up with him 5 days for a dressing change or sooner if He develops fever/chills or wound deterioration. This should bring you up to date on [place]  As always we want to thank you for allowing us to participate in the care of your patients.     Sincerely,    TORITO Caballero

## 2018-09-04 ENCOUNTER — TELEPHONE (OUTPATIENT)
Dept: CARDIOLOGY | Age: 73
End: 2018-09-04

## 2018-09-04 NOTE — TELEPHONE ENCOUNTER
Unable to reach Pt voicemail is not set up, [facility] changing clinic days from Monday to Wednesdays, left message of appt change from 10/22 to Wed 10/24 at 230PM and sent letter of change.

## 2018-09-05 ENCOUNTER — OFFICE VISIT (OUTPATIENT)
Dept: VASCULAR SURGERY | Age: 73
End: 2018-09-05

## 2018-09-05 ENCOUNTER — HOSPITAL ENCOUNTER (OUTPATIENT)
Dept: GENERAL RADIOLOGY | Age: 73
Discharge: HOME | End: 2018-09-05
Payer: MEDICARE

## 2018-09-05 ENCOUNTER — PREP FOR PROCEDURE (OUTPATIENT)
Dept: VASCULAR SURGERY | Age: 73
End: 2018-09-05

## 2018-09-05 ENCOUNTER — HOSPITAL ENCOUNTER (OUTPATIENT)
Dept: PREADMISSION TESTING | Age: 73
Discharge: HOME | End: 2018-09-09
Payer: MEDICARE

## 2018-09-05 VITALS — HEIGHT: 73 IN | BODY MASS INDEX: 21.47 KG/M2 | WEIGHT: 162 LBS

## 2018-09-05 VITALS
DIASTOLIC BLOOD PRESSURE: 60 MMHG | RESPIRATION RATE: 18 BRPM | SYSTOLIC BLOOD PRESSURE: 112 MMHG | HEART RATE: 74 BPM | TEMPERATURE: 97.9 F

## 2018-09-05 DIAGNOSIS — Z01.818 PRE-OP TESTING: ICD-10-CM

## 2018-09-05 DIAGNOSIS — I70.262 ATHEROSCLEROSIS OF NATIVE ARTERY OF LEFT LOWER EXTREMITY WITH GANGRENE (HCC): Primary | ICD-10-CM

## 2018-09-05 DIAGNOSIS — I70.262 ATHEROSCLEROSIS OF NATIVE ARTERY OF LEFT LOWER EXTREMITY WITH GANGRENE (HCC): ICD-10-CM

## 2018-09-05 DIAGNOSIS — T81.31XD POSTOPERATIVE WOUND DEHISCENCE, SUBSEQUENT ENCOUNTER: Primary | ICD-10-CM

## 2018-09-05 DIAGNOSIS — T81.30XA WOUND DEHISCENCE: Primary | ICD-10-CM

## 2018-09-05 LAB
EKG P AXIS: 70 DEGREES
EKG P-R INTERVAL: 164 MS
EKG Q-T INTERVAL: 426 MS
EKG QRS DURATION: 114 MS
EKG QTC CALCULATION (BAZETT): 452 MS
EKG T AXIS: 69 DEGREES

## 2018-09-05 PROCEDURE — 99024 POSTOP FOLLOW-UP VISIT: CPT | Performed by: PHYSICIAN ASSISTANT

## 2018-09-05 PROCEDURE — 93005 ELECTROCARDIOGRAM TRACING: CPT

## 2018-09-05 PROCEDURE — 73630 X-RAY EXAM OF FOOT: CPT

## 2018-09-05 RX ORDER — ASPIRIN 81 MG/1
81 TABLET ORAL ONCE
Status: CANCELLED | OUTPATIENT
Start: 2018-09-05 | End: 2018-09-05

## 2018-09-05 RX ORDER — VANCOMYCIN HYDROCHLORIDE 1 G/200ML
1000 INJECTION, SOLUTION INTRAVENOUS
Status: CANCELLED | OUTPATIENT
Start: 2018-09-05 | End: 2018-09-05

## 2018-09-05 RX ORDER — SODIUM CHLORIDE 0.9 % (FLUSH) 0.9 %
10 SYRINGE (ML) INJECTION PRN
Status: CANCELLED | OUTPATIENT
Start: 2018-09-05 | End: 2019-09-05

## 2018-09-05 RX ORDER — SODIUM CHLORIDE 0.9 % (FLUSH) 0.9 %
10 SYRINGE (ML) INJECTION EVERY 12 HOURS SCHEDULED
Status: CANCELLED | OUTPATIENT
Start: 2018-09-05 | End: 2019-09-05

## 2018-09-05 NOTE — PROGRESS NOTES
present.  Erectile dysfunction     GERD (gastroesophageal reflux disease)     Hemodialysis patient ([address]     t-th-sat at [facility] History of blood transfusion     Hyperlipidemia     Hypertension     Osteoarthritis     Palliative care encounter 06/25/2018     Past Surgical History:   Procedure Laterality Date    ABDOMEN SURGERY      APPENDECTOMY      BACK SURGERY      COLONOSCOPY      HC DIALYSIS CATHETER  6/12/2018    CATHETER INSERTION HEMODIALYSIS performed by Tj Eaton MD at [address] [facility] Left     JOINT REPLACEMENT      MASTOID SURGERY Left     NECK SURGERY      [place] AMPUTATION FOOT,TRANSMETATARSAL Left 8/13/2018    TRANSMETATARSAL AMPUTATION performed by Tj Eaton MD at [address] [facility] N/A 6/12/2018    LAPAROTOMY EXPLORATORY performed by Ki King MD at [facility] 11 ANEURYSM/GRFT INS,ABD AORT W/VISCER N/A 6/11/2018    OPEN REPAIR OF ABDOMINAL AORTIC ANEURYSM REPAIR WITH [id number] BYPASS WITH CELL SAVER performed by Tj Eaton MD at [address] [facility] FLX DX W/COLLJ SPEC BR/WA IF PFRMD N/A 6/12/2018    Dr Carpio-Likely ischemic colitis, internal hemorrhoids    TONSILLECTOMY      VASCULAR SURGERY  06/11/2018    DJM. AAA/lliac aneurysm repair, R fem EA/bypass    VASCULAR SURGERY  06/11/2018    DJM. Repair of abdominal aortic aneurysm and bilateral iliac aneurysm with aorta left iliac on the right and right femoral on the left with right common femoral endarterectomy with 18x9 gore-juliocesar bifurcated graft.      Family History   Problem Relation Age of Onset    Other Mother     Cancer Father     Colon Cancer Neg Hx     Colon Polyps Neg Hx     Esophageal Cancer Neg Hx     Liver Cancer Neg Hx     Liver Disease Neg Hx     Rectal Cancer Neg Hx     Stomach Cancer Neg Hx      Social History   Substance Use Topics    Smoking status: Former Smoker     Packs/day: 0.00     Years: 50.00     Types: Cigarettes    Smokeless tobacco:

## 2018-09-05 NOTE — H&P
 Primary osteoarthritis involving multiple joints 03/13/2017    Mixed hyperlipidemia 03/13/2017    BPH (benign prostatic hyperplasia)      Current Outpatient Prescriptions   Medication Sig Dispense Refill    atorvastatin (LIPITOR) 20 MG tablet Take 20 mg by mouth daily      pantoprazole sodium (PROTONIX) 40 MG PACK packet Take 40 mg by mouth every morning (before breakfast)      hydrALAZINE (APRESOLINE) 25 MG tablet Take 25 mg by mouth      cloNIDine (CATAPRES) 0.1 MG tablet Take 0.1 mg by mouth 2 times daily      traZODone (DESYREL) 50 MG tablet Take 50 mg by mouth nightly      Pollen Extracts (PROSTAT PO) Take 30 mg by mouth 2 times daily      dicyclomine (BENTYL) 10 MG capsule Take 10 mg by mouth 2 times daily      fluticasone (FLONASE) 50 MCG/ACT nasal spray 1 spray by Nasal route daily      colestipol (COLESTID) 1 g tablet Take 1 g by mouth daily      ACETAMINOPHEN PO Take 650 mg by mouth 4 times daily as needed       HYDROcodone-acetaminophen (NORCO) 5-325 MG per tablet Take 1 tablet by mouth every 6 hours as needed for Pain. [place]anda [place] aspirin 81 MG EC tablet Take 1 tablet by mouth daily 30 tablet 0    amiodarone (CORDARONE) 200 MG tablet Take 1 tablet by mouth daily 30 tablet 0    ipratropium-albuterol (DUONEB) 0.5-2.5 (3) MG/3ML SOLN nebulizer solution Inhale 3 mLs into the lungs every 4 hours as needed for Shortness of Breath 360 mL 0    amLODIPine (NORVASC) 10 MG tablet Take 1 tablet by mouth daily 30 tablet 0    Multiple Vitamins-Minerals (MULTIVITAMIN ADULTS 50+) TABS Take by mouth daily      tamsulosin (FLOMAX) 0.4 MG capsule TAKE 1 CAPSULE BY MOUTH EVERY DAY 90 capsule 3     No current facility-administered medications for this visit. Allergies: Patient has no known allergies.   Past Medical History:   Diagnosis Date    [facility]neur[facility]m [facility]     Arthritis     BPH (benign prostatic hyperplasia)     CAD (coronary artery disease)     Chronic back pain     Diabetes ([facility]     no meds at present.  Erectile dysfunction     GERD (gastroesophageal reflux disease)     Hemodialysis patient ([address]     t-th-sat at [facility] History of blood transfusion     Hyperlipidemia     Hypertension     Osteoarthritis     Palliative care encounter 06/25/2018     Past Surgical History:   Procedure Laterality Date    ABDOMEN SURGERY      APPENDECTOMY      BACK SURGERY      COLONOSCOPY      HC DIALYSIS CATHETER  6/12/2018    CATHETER INSERTION HEMODIALYSIS performed by Trixie Tinoco MD at [address] [facility] Left     JOINT REPLACEMENT      MASTOID SURGERY Left     NECK SURGERY      [place] AMPUTATION FOOT,TRANSMETATARSAL Left 8/13/2018    TRANSMETATARSAL AMPUTATION performed by Trixie Tinoco MD at [address] [facility] N/A 6/12/2018    LAPAROTOMY EXPLORATORY performed by Jose Viera MD at [facility] 11 ANEURYSM/GRFT INS,ABD AORT W/VISCER N/A 6/11/2018    OPEN REPAIR OF ABDOMINAL AORTIC ANEURYSM REPAIR WITH [id number] BYPASS WITH CELL SAVER performed by Trixie Tinoco MD at [facility] FLX DX W/COLLJ SPEC BR/WA IF PFRMD N/A 6/12/2018    Dr Carpio-Likely ischemic colitis, internal hemorrhoids    TONSILLECTOMY      VASCULAR SURGERY  06/11/2018    DJM. AAA/lliac aneurysm repair, R fem EA/bypass    VASCULAR SURGERY  06/11/2018    DJM. Repair of abdominal aortic aneurysm and bilateral iliac aneurysm with aorta left iliac on the right and right femoral on the left with right common femoral endarterectomy with 18x9 gore-juliocesar bifurcated graft.      Family History   Problem Relation Age of Onset    Other Mother     Cancer Father     Colon Cancer Neg Hx     Colon Polyps Neg Hx     Esophageal Cancer Neg Hx     Liver Cancer Neg Hx     Liver Disease Neg Hx     Rectal Cancer Neg Hx     Stomach Cancer Neg Hx      Social History   Substance Use Topics    Smoking status: Former Smoker     Packs/day: 0.00     Years: 50.00     Types: Cigarettes    Smokeless tobacco: Never Used      Comment: pt has not smoked since surgery    Alcohol use Yes      Comment: 1 beer per year       Review of Systems    Constitutional - no significant activity change, appetite change, or unexpected weight change. No fever or chills. No diaphoresis or significant fatigue. HENT - no significant rhinorrhea or epistaxis. No tinnitus or significant hearing loss. Eyes - no sudden vision change or amaurosis. Respiratory - no significant shortness of breath, wheezing, or stridor. No apnea, cough, or chest tightness associated with shortness of breath. Cardiovascular - no chest pain, syncope, or significant dizziness. No palpitations or significant leg swelling. Patient reports left foot pain. Gastrointestinal - no abdominal swelling or pain. No blood in stool. No severe constipation, diarrhea, nausea, or vomiting. Genitourinary - No difficulty urinating, dysuria, frequency, or urgency. No flank pain or hematuria. Musculoskeletal - no back pain, gait disturbance, or myalgia. Skin - no color change, rash, pallor. Left TMA site swollen, red and . Neurologic - no dizziness, facial asymmetry, or light headedness. No seizures. No speech difficulty or lateralizing weakness. Hematologic - no easy bruising or excessive bleeding. Psychiatric - no severe anxiety or nervousness. No confusion. All other review of systems are negative. Physical Exam    /60 Comment: left  Pulse 74   Temp 97.9 °F (36.6 °C)   Resp 18     Constitutional - well developed, well nourished. No diaphoresis or acute distress. HENT - head normocephalic. Right external ear canal appears normal.  Left external ear canal appears normal.  Septum appears midline. Eyes - conjunctiva normal.  EOMS normal.  No exudate. No icterus. Neck- ROM appears normal, no tracheal deviation. Cardiovascular - Regular rate and rhythm. Heart sounds are normal.  No murmur, rub, or gallop.   Carotid pulses are 2+ to with debridement of the left TMA vs BKA/AKA. Risks have been discussed with the patient including but not limited to MI, death, CVA, bleed, nerve injury, and infection. Proceed with debridement of the left TMA vs BKA/AKA.       Sincerely,    Lata Gamez PA-C

## 2018-09-07 ENCOUNTER — ANESTHESIA EVENT (OUTPATIENT)
Dept: OPERATING ROOM | Age: 73
End: 2018-09-07
Payer: MEDICARE

## 2018-09-07 ENCOUNTER — ANESTHESIA (OUTPATIENT)
Dept: OPERATING ROOM | Age: 73
End: 2018-09-07
Payer: MEDICARE

## 2018-09-07 ENCOUNTER — HOSPITAL ENCOUNTER (OUTPATIENT)
Age: 73
Setting detail: OUTPATIENT SURGERY
Discharge: HOME | End: 2018-09-07
Attending: SURGERY | Admitting: SURGERY
Payer: MEDICARE

## 2018-09-07 VITALS
DIASTOLIC BLOOD PRESSURE: 48 MMHG | SYSTOLIC BLOOD PRESSURE: 88 MMHG | RESPIRATION RATE: 12 BRPM | OXYGEN SATURATION: 100 %

## 2018-09-07 VITALS
HEIGHT: 73 IN | DIASTOLIC BLOOD PRESSURE: 66 MMHG | RESPIRATION RATE: 16 BRPM | OXYGEN SATURATION: 99 % | TEMPERATURE: 98.1 F | BODY MASS INDEX: 21.47 KG/M2 | WEIGHT: 162 LBS | HEART RATE: 72 BPM | SYSTOLIC BLOOD PRESSURE: 133 MMHG

## 2018-09-07 DIAGNOSIS — I70.262 ATHEROSCLEROSIS OF NATIVE ARTERY OF LEFT LOWER EXTREMITY WITH GANGRENE (HCC): ICD-10-CM

## 2018-09-07 PROBLEM — L08.9 TYPE 2 DIABETES MELLITUS WITH LEFT DIABETIC FOOT INFECTION (HCC): Chronic | Status: ACTIVE | Noted: 2018-09-07

## 2018-09-07 PROBLEM — E11.628 TYPE 2 DIABETES MELLITUS WITH LEFT DIABETIC FOOT INFECTION (HCC): Chronic | Status: ACTIVE | Noted: 2018-09-07

## 2018-09-07 LAB
ANION GAP SERPL CALCULATED.3IONS-SCNC: 12 MMOL/L (ref 7–19)
APTT: 34.4 SEC (ref 26–36.2)
BASOPHILS ABSOLUTE: 0.1 K/UL (ref 0–0.2)
BASOPHILS RELATIVE PERCENT: 0.5 % (ref 0–1)
BUN BLDV-MCNC: 23 MG/DL (ref 8–23)
CALCIUM SERPL-MCNC: 9.9 MG/DL (ref 8.8–10.2)
CHLORIDE BLD-SCNC: 89 MMOL/L (ref 98–111)
CO2: 32 MMOL/L (ref 22–29)
CREAT SERPL-MCNC: 2.9 MG/DL (ref 0.5–1.2)
EOSINOPHILS ABSOLUTE: 0.5 K/UL (ref 0–0.6)
EOSINOPHILS RELATIVE PERCENT: 3.9 % (ref 0–5)
GFR NON-AFRICAN AMERICAN: 22
GLUCOSE BLD-MCNC: 119 MG/DL (ref 74–109)
GRAM STAIN RESULT: ABNORMAL
HCT VFR BLD CALC: 38.8 % (ref 42–52)
HEMOGLOBIN: 12 G/DL (ref 14–18)
INR BLD: 1 (ref 0.88–1.18)
LYMPHOCYTES ABSOLUTE: 2.1 K/UL (ref 1.1–4.5)
LYMPHOCYTES RELATIVE PERCENT: 17.9 % (ref 20–40)
MCH RBC QN AUTO: 29.9 PG (ref 27–31)
MCHC RBC AUTO-ENTMCNC: 30.9 G/DL (ref 33–37)
MCV RBC AUTO: 96.5 FL (ref 80–94)
MONOCYTES ABSOLUTE: 1 K/UL (ref 0–0.9)
MONOCYTES RELATIVE PERCENT: 8.3 % (ref 0–10)
NEUTROPHILS ABSOLUTE: 8 K/UL (ref 1.5–7.5)
NEUTROPHILS RELATIVE PERCENT: 68 % (ref 50–65)
ORGANISM: ABNORMAL
ORGANISM: ABNORMAL
PDW BLD-RTO: 16.5 % (ref 11.5–14.5)
PLATELET # BLD: 330 K/UL (ref 130–400)
PMV BLD AUTO: 10.2 FL (ref 9.4–12.4)
POTASSIUM REFLEX MAGNESIUM: 4.1 MMOL/L (ref 3.5–4.9)
PROTHROMBIN TIME: 13.1 SEC (ref 12–14.6)
RBC # BLD: 4.02 M/UL (ref 4.7–6.1)
SODIUM BLD-SCNC: 133 MMOL/L (ref 136–145)
WBC # BLD: 11.8 K/UL (ref 4.8–10.8)
WOUND/ABSCESS: ABNORMAL

## 2018-09-07 PROCEDURE — 7100000000 HC PACU RECOVERY - FIRST 15 MIN: Performed by: SURGERY

## 2018-09-07 PROCEDURE — 87205 SMEAR GRAM STAIN: CPT

## 2018-09-07 PROCEDURE — 87186 SC STD MICRODIL/AGAR DIL: CPT

## 2018-09-07 PROCEDURE — 2500000003 HC RX 250 WO HCPCS

## 2018-09-07 PROCEDURE — 85730 THROMBOPLASTIN TIME PARTIAL: CPT

## 2018-09-07 PROCEDURE — 3700000001 HC ADD 15 MINUTES (ANESTHESIA): Performed by: SURGERY

## 2018-09-07 PROCEDURE — 11042 DBRDMT SUBQ TIS 1ST 20SQCM/<: CPT | Performed by: SURGERY

## 2018-09-07 PROCEDURE — 6360000002 HC RX W HCPCS

## 2018-09-07 PROCEDURE — 85610 PROTHROMBIN TIME: CPT

## 2018-09-07 PROCEDURE — 6370000000 HC RX 637 (ALT 250 FOR IP): Performed by: PHYSICIAN ASSISTANT

## 2018-09-07 PROCEDURE — 3600000003 HC SURGERY LEVEL 3 BASE: Performed by: SURGERY

## 2018-09-07 PROCEDURE — 80048 BASIC METABOLIC PNL TOTAL CA: CPT

## 2018-09-07 PROCEDURE — 85025 COMPLETE CBC W/AUTO DIFF WBC: CPT

## 2018-09-07 PROCEDURE — 3700000000 HC ANESTHESIA ATTENDED CARE: Performed by: SURGERY

## 2018-09-07 PROCEDURE — 7100000001 HC PACU RECOVERY - ADDTL 15 MIN: Performed by: SURGERY

## 2018-09-07 PROCEDURE — 3600000013 HC SURGERY LEVEL 3 ADDTL 15MIN: Performed by: SURGERY

## 2018-09-07 PROCEDURE — 2580000003 HC RX 258: Performed by: SURGERY

## 2018-09-07 PROCEDURE — 86403 PARTICLE AGGLUT ANTBDY SCRN: CPT

## 2018-09-07 PROCEDURE — 7100000011 HC PHASE II RECOVERY - ADDTL 15 MIN: Performed by: SURGERY

## 2018-09-07 PROCEDURE — 36591 DRAW BLOOD OFF VENOUS DEVICE: CPT

## 2018-09-07 PROCEDURE — 36415 COLL VENOUS BLD VENIPUNCTURE: CPT

## 2018-09-07 PROCEDURE — 7100000010 HC PHASE II RECOVERY - FIRST 15 MIN: Performed by: SURGERY

## 2018-09-07 PROCEDURE — 6360000002 HC RX W HCPCS: Performed by: PHYSICIAN ASSISTANT

## 2018-09-07 PROCEDURE — 2709999900 HC NON-CHARGEABLE SUPPLY: Performed by: SURGERY

## 2018-09-07 PROCEDURE — 87070 CULTURE OTHR SPECIMN AEROBIC: CPT

## 2018-09-07 PROCEDURE — 93923 UPR/LXTR ART STDY 3+ LVLS: CPT

## 2018-09-07 RX ORDER — PROPOFOL 10 MG/ML
INJECTION, EMULSION INTRAVENOUS PRN
Status: DISCONTINUED | OUTPATIENT
Start: 2018-09-07 | End: 2018-09-07 | Stop reason: SDUPTHER

## 2018-09-07 RX ORDER — MORPHINE SULFATE 4 MG/ML
4 INJECTION, SOLUTION INTRAMUSCULAR; INTRAVENOUS
Status: DISCONTINUED | OUTPATIENT
Start: 2018-09-07 | End: 2018-09-07 | Stop reason: HOSPADM

## 2018-09-07 RX ORDER — SODIUM CHLORIDE 0.9 % (FLUSH) 0.9 %
10 SYRINGE (ML) INJECTION EVERY 12 HOURS SCHEDULED
Status: DISCONTINUED | OUTPATIENT
Start: 2018-09-07 | End: 2018-09-07 | Stop reason: HOSPADM

## 2018-09-07 RX ORDER — SODIUM CHLORIDE, SODIUM LACTATE, POTASSIUM CHLORIDE, CALCIUM CHLORIDE 600; 310; 30; 20 MG/100ML; MG/100ML; MG/100ML; MG/100ML
INJECTION, SOLUTION INTRAVENOUS CONTINUOUS
Status: DISCONTINUED | OUTPATIENT
Start: 2018-09-07 | End: 2018-09-07 | Stop reason: HOSPADM

## 2018-09-07 RX ORDER — VANCOMYCIN HYDROCHLORIDE 1 G/200ML
1000 INJECTION, SOLUTION INTRAVENOUS
Status: COMPLETED | OUTPATIENT
Start: 2018-09-07 | End: 2018-09-07

## 2018-09-07 RX ORDER — PROMETHAZINE HYDROCHLORIDE 25 MG/ML
6.25 INJECTION, SOLUTION INTRAMUSCULAR; INTRAVENOUS
Status: DISCONTINUED | OUTPATIENT
Start: 2018-09-07 | End: 2018-09-07 | Stop reason: HOSPADM

## 2018-09-07 RX ORDER — SODIUM CHLORIDE 0.9 % (FLUSH) 0.9 %
10 SYRINGE (ML) INJECTION PRN
Status: DISCONTINUED | OUTPATIENT
Start: 2018-09-07 | End: 2018-09-07 | Stop reason: HOSPADM

## 2018-09-07 RX ORDER — DIPHENHYDRAMINE HYDROCHLORIDE 50 MG/ML
12.5 INJECTION INTRAMUSCULAR; INTRAVENOUS
Status: DISCONTINUED | OUTPATIENT
Start: 2018-09-07 | End: 2018-09-07 | Stop reason: HOSPADM

## 2018-09-07 RX ORDER — MEPERIDINE HYDROCHLORIDE 50 MG/ML
12.5 INJECTION INTRAMUSCULAR; INTRAVENOUS; SUBCUTANEOUS EVERY 5 MIN PRN
Status: DISCONTINUED | OUTPATIENT
Start: 2018-09-07 | End: 2018-09-07 | Stop reason: HOSPADM

## 2018-09-07 RX ORDER — SODIUM HYPOCHLORITE 1.25 MG/ML
SOLUTION TOPICAL DAILY
Status: DISCONTINUED | OUTPATIENT
Start: 2018-09-07 | End: 2018-09-07 | Stop reason: HOSPADM

## 2018-09-07 RX ORDER — FENTANYL CITRATE 50 UG/ML
50 INJECTION, SOLUTION INTRAMUSCULAR; INTRAVENOUS
Status: DISCONTINUED | OUTPATIENT
Start: 2018-09-07 | End: 2018-09-07 | Stop reason: HOSPADM

## 2018-09-07 RX ORDER — HYDROMORPHONE HCL IN 0.9% NACL 0.5 MG/ML
0.25 SYRINGE (ML) INTRAVENOUS EVERY 5 MIN PRN
Status: DISCONTINUED | OUTPATIENT
Start: 2018-09-07 | End: 2018-09-07 | Stop reason: HOSPADM

## 2018-09-07 RX ORDER — MIDAZOLAM HYDROCHLORIDE 1 MG/ML
2 INJECTION INTRAMUSCULAR; INTRAVENOUS
Status: DISCONTINUED | OUTPATIENT
Start: 2018-09-07 | End: 2018-09-07 | Stop reason: HOSPADM

## 2018-09-07 RX ORDER — CLINDAMYCIN HYDROCHLORIDE 300 MG/1
300 CAPSULE ORAL 3 TIMES DAILY
COMMUNITY
End: 2018-10-01 | Stop reason: ALTCHOICE

## 2018-09-07 RX ORDER — SCOLOPAMINE TRANSDERMAL SYSTEM 1 MG/1
1 PATCH, EXTENDED RELEASE TRANSDERMAL ONCE
Status: DISCONTINUED | OUTPATIENT
Start: 2018-09-07 | End: 2018-09-07 | Stop reason: HOSPADM

## 2018-09-07 RX ORDER — MORPHINE SULFATE/0.9% NACL/PF 1 MG/ML
4 SYRINGE (ML) INJECTION EVERY 5 MIN PRN
Status: DISCONTINUED | OUTPATIENT
Start: 2018-09-07 | End: 2018-09-07 | Stop reason: HOSPADM

## 2018-09-07 RX ORDER — HYDRALAZINE HYDROCHLORIDE 20 MG/ML
5 INJECTION INTRAMUSCULAR; INTRAVENOUS EVERY 10 MIN PRN
Status: DISCONTINUED | OUTPATIENT
Start: 2018-09-07 | End: 2018-09-07 | Stop reason: HOSPADM

## 2018-09-07 RX ORDER — MORPHINE SULFATE/0.9% NACL/PF 1 MG/ML
2 SYRINGE (ML) INJECTION EVERY 5 MIN PRN
Status: DISCONTINUED | OUTPATIENT
Start: 2018-09-07 | End: 2018-09-07 | Stop reason: HOSPADM

## 2018-09-07 RX ORDER — LIDOCAINE HYDROCHLORIDE 10 MG/ML
1 INJECTION, SOLUTION EPIDURAL; INFILTRATION; INTRACAUDAL; PERINEURAL
Status: DISCONTINUED | OUTPATIENT
Start: 2018-09-07 | End: 2018-09-07 | Stop reason: HOSPADM

## 2018-09-07 RX ORDER — ASPIRIN 81 MG/1
81 TABLET ORAL ONCE
Status: COMPLETED | OUTPATIENT
Start: 2018-09-07 | End: 2018-09-07

## 2018-09-07 RX ORDER — SODIUM HYPOCHLORITE 1.25 MG/ML
SOLUTION TOPICAL 2 TIMES DAILY
Qty: 1 BOTTLE | Refills: 5
Start: 2018-09-07 | End: 2018-10-10 | Stop reason: ALTCHOICE

## 2018-09-07 RX ORDER — SODIUM CHLORIDE 450 MG/100ML
INJECTION, SOLUTION INTRAVENOUS CONTINUOUS
Status: DISCONTINUED | OUTPATIENT
Start: 2018-09-07 | End: 2018-09-07 | Stop reason: HOSPADM

## 2018-09-07 RX ORDER — LIDOCAINE HYDROCHLORIDE 10 MG/ML
INJECTION, SOLUTION EPIDURAL; INFILTRATION; INTRACAUDAL; PERINEURAL PRN
Status: DISCONTINUED | OUTPATIENT
Start: 2018-09-07 | End: 2018-09-07 | Stop reason: SDUPTHER

## 2018-09-07 RX ORDER — OXYCODONE AND ACETAMINOPHEN 10; 325 MG/1; MG/1
1 TABLET ORAL EVERY 4 HOURS PRN
COMMUNITY
End: 2019-06-04 | Stop reason: ALTCHOICE

## 2018-09-07 RX ORDER — EPHEDRINE SULFATE 50 MG/ML
INJECTION, SOLUTION INTRAVENOUS PRN
Status: DISCONTINUED | OUTPATIENT
Start: 2018-09-07 | End: 2018-09-07 | Stop reason: SDUPTHER

## 2018-09-07 RX ORDER — HYDROMORPHONE HCL IN 0.9% NACL 0.5 MG/ML
0.5 SYRINGE (ML) INTRAVENOUS EVERY 5 MIN PRN
Status: DISCONTINUED | OUTPATIENT
Start: 2018-09-07 | End: 2018-09-07 | Stop reason: HOSPADM

## 2018-09-07 RX ORDER — METOCLOPRAMIDE HYDROCHLORIDE 5 MG/ML
10 INJECTION INTRAMUSCULAR; INTRAVENOUS
Status: DISCONTINUED | OUTPATIENT
Start: 2018-09-07 | End: 2018-09-07 | Stop reason: HOSPADM

## 2018-09-07 RX ORDER — LABETALOL HYDROCHLORIDE 5 MG/ML
5 INJECTION, SOLUTION INTRAVENOUS EVERY 10 MIN PRN
Status: DISCONTINUED | OUTPATIENT
Start: 2018-09-07 | End: 2018-09-07 | Stop reason: HOSPADM

## 2018-09-07 RX ADMIN — EPHEDRINE SULFATE 10 MG: 50 INJECTION, SOLUTION INTRAMUSCULAR; INTRAVENOUS; SUBCUTANEOUS at 10:55

## 2018-09-07 RX ADMIN — Medication 0.25 MG: at 11:34

## 2018-09-07 RX ADMIN — EPHEDRINE SULFATE 10 MG: 50 INJECTION, SOLUTION INTRAMUSCULAR; INTRAVENOUS; SUBCUTANEOUS at 10:50

## 2018-09-07 RX ADMIN — SODIUM CHLORIDE: 4.5 INJECTION, SOLUTION INTRAVENOUS at 09:27

## 2018-09-07 RX ADMIN — LIDOCAINE HYDROCHLORIDE 50 MG: 10 INJECTION, SOLUTION EPIDURAL; INFILTRATION; INTRACAUDAL; PERINEURAL at 10:28

## 2018-09-07 RX ADMIN — EPHEDRINE SULFATE 10 MG: 50 INJECTION, SOLUTION INTRAMUSCULAR; INTRAVENOUS; SUBCUTANEOUS at 10:42

## 2018-09-07 RX ADMIN — VANCOMYCIN HYDROCHLORIDE 1000 MG: 1 INJECTION, SOLUTION INTRAVENOUS at 09:27

## 2018-09-07 RX ADMIN — ASPIRIN 81 MG: 81 TABLET, COATED ORAL at 09:26

## 2018-09-07 RX ADMIN — PROPOFOL 160 MG: 10 INJECTION, EMULSION INTRAVENOUS at 10:28

## 2018-09-07 RX ADMIN — EPHEDRINE SULFATE 20 MG: 50 INJECTION, SOLUTION INTRAMUSCULAR; INTRAVENOUS; SUBCUTANEOUS at 10:45

## 2018-09-07 ASSESSMENT — LIFESTYLE VARIABLES: SMOKING_STATUS: 0

## 2018-09-07 ASSESSMENT — PAIN SCALES - GENERAL
PAINLEVEL_OUTOF10: 2
PAINLEVEL_OUTOF10: 5
PAINLEVEL_OUTOF10: 4
PAINLEVEL_OUTOF10: 0

## 2018-09-07 NOTE — H&P (VIEW-ONLY)
Patient Care Team:  Nat Bernal DO as PCP - General (Pediatrics)  JESSICA Choudhury MD as Consulting Physician (Cardiology)      History and Physical:  Mr. Kesha Tong is a 72 y.o. male who presents today for a wound check and unna boot change. He underwent a [id number] bypass on 6/11/18 by Dr. Leobardo York. He also had a left TMA for gangrenous toes on 8/13/18. This was performed by Dr. Leobardo York. He thinks the wound looks worse and is more swollen. He reports pain in the left foot and is taking pain medication. He denies any fever/chills.     Anshul Leyva is a 72 y.o. 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 ([address] 08/08/2018    History of diarrhea 08/03/2018    Acute renal failure on dialysis ([address] 08/03/2018    Anemia 08/03/2018    Atheroembolism ([address] 07/11/2018    Encephalopathy     Bradycardia     Colitis     HCAP (healthcare-associated pneumonia)     Heme + stool     SVT (supraventricular tachycardia) ([address] 06/22/2018    NORY (acute kidney injury) ([address] 06/22/2018    ATN (acute tubular necrosis) ([address] 06/22/2018    Leukocytosis 06/22/2018    Atrial flutter with rapid ventricular response (HCC)     Weakness 06/21/2018    Gait abnormality 06/21/2018    Pain in left leg 06/21/2018    S/P AAA repair 06/20/2018    Hypocalcemia 06/15/2018    Hyperkalemia 06/12/2018    Acute renal failure (ARF) ([address] 06/12/2018    Ischemic colitis ([address]     High serum lactate     LFTs abnormal     Anemia     Internal hemorrhoid     AAA (abdominal aortic aneurysm) ([address] 06/11/2018    Type 2 diabetes mellitus with complication, without long-term current use of insulin ([address] 06/11/2018    Acute blood loss anemia 06/11/2018    Coagulopathy ([facility] 06/11/2018    Essential hypertension 03/13/2017    Tobacco use 03/13/2017 present.  Erectile dysfunction     GERD (gastroesophageal reflux disease)     Hemodialysis patient ([address]     t-th[facility]sat at [facility] History of blood transfusion     Hyperlipidemia     Hypertension     Osteoarthritis     Palliative care encounter 06/25/2018     Past Surgical History:   Procedure Laterality Date    ABDOMEN SURGERY      APPENDECTOMY      BACK SURGERY      COLONOSCOPY      HC DIALYSIS CATHETER  6/12/2018    CATHETER INSERTION HEMODIALYSIS performed by Selin Batista MD at [address] [facility] Left     JOINT REPLACEMENT      MASTOID SURGERY Left     NECK SURGERY      [place] AMPUTATION FOOT,TRANSMETATARSAL Left 8/13/2018    TRANSMETATARSAL AMPUTATION performed by Selin Batista MD at [address] [facility] N/A 6/12/2018    LAPAROTOMY EXPLORATORY performed by Rosamaria Boyer MD at [facility] 11 ANEURYSM/GRFT INS,ABD AORT W/VISCER N/A 6/11/2018    OPEN REPAIR OF ABDOMINAL AORTIC ANEURYSM REPAIR WITH [id number] BYPASS WITH CELL SAVER performed by Selin Batista MD at [address] [facility] FLX DX W/COLLJ SPEC BR/WA IF PFRMD N/A 6/12/2018    Dr Carpio-Likely ischemic colitis, internal hemorrhoids    TONSILLECTOMY      VASCULAR SURGERY  06/11/2018    DJM. AAA/lliac aneurysm repair, R fem EA/bypass    VASCULAR SURGERY  06/11/2018    DJM. Repair of abdominal aortic aneurysm and bilateral iliac aneurysm with aorta left iliac on the right and right femoral on the left with right common femoral endarterectomy with 18x9 gore-juliocesar bifurcated graft.      Family History   Problem Relation Age of Onset    Other Mother     Cancer Father     Colon Cancer Neg Hx     Colon Polyps Neg Hx     Esophageal Cancer Neg Hx     Liver Cancer Neg Hx     Liver Disease Neg Hx     Rectal Cancer Neg Hx     Stomach Cancer Neg Hx      Social History   Substance Use Topics    Smoking status: Former Smoker     Packs/day: 0.00     Years: 50.00     Types: Cigarettes    Smokeless tobacco: palpation bilaterally without bruit. Extremities - Radial and brachial pulses are 2+ to palpation bilaterally. Left DP and peroneal with 2+ DS, left PT with 1+ DS noted. The left TMA site has dehisced and is macerated with sough and areas of eschar noted. There is swelling and erythema noted. No cyanosis, clubbing, or significant edema. No signs atheroembolic event. Pulmonary  effort appears normal.  No respiratory distress. Lungs - Breath sounds normal. No wheezes or rales. GI - Abdomen  soft, non tender, bowel sounds X 4 quadrants. No guarding or rebound tenderness. No distension or palpable mass. Genitourinary  deferred. Musculoskeletal  ROM appears normal.  No significant edema. Neurologic  alert and oriented X 3. Physiologic. Skin  warm, dry, and intact. No rash, erythema, or pallor. Psychiatric  mood, affect, and behavior appear normal.  Judgment and thought processes appear normal.    Risk factors for atherosclerosis of all vascular beds have been reviewed with the patient including:  Family history, tobacco abuse in all forms, elevated cholesterol, hyperlipidemia, and diabetes. Assessment    1. Postoperative wound dehiscence, subsequent encounter    2. PVD      Plan    Start/Continue ASA EC 81 mg daily  Strongly encourage statin therapy  Leg elevation above the level of the heart   Continue in off load sandal at all times when up  Recommend no smoking  We cx the wound today and started him on clindamycin 300 mg TID x 10 days  We will obtain a left foot X-ray today and a JERRY. (the JERRY cannot be done until 3pm today and the patient refused to wait until then. We will obtain the JERRY the day of surgery)  Discussed the case with Dr. Radha Nails. He recommends proceeding with a debridement of the left TMA vs BKA/AKA. Options have been discussed with the patient including continued medical management vs. proceeding withdebridement of the left TMA.  .  Patient has opted to proceed with debridement of the left TMA vs BKA/AKA. Risks have been discussed with the patient including but not limited to MI, death, CVA, bleed, nerve injury, and infection. Proceed with debridement of the left TMA vs BKA/AKA.       Sincerely,    Renuka Le PA-C

## 2018-09-07 NOTE — ANESTHESIA PRE PROCEDURE
Department of Anesthesiology  Preprocedure Note       Name:  Alcides York   Age:  72 y.o.  :  1945                                          MRN:  [id number]         Date:  2018      Surgeon: Abhishek Garcia):  Madison Gonzalez MD    Procedure: Procedure(s):  TRANSMETATARSAL AMPUTATION debridement    Medications prior to admission:   Prior to Admission medications    Medication Sig Start Date End Date Taking? Authorizing Provider   atorvastatin (LIPITOR) 20 MG tablet Take 20 mg by mouth daily    Historical Provider, MD   pantoprazole sodium (PROTONIX) 40 MG PACK packet Take 40 mg by mouth every morning (before breakfast)    Historical Provider, MD   hydrALAZINE (APRESOLINE) 25 MG tablet Take 25 mg by mouth    Historical Provider, MD   cloNIDine (CATAPRES) 0.1 MG tablet Take 0.1 mg by mouth 2 times daily    Historical Provider, MD   traZODone (DESYREL) 50 MG tablet Take 50 mg by mouth nightly    Historical Provider, MD   Pollen Extracts (PROSTAT PO) Take 30 mg by mouth 2 times daily    Historical Provider, MD   dicyclomine (BENTYL) 10 MG capsule Take 10 mg by mouth 2 times daily    Historical Provider, MD   fluticasone (FLONASE) 50 MCG/ACT nasal spray 1 spray by Nasal route daily    Historical Provider, MD   colestipol (COLESTID) 1 g tablet Take 1 g by mouth daily    Historical Provider, MD   ACETAMINOPHEN PO Take 650 mg by mouth 4 times daily as needed     Historical Provider, MD   HYDROcodone-acetaminophen (NORCO) 5-325 MG per tablet Take 1 tablet by mouth every 6 hours as needed for Pain. Theodora Jang     Historical Provider, MD   aspirin 81 MG EC tablet Take 1 tablet by mouth daily 18   Felicia Sagastume MD   amiodarone (CORDARONE) 200 MG tablet Take 1 tablet by mouth daily 18   Felicia Sagastume MD   ipratropium-albuterol (DUONEB) 0.5-2.5 (3) MG/3ML SOLN nebulizer solution Inhale 3 mLs into the lungs every 4 hours as needed for Shortness of Breath 18   Felicia Sagastume MD amLODIPine (NORVASC) 10 MG tablet Take 1 tablet by mouth daily 6/30/18   Ya Handley MD   Multiple Vitamins-Minerals (MULTIVITAMIN ADULTS 50+) TABS Take by mouth daily    Historical Provider, MD   tamsulosin (FLOMAX) 0.4 MG capsule TAKE 1 CAPSULE BY MOUTH EVERY DAY 3/16/18   MATTHEW Murry DO       Current medications:    No current facility-administered medications for this visit. No current outpatient prescriptions on file.      Facility-Administered Medications Ordered in Other Visits   Medication Dose Route Frequency Provider Last Rate Last Dose    sodium chloride flush 0.9 % injection 10 mL  10 mL Intravenous 2 times per day Bandar Gandara PA-C        sodium chloride flush 0.9 % injection 10 mL  10 mL Intravenous PRN Constanza Mendes PA-C        vancomycin (VANCOCIN) 1000 mg in dextrose 5% 200 mL IVPB  1,000 mg Intravenous On Call to Payton Blair PA-C        aspirin EC tablet 81 mg  81 mg Oral Once Bandar Gandara PA-C           Allergies:  No Known Allergies    Problem List:    Patient Active Problem List   Diagnosis Code    BPH (benign prostatic hyperplasia) N40.0    Essential hypertension I10    Tobacco use Z72.0    Primary osteoarthritis involving multiple joints M15.0    Mixed hyperlipidemia E78.2    AAA (abdominal aortic aneurysm) ([facility] I71.4    Type 2 diabetes mellitus with complication, without long-term current use of insulin ([facility]) E11.8    Acute blood loss anemia D62    Coagulopathy ([facility]) D68.9    Hyperkalemia E87.5    Acute renal failure (ARF) ([facility]) N17.9    Ischemic colitis ([facility]) K55.9    High serum lactate R79.89    LFTs abnormal R94.5    Anemia D64.9    Internal hemorrhoid K64.8    Hypocalcemia E83.51    S/P AAA repair Z98.890, Z86.79    Weakness R53.1    Gait abnormality R26.9    Pain in left leg M79.605    SVT (supraventricular tachycardia) ([facility]) I47.1    NORY (acute kidney injury) ([facility] N17.9    ATN (acute tubular necrosis) ([facility]) DJM. AAA/lliac aneurysm repair, R fem EA/bypass    VASCULAR SURGERY  06/11/2018    DJM. Repair of abdominal aortic aneurysm and bilateral iliac aneurysm with aorta left iliac on the right and right femoral on the left with right common femoral endarterectomy with 18x9 gore-juliocesar bifurcated graft. Social History:    Social History   Substance Use Topics    Smoking status: Former Smoker     Packs/day: 0.00     Years: 50.00     Types: Cigarettes    Smokeless tobacco: Never Used      Comment: pt has not smoked since surgery    Alcohol use Yes      Comment: 1 beer per year                                Counseling given: Not Answered      Vital Signs (Current): There were no vitals filed for this visit. BP Readings from Last 3 Encounters:   09/05/18 112/60   08/29/18 102/61   08/13/18 130/76       NPO Status:                                                                                 BMI:   Wt Readings from Last 3 Encounters:   09/05/18 162 lb (73.5 kg)   08/13/18 162 lb (73.5 kg)   08/08/18 162 lb (73.5 kg)     There is no height or weight on file to calculate BMI.    CBC:   Lab Results   Component Value Date    WBC 9.5 08/13/2018    RBC 3.49 08/13/2018    HGB 10.4 08/13/2018    HCT 33.1 08/13/2018    MCV 94.8 08/13/2018    RDW 16.2 08/13/2018     08/13/2018       CMP:   Lab Results   Component Value Date     08/13/2018    K 3.4 08/13/2018    K 4.2 06/21/2018    CL 96 08/13/2018    CO2 32 08/13/2018    BUN 26 08/13/2018    CREATININE 3.9 08/13/2018    LABGLOM 15 08/13/2018    GLUCOSE 127 08/13/2018    PROT 5.1 06/25/2018    CALCIUM 9.1 08/13/2018    BILITOT 0.3 06/25/2018    ALKPHOS 81 06/25/2018    AST 24 06/25/2018    ALT 14 06/25/2018       POC Tests: No results for input(s): POCGLU, POCNA, POCK, POCCL, POCBUN, POCHEMO, POCHCT in the last 72 hours.     Coags:   Lab Results   Component Value Date    PROTIME 13.4 08/13/2018    INR 1.03 08/13/2018    APTT 33.3 08/13/2018       HCG (If Applicable): No results found for: PREGTESTUR, PREGSERUM, HCG, HCGQUANT     ABGs:   Lab Results   Component Value Date    PHART 7.420 06/14/2018    PO2ART 65.0 06/14/2018    DIJ5MSV 38.0 06/14/2018    AJL2DKQ 24.6 06/14/2018    BEART 0.3 06/14/2018    G5NTIHAI 92.4 06/14/2018        Type & Screen (If Applicable):  No results found for: LABABO, [address]    Anesthesia Evaluation  Patient summary reviewed and Nursing notes reviewed no history of anesthetic complications:   Airway: Mallampati: II  TM distance: >3 FB   Neck ROM: full  Mouth opening: > = 3 FB Dental:          Pulmonary:       (-) pneumonia, COPD, sleep apnea and not a current smoker          Patient did not smoke on day of surgery. Cardiovascular:    (+) hypertension:, dysrhythmias: atrial flutter, hyperlipidemia    (-) past MI,  angina,  CHF and orthopnea    ECG reviewed               Beta Blocker:  Not on Beta Blocker         Neuro/Psych:   Negative Neuro/Psych ROS              GI/Hepatic/Renal:   (+) GERD: well controlled, renal disease (Last HD Saturday): dialysis,           Endo/Other:    (+) Diabetes, blood dyscrasia::., .                 Abdominal:           Vascular:   + PVD, aortic or cerebral, . Anesthesia Plan      general     ASA 3     (Decadron/Zofran Intraop)  Induction: intravenous. BIS  MIPS: Postoperative opioids intended and Prophylactic antiemetics administered. Anesthetic plan and risks discussed with patient.                       Ozzy José MD   9/7/2018

## 2018-09-07 NOTE — INTERVAL H&P NOTE
H&P updated. The patient was examined and foot looks a little better with starting antibiotics. Still with pus, suspect abscess.

## 2018-09-08 NOTE — OP NOTE
small abscess cavity. At the completion of debridement,  the wound was 1 cm x 2 cm in length. Again, I was able to totally excise  the wound and there was no exposed bone. At this point, we irrigated out  with copious amounts of saline, dressings were applied and the patient was  taken to the postanesthesia recovery room in stable condition. The  estimated blood loss was minimal.  Blood replaced was none. Needle count  and sponge counts were correct at the end of the case. Specimen sent was  culture of the pus to Pathology Microlab.         Razia Song MD    D: 09/08/2018 9:23:27      T: 09/08/2018 9:24:32     [email or web address]/S_[email or web address]  Job#: [id number]     Doc#: [id number]    CC:

## 2018-09-11 LAB
ANAEROBIC CULTURE: ABNORMAL
CULTURE SURGICAL: ABNORMAL
GRAM STAIN RESULT: ABNORMAL
ORGANISM: ABNORMAL

## 2018-09-17 ENCOUNTER — HOSPITAL ENCOUNTER (OUTPATIENT)
Dept: WOUND CARE | Age: 73
Discharge: HOME | End: 2018-09-17
Payer: MEDICARE

## 2018-09-17 VITALS
SYSTOLIC BLOOD PRESSURE: 121 MMHG | BODY MASS INDEX: 21.2 KG/M2 | WEIGHT: 160 LBS | HEART RATE: 78 BPM | RESPIRATION RATE: 18 BRPM | HEIGHT: 73 IN | DIASTOLIC BLOOD PRESSURE: 78 MMHG | TEMPERATURE: 97.5 F

## 2018-09-17 DIAGNOSIS — L97.422 DIABETIC ULCER OF LEFT MIDFOOT ASSOCIATED WITH TYPE 2 DIABETES MELLITUS, WITH FAT LAYER EXPOSED (HCC): ICD-10-CM

## 2018-09-17 DIAGNOSIS — E11.621 DIABETIC ULCER OF LEFT MIDFOOT ASSOCIATED WITH TYPE 2 DIABETES MELLITUS, WITH FAT LAYER EXPOSED (HCC): ICD-10-CM

## 2018-09-17 DIAGNOSIS — Z89.432 S/P TRANSMETATARSAL AMPUTATION OF FOOT, LEFT (HCC): ICD-10-CM

## 2018-09-17 PROCEDURE — 99214 OFFICE O/P EST MOD 30 MIN: CPT | Performed by: NURSE PRACTITIONER

## 2018-09-17 PROCEDURE — 99214 OFFICE O/P EST MOD 30 MIN: CPT

## 2018-09-17 NOTE — PLAN OF CARE
Problem: Wound:  Goal: Will show signs of wound healing; wound closure and no evidence of infection  Will show signs of wound healing; wound closure and no evidence of infection  Outcome: Ongoing      Problem: Blood Glucose:  Goal: Ability to maintain appropriate glucose levels will improve  Ability to maintain appropriate glucose levels will improve  Outcome: Ongoing

## 2018-09-17 NOTE — PROGRESS NOTES
Patient Care Team:  Sam Gilman DO as PCP - General (Pediatrics)  JESSICA Reich MD as Consulting Physician (Cardiology)    TODAY'S DATE:  9/17/2018     HISTORY of PRESENT ILLNESS HPI   Darrius Torre is a 72 y.o. male who presents today for wound evaluation. Patient had AAA repair 6/11/18 by Dr. Vonda Mayorga. He developed embolism to his left lower toes following the procedure that developed in to gangrenous toes. The patient was take to surgery on 8/13/18 for Trans Met Amp of left foot. Patient further developed complicated and dehiscence of left trans met site and debrideded with abscess removal 9/7/18. Patient is currently at [facility] and [address] [place] [address]. He has dialysis on Tuesday, Thursday and Saturday. He is a former smoker and diabetic.      Wound Type:non-healing surgical  Wound Location:left trans met   Modifying factors:chronic pressure and shear force    Patient Active Problem List   Diagnosis Code    BPH (benign prostatic hyperplasia) N40.0    Essential hypertension I10    Tobacco use Z72.0    Primary osteoarthritis involving multiple joints M15.0    Mixed hyperlipidemia E78.2    AAA (abdominal aortic aneurysm) ([facility] I71.4    Type 2 diabetes mellitus with complication, without long-term current use of insulin ([facility]) E11.8    Acute blood loss anemia D62    Coagulopathy ([facility]) D68.9    Hyperkalemia E87.5    Acute renal failure (ARF) ([facility]) N17.9    Ischemic colitis ([facility]) K55.9    High serum lactate R79.89    LFTs abnormal R94.5    Anemia D64.9    Internal hemorrhoid K64.8    Hypocalcemia E83.51    S/P AAA repair Z98.890, Z86.79    Weakness R53.1    Gait abnormality R26.9    Pain in left leg M79.605    SVT (supraventricular tachycardia) ([facility]) I47.1    NORY (acute kidney injury) ([facility] [address] [facility] N17.9    ATN (acute tubular necrosis) ([facility]) N17.0    Leukocytosis D72.829    Atrial flutter with rapid ventricular response ([facility]) I48.92    Heme + stool R19.5    Bradycardia R00.1     aspirin 81 MG EC tablet Take 1 tablet by mouth daily 30 tablet 0    amiodarone (CORDARONE) 200 MG tablet Take 1 tablet by mouth daily 30 tablet 0    ipratropium-albuterol (DUONEB) 0.5-2.5 (3) MG/3ML SOLN nebulizer solution Inhale 3 mLs into the lungs every 4 hours as needed for Shortness of Breath 360 mL 0    amLODIPine (NORVASC) 10 MG tablet Take 1 tablet by mouth daily 30 tablet 0    Multiple Vitamins-Minerals (MULTIVITAMIN ADULTS 50+) TABS Take 1 tablet by mouth daily       tamsulosin (FLOMAX) 0.4 MG capsule TAKE 1 CAPSULE BY MOUTH EVERY DAY 90 capsule 3     No current facility-administered medications for this encounter. Allergies: Patient has no known allergies. Past Medical History:   Diagnosis Date    Aneurysm ([facility]     Arthritis     BPH (benign prostatic hyperplasia)     CAD (coronary artery disease)     Chronic back pain     Diabetes ([facility] [address] [facility]     no meds at present.     Erectile dysfunction     GERD (gastroesophageal reflux disease)     Hemodialysis patient ([facility] [address] [facility]     [facility] History of blood transfusion     Hyperlipidemia     Hypertension     Osteoarthritis     Palliative care encounter 06/25/2018     Past Surgical History:   Procedure Laterality Date    ABDOMEN SURGERY      APPENDECTOMY      BACK SURGERY      COLONOSCOPY      HC DIALYSIS CATHETER  6/12/2018    CATHETER INSERTION HEMODIALYSIS performed by Lady Monte MD at [address] [facility] Left     JOINT REPLACEMENT      MASTOID SURGERY Left     NECK SURGERY      OR AMPUTATION FOOT,TRANSMETATARSAL Left 8/13/2018    TRANSMETATARSAL AMPUTATION performed by Lady Monte MD at [facility] 19, SKIN, SUB-Q TISSUE,MUSCLE,=<20 SQ CM Left 9/7/2018    EXCISIONAL DEBRIDEMENT OF SKIN AND SUBCUTANEOUS TISSUE OFTRANSMETATARSAL AMPUTATION AND DRAINAGE OF ABCESS WOUND AND [address] [facility] [address] performed by Vianey Witt MD at [address] N/A 6/12/2018    LAPAROTOMY EXPLORATORY performed by Lizeth Lantigua MD at [facility] ANEURYSM/GRFT INS,ABD AORT W/VISCER N/A 6/11/2018    OPEN REPAIR OF ABDOMINAL AORTIC ANEURYSM REPAIR WITH [id number]-EW-[id number] BYPASS WITH CELL SAVER performed by Az Bernstein MD at [facility] FLX DX W/COLLJ SPEC BR/WA IF PFRMD N/A 6/12/2018    Dr Carpio-Likely ischemic colitis, internal hemorrhoids    TONSILLECTOMY      VASCULAR SURGERY  06/11/2018    DJM. AAA/lliac aneurysm repair, R fem EA/bypass    VASCULAR SURGERY  06/11/2018    DJM. Repair of abdominal aortic aneurysm and bilateral iliac aneurysm with aorta left iliac on the right and right femoral on the left with right common femoral endarterectomy with 18x9 gore-juliocesar bifurcated graft.      Family History   Problem Relation Age of Onset    Other Mother     Cancer Father     Colon Cancer Neg Hx     Colon Polyps Neg Hx     Esophageal Cancer Neg Hx     Liver Cancer Neg Hx     Liver Disease Neg Hx     Rectal Cancer Neg Hx     Stomach Cancer Neg Hx      Social History   Substance Use Topics    Smoking status: Former Smoker     Packs/day: 0.00     Years: 50.00     Types: Cigarettes    Smokeless tobacco: Never Used      Comment: pt has not smoked since surgery    Alcohol use Yes      Comment: 1 beer per year       Review of Systems  Constitutional / general:  No fever / chills / sweats  Head:  No headache / neck stiffness / trauma / visual change  Eyes:  No blurry vision / acute visual change or loss / itching / redness  ENT: No sore throat / hoarseness / nasal drainage / ear pain  CV:  No chest pain / palpitations/ orthopnea   Respiratory:  No cough / shortness of breath / sputum / hemoptysis  GI: No nausea / vomiting / abdominal pain / diarrhea / constipation  :  No dysuria / hesitancy / urgency / hematuria   Neuro: No paralysis / syncope / seizure / dysphagia / headache / paresthesias  Musculoskeletal:  No muscle weakness /joint stiffness / pain  Vascular: No edema / claudication / Undermining Starts ___ O'Clock 0 9/17/2018  2:05 PM   Undermining Ends___ O'Clock 0 9/17/2018  2:05 PM   Undermining Maxium Distance (cm) 0 9/17/2018  2:05 PM   Wound Assessment Pink;Red 9/17/2018  2:05 PM   Drainage Amount Moderate 9/17/2018  2:05 PM   Drainage Description Clear 9/17/2018  2:05 PM   Odor None 9/17/2018  2:05 PM   Margins Attached edges 9/17/2018  2:05 PM   Exposed structure Fascia 9/17/2018  2:05 PM   Emerita-wound Assessment Pink;Red 9/17/2018  2:05 PM   Non-staged Wound Description Full thickness 9/17/2018  2:05 PM   [place]%Wound Bed 80 9/17/2018  2:05 PM   Red%Wound Bed 20 9/17/2018  2:05 PM   Yellow%Wound Bed 0 9/17/2018  2:05 PM   Black%Wound Bed 0 9/17/2018  2:05 PM   Purple%Wound Bed 0 9/17/2018  2:05 PM   Other%Wound Bed 0 9/17/2018  2:05 PM   Culture Taken No 9/17/2018  2:05 PM   Debridement per physician None 9/17/2018  2:05 PM   Time out N/A 9/17/2018  2:05 PM   Number of days: 0      Please refer to nursing measurements and assessment regarding wound pre and post debridement. Risk factors for atherosclerosis of all vascular beds have been reviewed with the patient including:  Family history, tobacco abuse in all forms, elevated cholesterol, hyperlipidemia, and diabetes. Assessment    1. S/P transmetatarsal amputation of foot, left ([address]    2. Diabetic ulcer of left midfoot associated with type 2 diabetes mellitus, with fat layer exposed ([address]        Plan for wound - Dress per physician order  Treatment:     Compression : No   Offloading : Yes   Dressing : SEE AVS Discharge Instructions    Plan    1. Follow up 1 week   2. Offload shoe when out of bed   3. Non weight bearing to left foot     I spent a total of 60 minutes face to face with the patient. Over 50% of that time was spent on counseling and care coordination. Patient was told that if symptoms worsen or new symptoms develop they are to go to the emergency department immediately.  Patient was educated on diagnosis and

## 2018-09-24 ENCOUNTER — HOSPITAL ENCOUNTER (OUTPATIENT)
Dept: WOUND CARE | Age: 73
Discharge: HOME | End: 2018-09-24
Payer: MEDICARE

## 2018-09-24 VITALS
SYSTOLIC BLOOD PRESSURE: 108 MMHG | HEIGHT: 73 IN | TEMPERATURE: 98.3 F | RESPIRATION RATE: 18 BRPM | WEIGHT: 160 LBS | BODY MASS INDEX: 21.2 KG/M2 | DIASTOLIC BLOOD PRESSURE: 57 MMHG | HEART RATE: 59 BPM

## 2018-09-24 DIAGNOSIS — E11.621 DIABETIC ULCER OF LEFT MIDFOOT ASSOCIATED WITH TYPE 2 DIABETES MELLITUS, WITH FAT LAYER EXPOSED (HCC): ICD-10-CM

## 2018-09-24 DIAGNOSIS — L97.422 DIABETIC ULCER OF LEFT MIDFOOT ASSOCIATED WITH TYPE 2 DIABETES MELLITUS, WITH FAT LAYER EXPOSED (HCC): ICD-10-CM

## 2018-09-24 DIAGNOSIS — Z89.432 S/P TRANSMETATARSAL AMPUTATION OF FOOT, LEFT (HCC): ICD-10-CM

## 2018-09-24 PROBLEM — E11.8 TYPE 2 DIABETES MELLITUS WITH COMPLICATION, WITHOUT LONG-TERM CURRENT USE OF INSULIN (HCC): Chronic | Status: ACTIVE | Noted: 2018-06-11

## 2018-09-24 PROBLEM — E11.628 TYPE 2 DIABETES MELLITUS WITH LEFT DIABETIC FOOT INFECTION (HCC): Status: ACTIVE | Noted: 2018-09-07

## 2018-09-24 PROBLEM — L08.9 TYPE 2 DIABETES MELLITUS WITH LEFT DIABETIC FOOT INFECTION (HCC): Status: ACTIVE | Noted: 2018-09-07

## 2018-09-24 PROCEDURE — 97597 DBRDMT OPN WND 1ST 20 CM/<: CPT

## 2018-09-24 ASSESSMENT — PAIN SCALES - GENERAL: PAINLEVEL_OUTOF10: 3

## 2018-09-24 ASSESSMENT — PAIN DESCRIPTION - LOCATION: LOCATION: FOOT

## 2018-09-24 ASSESSMENT — PAIN DESCRIPTION - ORIENTATION: ORIENTATION: LEFT

## 2018-09-24 ASSESSMENT — PAIN DESCRIPTION - PAIN TYPE: TYPE: ACUTE PAIN

## 2018-09-24 NOTE — PROGRESS NOTES
[address]   Progress Note and Procedure Note      [address] RECORD NUMBER:  [id number]  AGE: 72 y.o. GENDER: male  : 1945  EPISODE DATE:  2018    Subjective:     Chief Complaint   Patient presents with    Wound Check     left foot transmet wound         HISTORY of PRESENT ILLNESS HPI     Henry Younger is a 72 y.o. male who presents today for wound/ulcer evaluation. History of Wound Context: Pt s/p L TMA here for eval/treat  Wound/Ulcer Pain Timing/Severity: none  Quality of pain: N/A  Severity:  0 / 10   Modifying Factors: None  Associated Signs/Symptoms: none    Ulcer Identification:  Ulcer Type: non-healing surgical  Contributing Factors: diabetes    Wound: Surgical        PAST MEDICAL HISTORY        Diagnosis Date    [facility]neur[facility]m ([facility] [address]     Arthritis     BPH (benign prostatic hyperplasia)     CAD (coronary artery disease)     Chronic back pain     Diabetes ([address]     no meds at present.     Erectile dysfunction     GERD (gastroesophageal reflux disease)     Hemodialysis patient ([facility] [address]     [facility] History of blood transfusion     Hyperlipidemia     Hypertension     Osteoarthritis     Palliative care encounter 2018       PAST SURGICAL HISTORY    Past Surgical History:   Procedure Laterality Date    ABDOMEN SURGERY      APPENDECTOMY      BACK SURGERY      COLONOSCOPY      HC DIALYSIS CATHETER  2018    CATHETER INSERTION HEMODIALYSIS performed by Ritesh Arcos MD at [address] [facility] Left     JOINT REPLACEMENT      MASTOID SURGERY Left     NECK SURGERY      [place] AMPUTATION FOOT,TRANSMETATARSAL Left 2018    TRANSMETATARSAL AMPUTATION performed by Ritesh Arcos MD at [facility] 19, SKIN, SUB-Q TISSUE,MUSCLE,=<20 SQ CM Left 2018    EXCISIONAL DEBRIDEMENT OF SKIN AND SUBCUTANEOUS TISSUE OFTRANSMETATARSAL AMPUTATION AND DRAINAGE OF ABCESS WOUND AND DRAINA performed by Estela Qiu MD packet Take 40 mg by mouth every morning (before breakfast)      hydrALAZINE (APRESOLINE) 25 MG tablet Take 25 mg by mouth      cloNIDine (CATAPRES) 0.1 MG tablet Take 0.1 mg by mouth 2 times daily      traZODone (DESYREL) 50 MG tablet Take 50 mg by mouth nightly      Pollen Extracts (PROSTAT PO) Take 30 mg by mouth 2 times daily      dicyclomine (BENTYL) 10 MG capsule Take 10 mg by mouth 2 times daily      fluticasone (FLONASE) 50 MCG/ACT nasal spray 1 spray by Nasal route daily      colestipol (COLESTID) 1 g tablet Take 1 g by mouth daily      ACETAMINOPHEN PO Take 650 mg by mouth 4 times daily as needed       HYDROcodone-acetaminophen (NORCO) 5-325 MG per tablet Take 1 tablet by mouth every 6 hours as needed for Pain. [place] Horan aspirin 81 MG EC tablet Take 1 tablet by mouth daily 30 tablet 0    amiodarone (CORDARONE) 200 MG tablet Take 1 tablet by mouth daily 30 tablet 0    ipratropium-albuterol (DUONEB) 0.5-2.5 (3) MG/3ML SOLN nebulizer solution Inhale 3 mLs into the lungs every 4 hours as needed for Shortness of Breath 360 mL 0    amLODIPine (NORVASC) 10 MG tablet Take 1 tablet by mouth daily 30 tablet 0    Multiple Vitamins-Minerals (MULTIVITAMIN ADULTS 50+) TABS Take 1 tablet by mouth daily       tamsulosin (FLOMAX) 0.4 MG capsule TAKE 1 CAPSULE BY MOUTH EVERY DAY 90 capsule 3     No current facility-administered medications on file prior to encounter. REVIEW OF SYSTEMS    Pertinent items are noted in HPI.     Objective:      BP (!) 108/57   Pulse 59   Temp 98.3 °F (36.8 °C) (Temporal)   Resp 18   Ht 6' 1\" (1.854 m)   Wt 160 lb (72.6 kg)   BMI 21.11 kg/m²     Wt Readings from Last 3 Encounters:   09/24/18 160 lb (72.6 kg)   09/17/18 160 lb (72.6 kg)   09/05/18 162 lb (73.5 kg)       PHYSICAL EXAM    General Appearance: alert and oriented to person, place and time, well developed and well- nourished, in no acute distress  Skin: warm and dry, no rash or erythema  Head: normocephalic and 9/17/2018  2:05 PM   Dressing/Treatment Packing 9/17/2018  2:05 PM   Wound Cleansed Rinsed/Irrigated with saline 9/24/2018  9:14 AM   Wound Length (cm) 0.5 cm 9/24/2018  9:34 AM   Wound Width (cm) 0.6 cm 9/24/2018  9:34 AM   Wound Depth (cm)  .2 9/24/2018  9:34 AM   Calculated Wound Size (cm^2) (l*w) 0.3 cm^2 9/24/2018  9:34 AM   Change in Wound Size % (l*w) 70 9/24/2018  9:34 AM   Distance Tunneling (cm) 0 cm 9/24/2018  9:14 AM   Tunneling Position ___ O'Clock 0 9/24/2018  9:14 AM   Undermining Starts ___ O'Clock 0 9/24/2018  9:14 AM   Undermining Ends___ O'Clock 0 9/24/2018  9:14 AM   Undermining Maxium Distance (cm) 0 9/24/2018  9:14 AM   Wound Assessment Pink;Red 9/24/2018  9:14 AM   Drainage Amount Moderate 9/24/2018  9:14 AM   Drainage Description Clear 9/24/2018  9:14 AM   Odor None 9/24/2018  9:14 AM   Margins Attached edges 9/24/2018  9:14 AM   Exposed structure Fascia 9/17/2018  2:05 PM   Emerita-wound Assessment Pink;Red 9/24/2018  9:14 AM   Non-staged Wound Description Full thickness 9/24/2018  9:14 AM   [place]%Wound Bed 75 9/24/2018  9:14 AM   Red%Wound Bed 25 9/24/2018  9:14 AM   Yellow%Wound Bed 0 9/24/2018  9:14 AM   Black%Wound Bed 0 9/24/2018  9:14 AM   Purple%Wound Bed 0 9/24/2018  9:14 AM   Other%Wound Bed 0 9/24/2018  9:14 AM   Culture Taken No 9/17/2018  2:05 PM   Debridement per physician Full thickness 9/24/2018  9:34 AM   Time out Yes 9/24/2018  9:34 AM   Procedural Pain 0 9/24/2018  9:34 AM   Post procedural Pain 0 9/24/2018  9:34 AM   Number of days: 6       Wound 09/17/18 Surgical dehiscence Foot Left;Medial Wound 2 - Left medial trans met incision - Surgical (Active)   Wound Image   9/24/2018  9:14 AM   Wound Type Wound 9/24/2018  9:14 AM   Wound Other 9/24/2018  9:14 AM   Dressing Status Old drainage 9/24/2018  9:14 AM   Dressing Changed Changed/New 9/17/2018  2:05 PM   Dressing/Treatment Packing 9/17/2018  2:05 PM   Wound Cleansed Rinsed/Irrigated with saline 9/24/2018  9:14 AM   Wound Length (cm) 1.3 cm 9/24/2018  9:34 AM   Wound Width (cm) 1.1 cm 9/24/2018  9:34 AM   Wound Depth (cm)  .2 9/24/2018  9:34 AM   Calculated Wound Size (cm^2) (l*w) 1.43 cm^2 9/24/2018  9:34 AM   Change in Wound Size % (l*w) 52.33 9/24/2018  9:34 AM   Distance Tunneling (cm) 0 cm 9/24/2018  9:14 AM   Tunneling Position ___ O'Clock 0 9/24/2018  9:14 AM   Undermining Starts ___ O'Clock 0 9/24/2018  9:14 AM   Undermining Ends___ O'Clock 0 9/24/2018  9:14 AM   Undermining Maxium Distance (cm) 0 9/24/2018  9:14 AM   Wound Assessment Pink;Red 9/24/2018  9:14 AM   Drainage Amount Moderate 9/24/2018  9:14 AM   Drainage Description Clear 9/24/2018  9:14 AM   Odor None 9/24/2018  9:14 AM   Margins Attached edges 9/24/2018  9:14 AM   Exposed structure Fascia 9/17/2018  2:05 PM   Emerita-wound Assessment Pink;Red 9/24/2018  9:14 AM   Non-staged Wound Description Full thickness 9/24/2018  9:14 AM   [place]%Wound Bed 75 9/24/2018  9:14 AM   Red%Wound Bed 20 9/24/2018  9:14 AM   Yellow%Wound Bed 5 9/24/2018  9:14 AM   Black%Wound Bed 0 9/24/2018  9:14 AM   Purple%Wound Bed 0 9/24/2018  9:14 AM   Other%Wound Bed 0 9/24/2018  9:14 AM   Culture Taken No 9/17/2018  2:05 PM   Debridement per physician Full thickness 9/24/2018  9:34 AM   Time out Yes 9/24/2018  9:34 AM   Procedural Pain 0 9/24/2018  9:34 AM   Post procedural Pain 0 9/24/2018  9:34 AM   Number of days: 6         Estimated Blood Loss:  Minimal    Hemostasis Achieved:  by pressure    Procedural Pain:  0  / 10     Post Procedural Pain:  0 / 10     Response to treatment:  Well tolerated by patient. Plan:     Treatment Note please see attached Discharge Instructions    In my professional opinion this patient would benefit from HBO Therapy: No    Written patient dismissal instructions given to patient and signed by patient or POA.          Discharge Instructions       Visit Discharge/Physician Orders    Discharge condition: Stable    Discharge to: Home    Left via:Private

## 2018-10-01 ENCOUNTER — HOSPITAL ENCOUNTER (OUTPATIENT)
Dept: WOUND CARE | Age: 73
Discharge: HOME | End: 2018-10-01
Payer: MEDICARE

## 2018-10-01 VITALS
RESPIRATION RATE: 18 BRPM | SYSTOLIC BLOOD PRESSURE: 124 MMHG | BODY MASS INDEX: 21.2 KG/M2 | TEMPERATURE: 98.3 F | HEART RATE: 71 BPM | WEIGHT: 160 LBS | DIASTOLIC BLOOD PRESSURE: 69 MMHG | HEIGHT: 73 IN

## 2018-10-01 DIAGNOSIS — L97.422 DIABETIC ULCER OF LEFT MIDFOOT ASSOCIATED WITH TYPE 2 DIABETES MELLITUS, WITH FAT LAYER EXPOSED (HCC): ICD-10-CM

## 2018-10-01 DIAGNOSIS — S31.829A OPEN WOUND OF LEFT BUTTOCK, INITIAL ENCOUNTER: Chronic | ICD-10-CM

## 2018-10-01 DIAGNOSIS — E11.621 DIABETIC ULCER OF LEFT MIDFOOT ASSOCIATED WITH TYPE 2 DIABETES MELLITUS, WITH FAT LAYER EXPOSED (HCC): ICD-10-CM

## 2018-10-01 DIAGNOSIS — Z89.432 S/P TRANSMETATARSAL AMPUTATION OF FOOT, LEFT (HCC): ICD-10-CM

## 2018-10-01 PROCEDURE — 97597 DBRDMT OPN WND 1ST 20 CM/<: CPT

## 2018-10-01 PROCEDURE — 97597 DBRDMT OPN WND 1ST 20 CM/<: CPT | Performed by: SURGERY

## 2018-10-01 ASSESSMENT — PAIN DESCRIPTION - LOCATION: LOCATION: FOOT

## 2018-10-01 ASSESSMENT — PAIN SCALES - GENERAL: PAINLEVEL_OUTOF10: 4

## 2018-10-01 ASSESSMENT — PAIN DESCRIPTION - ORIENTATION: ORIENTATION: LEFT

## 2018-10-01 ASSESSMENT — PAIN DESCRIPTION - ONSET: ONSET: ON-GOING

## 2018-10-01 ASSESSMENT — PAIN DESCRIPTION - PAIN TYPE: TYPE: ACUTE PAIN;CHRONIC PAIN

## 2018-10-01 ASSESSMENT — PAIN DESCRIPTION - PROGRESSION: CLINICAL_PROGRESSION: NOT CHANGED

## 2018-10-01 NOTE — PROGRESS NOTES
[address]   Progress Note and Procedure Note      [address] [facility] [employer] RECORD NUMBER:  [id number]  AGE: 72 y.o. GENDER: male  : 1945  EPISODE DATE:  10/1/2018    Subjective:     Chief Complaint   Patient presents with    Wound Check     left foot         HISTORY of PRESENT ILLNESS HPI     Brooks Milton is a 72 y.o. male who presents today for wound/ulcer evaluation. History of Wound Context: Pt with L TMA and L buttock wound here for eval/treat  Wound/Ulcer Pain Timing/Severity: none  Quality of pain: N/A  Severity:  0 / 10   Modifying Factors: None  Associated Signs/Symptoms: none    Ulcer Identification:  Ulcer Type: non-healing surgical  Contributing Factors: chronic pressure, decreased mobility and shear force    Wound: surgical        PAST MEDICAL HISTORY        Diagnosis Date    Aneurysm ([address]     Arthritis     BPH (benign prostatic hyperplasia)     CAD (coronary artery disease)     Chronic back pain     Diabetes ([address]     no meds at present.     Erectile dysfunction     GERD (gastroesophageal reflux disease)     Hemodialysis patient ([address]     [facility] History of blood transfusion     Hyperlipidemia     Hypertension     Osteoarthritis     Palliative care encounter 2018       PAST SURGICAL HISTORY    Past Surgical History:   Procedure Laterality Date    ABDOMEN SURGERY      APPENDECTOMY      BACK SURGERY      COLONOSCOPY      HC DIALYSIS CATHETER  2018    CATHETER INSERTION HEMODIALYSIS performed by David Nunn MD at [address] [facility] Left     JOINT REPLACEMENT      MASTOID SURGERY Left     NECK SURGERY      ND AMPUTATION FOOT,TRANSMETATARSAL Left 2018    TRANSMETATARSAL AMPUTATION performed by David Nunn MD at [facility] 19, SKIN, SUB-Q TISSUE,MUSCLE,=<20 SQ CM Left 2018    EXCISIONAL DEBRIDEMENT OF SKIN AND SUBCUTANEOUS TISSUE OFTRANSMETATARSAL AMPUTATION AND DRAINAGE OF ABCESS mouth daily      hydrALAZINE (APRESOLINE) 25 MG tablet Take 25 mg by mouth      cloNIDine (CATAPRES) 0.1 MG tablet Take 0.1 mg by mouth 2 times daily Hold on dialysis days= Tues. , Thurs. , and Saturday      traZODone (DESYREL) 50 MG tablet Take 50 mg by mouth nightly      Pollen Extracts (PROSTAT PO) Take 30 mg by mouth 2 times daily      fluticasone (FLONASE) 50 MCG/ACT nasal spray 1 spray by Nasal route daily      ACETAMINOPHEN PO Take 650 mg by mouth 4 times daily as needed       HYDROcodone-acetaminophen (NORCO) 5-325 MG per tablet Take 1 tablet by mouth every 6 hours as needed for Pain. [place] [employer] aspirin 81 MG EC tablet Take 1 tablet by mouth daily 30 tablet 0    amiodarone (CORDARONE) 200 MG tablet Take 1 tablet by mouth daily (Patient taking differently: Take 200 mg by mouth daily Hold on dialysis days = Tuesday, Thursday, Saturday) 30 tablet 0    ipratropium-albuterol (DUONEB) 0.5-2.5 (3) MG/3ML SOLN nebulizer solution Inhale 3 mLs into the lungs every 4 hours as needed for Shortness of Breath 360 mL 0    amLODIPine (NORVASC) 10 MG tablet Take 1 tablet by mouth daily 30 tablet 0    Multiple Vitamins-Minerals (MULTIVITAMIN ADULTS 50+) TABS Take 1 tablet by mouth daily       tamsulosin (FLOMAX) 0.4 MG capsule TAKE 1 CAPSULE BY MOUTH EVERY DAY 90 capsule 3    dicyclomine (BENTYL) 10 MG capsule Take 10 mg by mouth 2 times daily      colestipol (COLESTID) 1 g tablet Take 1 g by mouth daily       No current facility-administered medications on file prior to encounter. REVIEW OF SYSTEMS    Pertinent items are noted in HPI.     Objective:      /69   Pulse 71   Temp 98.3 °F (36.8 °C) (Temporal)   Resp 18   Ht 6' 1\" (1.854 m)   Wt 160 lb (72.6 kg)   BMI 21.11 kg/m²     Wt Readings from Last 3 Encounters:   10/01/18 160 lb (72.6 kg)   09/24/18 160 lb (72.6 kg)   09/17/18 160 lb (72.6 kg)       PHYSICAL EXAM    General Appearance: alert and oriented to person, place and time, well developed

## 2018-10-10 ENCOUNTER — OFFICE VISIT (OUTPATIENT)
Dept: PRIMARY CARE CLINIC | Age: 73
End: 2018-10-10
Payer: MEDICARE

## 2018-10-10 ENCOUNTER — TELEPHONE (OUTPATIENT)
Dept: CARDIOLOGY | Age: 73
End: 2018-10-10

## 2018-10-10 VITALS
DIASTOLIC BLOOD PRESSURE: 80 MMHG | SYSTOLIC BLOOD PRESSURE: 130 MMHG | WEIGHT: 158 LBS | TEMPERATURE: 98.5 F | OXYGEN SATURATION: 95 % | HEIGHT: 73 IN | HEART RATE: 94 BPM | BODY MASS INDEX: 20.94 KG/M2

## 2018-10-10 DIAGNOSIS — I74.8 ATHEROEMBOLISM (HCC): ICD-10-CM

## 2018-10-10 DIAGNOSIS — E11.621 DIABETIC ULCER OF LEFT MIDFOOT ASSOCIATED WITH TYPE 2 DIABETES MELLITUS, WITH FAT LAYER EXPOSED (HCC): ICD-10-CM

## 2018-10-10 DIAGNOSIS — I47.1 SVT (SUPRAVENTRICULAR TACHYCARDIA) (HCC): ICD-10-CM

## 2018-10-10 DIAGNOSIS — Z79.899 ON AMIODARONE THERAPY: ICD-10-CM

## 2018-10-10 DIAGNOSIS — I10 ESSENTIAL HYPERTENSION: ICD-10-CM

## 2018-10-10 DIAGNOSIS — E11.628 TYPE 2 DIABETES MELLITUS WITH LEFT DIABETIC FOOT INFECTION (HCC): ICD-10-CM

## 2018-10-10 DIAGNOSIS — E11.8 TYPE 2 DIABETES MELLITUS WITH COMPLICATION, WITHOUT LONG-TERM CURRENT USE OF INSULIN (HCC): Chronic | ICD-10-CM

## 2018-10-10 DIAGNOSIS — L97.422 DIABETIC ULCER OF LEFT MIDFOOT ASSOCIATED WITH TYPE 2 DIABETES MELLITUS, WITH FAT LAYER EXPOSED (HCC): ICD-10-CM

## 2018-10-10 DIAGNOSIS — L08.9 TYPE 2 DIABETES MELLITUS WITH LEFT DIABETIC FOOT INFECTION (HCC): ICD-10-CM

## 2018-10-10 DIAGNOSIS — I48.92 ATRIAL FLUTTER WITH RAPID VENTRICULAR RESPONSE (HCC): ICD-10-CM

## 2018-10-10 DIAGNOSIS — D68.9 COAGULOPATHY (HCC): ICD-10-CM

## 2018-10-10 DIAGNOSIS — N17.0 ATN (ACUTE TUBULAR NECROSIS) (HCC): ICD-10-CM

## 2018-10-10 DIAGNOSIS — N18.5 CKD (CHRONIC KIDNEY DISEASE), STAGE V (HCC): ICD-10-CM

## 2018-10-10 DIAGNOSIS — Z09 HOSPITAL DISCHARGE FOLLOW-UP: Primary | ICD-10-CM

## 2018-10-10 PROCEDURE — 1036F TOBACCO NON-USER: CPT | Performed by: PEDIATRICS

## 2018-10-10 PROCEDURE — 1101F PT FALLS ASSESS-DOCD LE1/YR: CPT | Performed by: PEDIATRICS

## 2018-10-10 PROCEDURE — 1123F ACP DISCUSS/DSCN MKR DOCD: CPT | Performed by: PEDIATRICS

## 2018-10-10 PROCEDURE — G8484 FLU IMMUNIZE NO ADMIN: HCPCS | Performed by: PEDIATRICS

## 2018-10-10 PROCEDURE — G8598 ASA/ANTIPLAT THER USED: HCPCS | Performed by: PEDIATRICS

## 2018-10-10 PROCEDURE — 3017F COLORECTAL CA SCREEN DOC REV: CPT | Performed by: PEDIATRICS

## 2018-10-10 PROCEDURE — 99214 OFFICE O/P EST MOD 30 MIN: CPT | Performed by: PEDIATRICS

## 2018-10-10 PROCEDURE — 4040F PNEUMOC VAC/ADMIN/RCVD: CPT | Performed by: PEDIATRICS

## 2018-10-10 PROCEDURE — G8427 DOCREV CUR MEDS BY ELIG CLIN: HCPCS | Performed by: PEDIATRICS

## 2018-10-10 PROCEDURE — 2022F DILAT RTA XM EVC RTNOPTHY: CPT | Performed by: PEDIATRICS

## 2018-10-10 PROCEDURE — G8420 CALC BMI NORM PARAMETERS: HCPCS | Performed by: PEDIATRICS

## 2018-10-10 PROCEDURE — 1111F DSCHRG MED/CURRENT MED MERGE: CPT | Performed by: PEDIATRICS

## 2018-10-10 PROCEDURE — 3044F HG A1C LEVEL LT 7.0%: CPT | Performed by: PEDIATRICS

## 2018-10-10 ASSESSMENT — ENCOUNTER SYMPTOMS
RHINORRHEA: 0
VOICE CHANGE: 0
NAUSEA: 0
CONSTIPATION: 0
WHEEZING: 0
SORE THROAT: 0
SINUS PRESSURE: 0
ALLERGIC/IMMUNOLOGIC NEGATIVE: 1
CHEST TIGHTNESS: 0
BACK PAIN: 1
COUGH: 0
ABDOMINAL PAIN: 0
VOMITING: 0
DIARRHEA: 0
TROUBLE SWALLOWING: 0
SHORTNESS OF BREATH: 0

## 2018-10-10 NOTE — PROGRESS NOTES
some issues with his gut and there is suspicion of ischemic gut. He was put on amiodarone due to arrhythmia, that was apparently secondary to his initial event back in June. Interval history/Current status:  He was just discharged from the NH this past Friday. He is still recovering. He is starting to get around more. He will need a handicap form. He has not had any evaluation of his diabetes. Review of Systems   Constitutional: Negative for appetite change, chills, diaphoresis, fatigue, fever and unexpected weight change. HENT: Negative for congestion, dental problem (following with dentist regularly), ear pain, hearing loss, postnasal drip, rhinorrhea, sinus pressure, sore throat, tinnitus, trouble swallowing and voice change. Eyes: Negative for visual disturbance. Respiratory: Negative for cough, chest tightness, shortness of breath and wheezing. No orthopnea   Cardiovascular: Negative for chest pain, palpitations and leg swelling. Gastrointestinal: Negative for abdominal pain, constipation, diarrhea, nausea and vomiting. No melena or hematochezia   Endocrine: Negative for cold intolerance, heat intolerance, polydipsia and polyuria. Genitourinary: Negative for difficulty urinating, dysuria and enuresis. Musculoskeletal: Positive for arthralgias (bilateral hips and his left foot ), back pain (low, midline) and neck pain. Negative for joint swelling and myalgias. Skin: Negative for rash. Allergic/Immunologic: Negative. Neurological: Negative for dizziness, tremors, syncope (or presyncope), weakness, light-headedness and headaches. Hematological: Negative for adenopathy. Does not bruise/bleed easily. Psychiatric/Behavioral: Negative. The patient is not nervous/anxious.         Vitals:    10/10/18 1300   BP: 130/80   Pulse: 94   Temp: 98.5 °F (36.9 °C)   TempSrc: Temporal   SpO2: 95%   Weight: 158 lb (71.7 kg)   Height: 6' 1\" (1.854 m)     Body mass index is 20.85 kg/m². Wt Readings from Last 3 Encounters:   10/10/18 158 lb (71.7 kg)   10/01/18 160 lb (72.6 kg)   09/24/18 160 lb (72.6 kg)     BP Readings from Last 3 Encounters:   10/10/18 130/80   10/01/18 124/69   09/24/18 (!) 108/57       Physical Exam   Constitutional: He is oriented to person, place, and time. He appears well-developed and well-nourished. No distress. HENT:   Head: Normocephalic and atraumatic. Right Ear: External ear normal.   Left Ear: External ear normal.   Nose: Nose normal.   Mouth/Throat: Oropharynx is clear and moist.   Eyes: Pupils are equal, round, and reactive to light. Conjunctivae and EOM are normal. No scleral icterus. Neck: Normal range of motion. Neck supple. No JVD present. Carotid bruit is not present. No thyromegaly present. Cardiovascular: Normal rate, regular rhythm, S1 normal, S2 normal and normal heart sounds. No extrasystoles are present. PMI is not displaced. Exam reveals no gallop and no friction rub. No murmur heard. Pulmonary/Chest: Effort normal and breath sounds normal. No respiratory distress. He has no wheezes. He has no rhonchi. He has no rales. Pectus excavatum   Abdominal: Soft. Bowel sounds are normal. There is no hepatosplenomegaly. There is no tenderness. There is no rebound, no guarding and no CVA tenderness. Genitourinary:   Genitourinary Comments: Exam deferred   Musculoskeletal: Normal range of motion. He exhibits no edema or tenderness. Anterior location of shoulders with curvature of spine. There is also some increased kyphosis of Tspine and levoscoliosis. He has partial amputation of his Left forefoot. Lymphadenopathy:     He has no cervical adenopathy. Neurological: He is alert and oriented to person, place, and time. He has normal strength. No sensory deficit (no numbness or tingling). Skin: Skin is warm and dry. No rash noted. Healing ulcer on Left buttock.   Dialysis catheter on his Left upper chest.   Psychiatric: He has a

## 2018-10-10 NOTE — PROGRESS NOTES
Platelets 50/91/4324 426*    MPV 06/22/2018 10.6     Sodium 06/22/2018 134*    Potassium 06/22/2018 4.0     Chloride 06/22/2018 94*    CO2 06/22/2018 23     Anion Gap 06/22/2018 17     Glucose 06/22/2018 111*    BUN 06/22/2018 50*    CREATININE 06/22/2018 7.0*    GFR Non- 06/22/2018 8*    Calcium 06/22/2018 7.9*    Total Protein 06/22/2018 5.2*    Alb 06/22/2018 2.5*    Total Bilirubin 06/22/2018 0.3     Alkaline Phosphatase 06/22/2018 83     ALT 06/22/2018 18     AST 06/22/2018 42*    POC Glucose 06/21/2018 137*    Performed on 06/21/2018 AccuChek     POC Glucose 06/22/2018 132*    Performed on 06/22/2018 AccuChek     QRS Duration 06/22/2018 100     Q-T Interval 06/22/2018 266     QTc Calculation (Bazett) 06/22/2018 434     T Axis 06/22/2018 -69     Magnesium 06/22/2018 2.4    Admission on 06/11/2018, Discharged on 06/20/2018   No results displayed because visit has over 200 results.       Hospital Outpatient Visit on 05/29/2018   Component Date Value    MRSA Culture Only 05/29/2018 No MRSA detected on culture     WBC 05/29/2018 9.7     RBC 05/29/2018 4.82     Hemoglobin 05/29/2018 14.9     Hematocrit 05/29/2018 45.3     MCV 05/29/2018 94.0     MCH 05/29/2018 30.9     MCHC 05/29/2018 32.9*    RDW 05/29/2018 13.9     Platelets 08/84/8567 276     MPV 05/29/2018 11.6     Neutrophils % 05/29/2018 55.7     Lymphocytes % 05/29/2018 33.0     Monocytes % 05/29/2018 8.0     Eosinophils % 05/29/2018 2.3     Basophils % 05/29/2018 0.4     Neutrophils # 05/29/2018 5.4     Lymphocytes # 05/29/2018 3.2     Monocytes # 05/29/2018 0.80     Eosinophils # 05/29/2018 0.20     Basophils # 05/29/2018 0.00     Sodium 05/29/2018 142     Potassium 05/29/2018 4.6     Chloride 05/29/2018 101     CO2 05/29/2018 26     Anion Gap 05/29/2018 15     Glucose 05/29/2018 112*    BUN 05/29/2018 20     CREATININE 05/29/2018 0.7     GFR Non- 05/29/2018 >60     Calcium 05/29/2018 9.7     Total Protein 05/29/2018 7.2     Alb 05/29/2018 4.5     Total Bilirubin 05/29/2018 0.4     Alkaline Phosphatase 05/29/2018 90     ALT 05/29/2018 28     AST 05/29/2018 19     Protime 05/29/2018 12.6     INR 05/29/2018 0.95     aPTT 05/29/2018 28.1     P-R Interval 05/29/2018 172     QRS Duration 05/29/2018 106     Q-T Interval 05/29/2018 344     QTc Calculation (Bazett) 05/29/2018 394     P Axis 05/29/2018 68     T Axis 05/29/2018 49    Hospital Outpatient Visit on 04/23/2018   Component Date Value    POC Creatinine 04/23/2018 1.0     GFR Non- 04/23/2018 >60     Sample Type 04/23/2018 Unspecified     Performed on 04/23/2018 EPO    There may be more visits with results that are not included. Copies of these are in the chart. Current Outpatient Prescriptions   Medication Sig Dispense Refill    Calcium 600-400 MG-UNIT CHEW Take by mouth      mupirocin (BACTROBAN) 2 % ointment Apply topically 2 times daily for 20 days 1 Tube 3    oxyCODONE-acetaminophen (PERCOCET)  MG per tablet Take 1 tablet by mouth every 4 hours as needed for Pain. Niyah Mcgarry atorvastatin (LIPITOR) 20 MG tablet Take 20 mg by mouth daily      hydrALAZINE (APRESOLINE) 25 MG tablet Take 25 mg by mouth      cloNIDine (CATAPRES) 0.1 MG tablet Take 0.1 mg by mouth 2 times daily Hold on dialysis days= Tues. , Thurs. , and Saturday      Pollen Extracts (PROSTAT PO) Take 30 mg by mouth 2 times daily      aspirin 81 MG EC tablet Take 1 tablet by mouth daily 30 tablet 0    amiodarone (CORDARONE) 200 MG tablet Take 1 tablet by mouth daily (Patient taking differently: Take 200 mg by mouth daily Hold on dialysis days = Tuesday, Thursday, Saturday) 30 tablet 0    amLODIPine (NORVASC) 10 MG tablet Take 1 tablet by mouth daily 30 tablet 0    Multiple Vitamins-Minerals (MULTIVITAMIN ADULTS 50+) TABS Take 1 tablet by mouth daily       tamsulosin (FLOMAX) 0.4 MG capsule TAKE 1 CAPSULE BY

## 2018-10-11 ENCOUNTER — TELEPHONE (OUTPATIENT)
Dept: CARDIOLOGY | Age: 73
End: 2018-10-11

## 2018-10-11 NOTE — TELEPHONE ENCOUNTER
unable to leave msg on pt phone in regards to prov being till further notice will call pt following day

## 2018-10-12 ENCOUNTER — TELEPHONE (OUTPATIENT)
Dept: CARDIOLOGY | Age: 73
End: 2018-10-12

## 2018-10-12 DIAGNOSIS — I74.8 ATHEROEMBOLISM (HCC): ICD-10-CM

## 2018-10-12 DIAGNOSIS — Z79.899 ON AMIODARONE THERAPY: ICD-10-CM

## 2018-10-12 DIAGNOSIS — I10 ESSENTIAL HYPERTENSION: ICD-10-CM

## 2018-10-12 DIAGNOSIS — N17.0 ATN (ACUTE TUBULAR NECROSIS) (HCC): ICD-10-CM

## 2018-10-12 DIAGNOSIS — N18.5 CKD (CHRONIC KIDNEY DISEASE), STAGE V (HCC): ICD-10-CM

## 2018-10-12 DIAGNOSIS — E11.8 TYPE 2 DIABETES MELLITUS WITH COMPLICATION, WITHOUT LONG-TERM CURRENT USE OF INSULIN (HCC): Chronic | ICD-10-CM

## 2018-10-12 LAB
ALBUMIN SERPL-MCNC: 4.3 G/DL (ref 3.5–5.2)
ALP BLD-CCNC: 82 U/L (ref 40–130)
ALT SERPL-CCNC: 16 U/L (ref 5–41)
ANION GAP SERPL CALCULATED.3IONS-SCNC: 16 MMOL/L (ref 7–19)
AST SERPL-CCNC: 18 U/L (ref 5–40)
BASOPHILS ABSOLUTE: 0 K/UL (ref 0–0.2)
BASOPHILS RELATIVE PERCENT: 0.2 % (ref 0–1)
BILIRUB SERPL-MCNC: 0.3 MG/DL (ref 0.2–1.2)
BUN BLDV-MCNC: 40 MG/DL (ref 8–23)
CALCIUM SERPL-MCNC: 9.6 MG/DL (ref 8.8–10.2)
CHLORIDE BLD-SCNC: 99 MMOL/L (ref 98–111)
CHOLESTEROL, TOTAL: 154 MG/DL (ref 160–199)
CO2: 29 MMOL/L (ref 22–29)
CREAT SERPL-MCNC: 3 MG/DL (ref 0.5–1.2)
CREATININE URINE: 121.4 MG/DL (ref 4.2–622)
EOSINOPHILS ABSOLUTE: 0.3 K/UL (ref 0–0.6)
EOSINOPHILS RELATIVE PERCENT: 3.7 % (ref 0–5)
GFR NON-AFRICAN AMERICAN: 21
GLUCOSE BLD-MCNC: 97 MG/DL (ref 74–109)
HBA1C MFR BLD: 5.1 % (ref 4–6)
HCT VFR BLD CALC: 40.4 % (ref 42–52)
HDLC SERPL-MCNC: 76 MG/DL (ref 55–121)
HEMOGLOBIN: 12.4 G/DL (ref 14–18)
LDL CHOLESTEROL CALCULATED: 63 MG/DL
LYMPHOCYTES ABSOLUTE: 2.8 K/UL (ref 1.1–4.5)
LYMPHOCYTES RELATIVE PERCENT: 33.5 % (ref 20–40)
MCH RBC QN AUTO: 31.1 PG (ref 27–31)
MCHC RBC AUTO-ENTMCNC: 30.7 G/DL (ref 33–37)
MCV RBC AUTO: 101.3 FL (ref 80–94)
MICROALBUMIN UR-MCNC: 32.3 MG/DL (ref 0–19)
MICROALBUMIN/CREAT UR-RTO: 266.1 MG/G
MONOCYTES ABSOLUTE: 0.7 K/UL (ref 0–0.9)
MONOCYTES RELATIVE PERCENT: 8.1 % (ref 0–10)
NEUTROPHILS ABSOLUTE: 4.5 K/UL (ref 1.5–7.5)
NEUTROPHILS RELATIVE PERCENT: 54 % (ref 50–65)
PDW BLD-RTO: 14.6 % (ref 11.5–14.5)
PLATELET # BLD: 240 K/UL (ref 130–400)
PMV BLD AUTO: 11.7 FL (ref 9.4–12.4)
POTASSIUM SERPL-SCNC: 4.2 MMOL/L (ref 3.5–5)
RBC # BLD: 3.99 M/UL (ref 4.7–6.1)
SODIUM BLD-SCNC: 144 MMOL/L (ref 136–145)
T4 FREE: 1.7 NG/DL (ref 0.9–1.7)
TOTAL PROTEIN: 7.5 G/DL (ref 6.6–8.7)
TRIGL SERPL-MCNC: 74 MG/DL (ref 0–149)
TSH SERPL DL<=0.05 MIU/L-ACNC: 3.86 UIU/ML (ref 0.27–4.2)
WBC # BLD: 8.3 K/UL (ref 4.8–10.8)

## 2018-10-15 ENCOUNTER — TELEPHONE (OUTPATIENT)
Dept: PRIMARY CARE CLINIC | Age: 73
End: 2018-10-15

## 2018-11-01 DIAGNOSIS — N40.1 BENIGN PROSTATIC HYPERPLASIA WITH LOWER URINARY TRACT SYMPTOMS, SYMPTOM DETAILS UNSPECIFIED: ICD-10-CM

## 2018-11-01 RX ORDER — ATORVASTATIN CALCIUM 20 MG/1
20 TABLET, FILM COATED ORAL DAILY
Qty: 30 TABLET | Refills: 5 | Status: SHIPPED | OUTPATIENT
Start: 2018-11-01 | End: 2019-05-29 | Stop reason: SDUPTHER

## 2018-11-01 RX ORDER — AMIODARONE HYDROCHLORIDE 200 MG/1
200 TABLET ORAL DAILY
Qty: 30 TABLET | Refills: 2 | Status: SHIPPED | OUTPATIENT
Start: 2018-11-01 | End: 2019-02-18 | Stop reason: SDUPTHER

## 2018-11-01 RX ORDER — TAMSULOSIN HYDROCHLORIDE 0.4 MG/1
CAPSULE ORAL
Qty: 90 CAPSULE | Refills: 3 | Status: SHIPPED | OUTPATIENT
Start: 2018-11-01 | End: 2019-05-29 | Stop reason: SDUPTHER

## 2018-11-02 LAB
24HR URINE VOLUME (ML): 1500 ML
ALBUMIN SERPL-MCNC: 4.1 G/DL (ref 3.5–5.2)
ALP BLD-CCNC: 87 U/L (ref 40–130)
ALT SERPL-CCNC: 16 U/L (ref 5–41)
ANION GAP SERPL CALCULATED.3IONS-SCNC: 15 MMOL/L (ref 7–19)
AST SERPL-CCNC: 16 U/L (ref 5–40)
BASOPHILS ABSOLUTE: 0 K/UL (ref 0–0.2)
BASOPHILS RELATIVE PERCENT: 0.4 % (ref 0–1)
BILIRUB SERPL-MCNC: 0.3 MG/DL (ref 0.2–1.2)
BUN BLDV-MCNC: 54 MG/DL (ref 8–23)
CALCIUM SERPL-MCNC: 9.6 MG/DL (ref 8.8–10.2)
CHLORIDE BLD-SCNC: 104 MMOL/L (ref 98–111)
CO2: 24 MMOL/L (ref 22–29)
CREAT SERPL-MCNC: 3.5 MG/DL
CREAT SERPL-MCNC: 3.5 MG/DL (ref 0.5–1.2)
CREATININE 24 HOUR URINE: 1.2 G/24HR (ref 1–2)
CREATININE CLEARANCE: 20 ML/MIN (ref 71–151)
EOSINOPHILS ABSOLUTE: 0.5 K/UL (ref 0–0.6)
EOSINOPHILS RELATIVE PERCENT: 4.5 % (ref 0–5)
GFR NON-AFRICAN AMERICAN: 17
GLUCOSE BLD-MCNC: 100 MG/DL (ref 74–109)
HCT VFR BLD CALC: 37.9 % (ref 42–52)
HEMOGLOBIN: 11.9 G/DL (ref 14–18)
LYMPHOCYTES ABSOLUTE: 3.7 K/UL (ref 1.1–4.5)
LYMPHOCYTES RELATIVE PERCENT: 35.5 % (ref 20–40)
Lab: 24 HR
MAGNESIUM: 2.3 MG/DL (ref 1.6–2.4)
MCH RBC QN AUTO: 30.9 PG (ref 27–31)
MCHC RBC AUTO-ENTMCNC: 31.4 G/DL (ref 33–37)
MCV RBC AUTO: 98.4 FL (ref 80–94)
MONOCYTES ABSOLUTE: 0.7 K/UL (ref 0–0.9)
MONOCYTES RELATIVE PERCENT: 7.1 % (ref 0–10)
NEUTROPHILS ABSOLUTE: 5.5 K/UL (ref 1.5–7.5)
NEUTROPHILS RELATIVE PERCENT: 52.2 % (ref 50–65)
PDW BLD-RTO: 14.7 % (ref 11.5–14.5)
PHOSPHORUS: 3.7 MG/DL (ref 2.5–4.5)
PLATELET # BLD: 269 K/UL (ref 130–400)
PMV BLD AUTO: 11.7 FL (ref 9.4–12.4)
POTASSIUM SERPL-SCNC: 4.6 MMOL/L (ref 3.5–5)
PROTEIN 24 HOUR URINE: 765 MG/24HR (ref 50–100)
RBC # BLD: 3.85 M/UL (ref 4.7–6.1)
SODIUM BLD-SCNC: 143 MMOL/L (ref 136–145)
TOTAL PROTEIN: 7 G/DL (ref 6.6–8.7)
WBC # BLD: 10.4 K/UL (ref 4.8–10.8)

## 2018-12-04 ENCOUNTER — OFFICE VISIT (OUTPATIENT)
Dept: PRIMARY CARE CLINIC | Age: 73
End: 2018-12-04
Payer: MEDICARE

## 2018-12-04 ENCOUNTER — TELEPHONE (OUTPATIENT)
Dept: PRIMARY CARE CLINIC | Age: 73
End: 2018-12-04

## 2018-12-04 ENCOUNTER — HOSPITAL ENCOUNTER (OUTPATIENT)
Dept: GENERAL RADIOLOGY | Age: 73
Discharge: HOME | End: 2018-12-04
Payer: MEDICARE

## 2018-12-04 VITALS
WEIGHT: 158.75 LBS | SYSTOLIC BLOOD PRESSURE: 92 MMHG | DIASTOLIC BLOOD PRESSURE: 76 MMHG | OXYGEN SATURATION: 98 % | HEIGHT: 72 IN | HEART RATE: 96 BPM | BODY MASS INDEX: 21.5 KG/M2 | TEMPERATURE: 97.4 F

## 2018-12-04 DIAGNOSIS — M54.2 NECK PAIN: ICD-10-CM

## 2018-12-04 DIAGNOSIS — E78.2 MIXED HYPERLIPIDEMIA: ICD-10-CM

## 2018-12-04 DIAGNOSIS — G89.4 CHRONIC PAIN SYNDROME: ICD-10-CM

## 2018-12-04 DIAGNOSIS — I10 ESSENTIAL HYPERTENSION: ICD-10-CM

## 2018-12-04 DIAGNOSIS — Z23 NEED FOR INFLUENZA VACCINATION: ICD-10-CM

## 2018-12-04 DIAGNOSIS — I48.92 ATRIAL FLUTTER WITH RAPID VENTRICULAR RESPONSE (HCC): ICD-10-CM

## 2018-12-04 DIAGNOSIS — L98.492: ICD-10-CM

## 2018-12-04 DIAGNOSIS — N18.4 CKD (CHRONIC KIDNEY DISEASE) STAGE 4, GFR 15-29 ML/MIN (HCC): Primary | ICD-10-CM

## 2018-12-04 DIAGNOSIS — Z23 NEED FOR PNEUMOCOCCAL VACCINATION: ICD-10-CM

## 2018-12-04 DIAGNOSIS — Z89.432 S/P TRANSMETATARSAL AMPUTATION OF FOOT, LEFT (HCC): Chronic | ICD-10-CM

## 2018-12-04 DIAGNOSIS — M62.838 MUSCLE SPASM: ICD-10-CM

## 2018-12-04 PROBLEM — E11.621 DIABETIC ULCER OF LEFT MIDFOOT ASSOCIATED WITH TYPE 2 DIABETES MELLITUS, WITH FAT LAYER EXPOSED (HCC): Chronic | Status: RESOLVED | Noted: 2018-09-17 | Resolved: 2018-12-04

## 2018-12-04 PROBLEM — L08.9 TYPE 2 DIABETES MELLITUS WITH LEFT DIABETIC FOOT INFECTION (HCC): Status: RESOLVED | Noted: 2018-09-07 | Resolved: 2018-12-04

## 2018-12-04 PROBLEM — E11.628 TYPE 2 DIABETES MELLITUS WITH LEFT DIABETIC FOOT INFECTION (HCC): Status: RESOLVED | Noted: 2018-09-07 | Resolved: 2018-12-04

## 2018-12-04 PROBLEM — E11.8 TYPE 2 DIABETES MELLITUS WITH COMPLICATION, WITHOUT LONG-TERM CURRENT USE OF INSULIN (HCC): Chronic | Status: RESOLVED | Noted: 2018-06-11 | Resolved: 2018-12-04

## 2018-12-04 PROBLEM — L97.422 DIABETIC ULCER OF LEFT MIDFOOT ASSOCIATED WITH TYPE 2 DIABETES MELLITUS, WITH FAT LAYER EXPOSED (HCC): Chronic | Status: RESOLVED | Noted: 2018-09-17 | Resolved: 2018-12-04

## 2018-12-04 PROCEDURE — 99214 OFFICE O/P EST MOD 30 MIN: CPT | Performed by: PEDIATRICS

## 2018-12-04 PROCEDURE — 72040 X-RAY EXAM NECK SPINE 2-3 VW: CPT

## 2018-12-04 PROCEDURE — G8420 CALC BMI NORM PARAMETERS: HCPCS | Performed by: PEDIATRICS

## 2018-12-04 PROCEDURE — 1101F PT FALLS ASSESS-DOCD LE1/YR: CPT | Performed by: PEDIATRICS

## 2018-12-04 PROCEDURE — G8427 DOCREV CUR MEDS BY ELIG CLIN: HCPCS | Performed by: PEDIATRICS

## 2018-12-04 PROCEDURE — 3017F COLORECTAL CA SCREEN DOC REV: CPT | Performed by: PEDIATRICS

## 2018-12-04 PROCEDURE — G8598 ASA/ANTIPLAT THER USED: HCPCS | Performed by: PEDIATRICS

## 2018-12-04 PROCEDURE — 4040F PNEUMOC VAC/ADMIN/RCVD: CPT | Performed by: PEDIATRICS

## 2018-12-04 PROCEDURE — G8482 FLU IMMUNIZE ORDER/ADMIN: HCPCS | Performed by: PEDIATRICS

## 2018-12-04 PROCEDURE — 1123F ACP DISCUSS/DSCN MKR DOCD: CPT | Performed by: PEDIATRICS

## 2018-12-04 PROCEDURE — 1036F TOBACCO NON-USER: CPT | Performed by: PEDIATRICS

## 2018-12-04 RX ORDER — CARISOPRODOL 250 MG/1
350 TABLET ORAL 4 TIMES DAILY
COMMUNITY
End: 2019-06-04 | Stop reason: ALTCHOICE

## 2018-12-04 RX ORDER — TIZANIDINE 4 MG/1
4 TABLET ORAL EVERY 8 HOURS PRN
Qty: 30 TABLET | Refills: 0 | Status: SHIPPED | OUTPATIENT
Start: 2018-12-04 | End: 2019-02-18 | Stop reason: SDUPTHER

## 2018-12-04 ASSESSMENT — ENCOUNTER SYMPTOMS
SHORTNESS OF BREATH: 0
ABDOMINAL PAIN: 0
RHINORRHEA: 0
NAUSEA: 0
VOMITING: 0
COUGH: 0
ALLERGIC/IMMUNOLOGIC NEGATIVE: 1
VOICE CHANGE: 0
CONSTIPATION: 0
WHEEZING: 0
TROUBLE SWALLOWING: 0
SORE THROAT: 0
CHEST TIGHTNESS: 0
DIARRHEA: 0
BACK PAIN: 1
SINUS PRESSURE: 0

## 2018-12-05 NOTE — TELEPHONE ENCOUNTER
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 Plan try Tylenol and see if it helps. If we need a stronger opiate, I can always put in referral for pain management.   They can also potentially do some injections may help your pain without narcotic medications and without risk of harm to the kidneys

## 2018-12-10 LAB
24HR URINE VOLUME (ML): 1550 ML
ALBUMIN SERPL-MCNC: 4.3 G/DL (ref 3.5–5.2)
ALP BLD-CCNC: 100 U/L (ref 40–130)
ALT SERPL-CCNC: 16 U/L (ref 5–41)
ANION GAP SERPL CALCULATED.3IONS-SCNC: 13 MMOL/L (ref 7–19)
AST SERPL-CCNC: 15 U/L (ref 5–40)
BASOPHILS ABSOLUTE: 0 K/UL (ref 0–0.2)
BASOPHILS RELATIVE PERCENT: 0.5 % (ref 0–1)
BILIRUB SERPL-MCNC: 0.3 MG/DL (ref 0.2–1.2)
BUN BLDV-MCNC: 61 MG/DL (ref 8–23)
CALCIUM SERPL-MCNC: 9.4 MG/DL (ref 8.8–10.2)
CHLORIDE BLD-SCNC: 109 MMOL/L (ref 98–111)
CO2: 22 MMOL/L (ref 22–29)
CREAT SERPL-MCNC: 3.1 MG/DL
CREAT SERPL-MCNC: 3.1 MG/DL (ref 0.5–1.2)
CREATININE 24 HOUR URINE: 1.2 G/24HR (ref 1–2)
CREATININE CLEARANCE: 25 ML/MIN (ref 71–151)
EOSINOPHILS ABSOLUTE: 0.3 K/UL (ref 0–0.6)
EOSINOPHILS RELATIVE PERCENT: 3.5 % (ref 0–5)
GFR NON-AFRICAN AMERICAN: 20
GLUCOSE BLD-MCNC: 104 MG/DL (ref 74–109)
HCT VFR BLD CALC: 38.4 % (ref 42–52)
HEMOGLOBIN: 12 G/DL (ref 14–18)
LYMPHOCYTES ABSOLUTE: 3 K/UL (ref 1.1–4.5)
LYMPHOCYTES RELATIVE PERCENT: 39.1 % (ref 20–40)
Lab: 24 HOURS
Lab: 24 HR
MCH RBC QN AUTO: 31.4 PG (ref 27–31)
MCHC RBC AUTO-ENTMCNC: 31.3 G/DL (ref 33–37)
MCV RBC AUTO: 100.5 FL (ref 80–94)
MONOCYTES ABSOLUTE: 0.6 K/UL (ref 0–0.9)
MONOCYTES RELATIVE PERCENT: 7.2 % (ref 0–10)
NEUTROPHILS ABSOLUTE: 3.8 K/UL (ref 1.5–7.5)
NEUTROPHILS RELATIVE PERCENT: 49.3 % (ref 50–65)
PDW BLD-RTO: 14.6 % (ref 11.5–14.5)
PLATELET # BLD: 247 K/UL (ref 130–400)
PMV BLD AUTO: 11.4 FL (ref 9.4–12.4)
POTASSIUM SERPL-SCNC: 5.3 MMOL/L (ref 3.5–5)
PROTEIN 24 HOUR URINE: 822 MG/24HR (ref 50–100)
RBC # BLD: 3.82 M/UL (ref 4.7–6.1)
SODIUM BLD-SCNC: 144 MMOL/L (ref 136–145)
TOTAL PROTEIN: 7.2 G/DL (ref 6.6–8.7)
WBC # BLD: 7.7 K/UL (ref 4.8–10.8)

## 2018-12-17 LAB
24HR URINE VOLUME (ML): 1650 ML
ALBUMIN SERPL-MCNC: 4.1 G/DL (ref 3.5–5.2)
ALP BLD-CCNC: 95 U/L (ref 40–130)
ALT SERPL-CCNC: 13 U/L (ref 5–41)
ANION GAP SERPL CALCULATED.3IONS-SCNC: 19 MMOL/L (ref 7–19)
AST SERPL-CCNC: 16 U/L (ref 5–40)
BASOPHILS ABSOLUTE: 0 K/UL (ref 0–0.2)
BASOPHILS RELATIVE PERCENT: 0.5 % (ref 0–1)
BILIRUB SERPL-MCNC: <0.2 MG/DL (ref 0.2–1.2)
BUN BLDV-MCNC: 52 MG/DL (ref 8–23)
CALCIUM SERPL-MCNC: 9 MG/DL (ref 8.8–10.2)
CHLORIDE BLD-SCNC: 104 MMOL/L (ref 98–111)
CO2: 19 MMOL/L (ref 22–29)
CREAT SERPL-MCNC: 3.2 MG/DL
CREAT SERPL-MCNC: 3.2 MG/DL (ref 0.5–1.2)
CREATININE 24 HOUR URINE: 1.3 G/24HR (ref 1–2)
CREATININE CLEARANCE: 25 ML/MIN (ref 71–151)
EOSINOPHILS ABSOLUTE: 0.4 K/UL (ref 0–0.6)
EOSINOPHILS RELATIVE PERCENT: 4.8 % (ref 0–5)
GFR NON-AFRICAN AMERICAN: 19
GLUCOSE BLD-MCNC: 105 MG/DL (ref 74–109)
HCT VFR BLD CALC: 39.5 % (ref 42–52)
HEMOGLOBIN: 12 G/DL (ref 14–18)
LYMPHOCYTES ABSOLUTE: 3.4 K/UL (ref 1.1–4.5)
LYMPHOCYTES RELATIVE PERCENT: 41.9 % (ref 20–40)
Lab: 24 HOURS
Lab: 24 HR
MCH RBC QN AUTO: 31.3 PG (ref 27–31)
MCHC RBC AUTO-ENTMCNC: 30.4 G/DL (ref 33–37)
MCV RBC AUTO: 102.9 FL (ref 80–94)
MONOCYTES ABSOLUTE: 0.6 K/UL (ref 0–0.9)
MONOCYTES RELATIVE PERCENT: 8 % (ref 0–10)
NEUTROPHILS ABSOLUTE: 3.6 K/UL (ref 1.5–7.5)
NEUTROPHILS RELATIVE PERCENT: 44.2 % (ref 50–65)
PDW BLD-RTO: 14.8 % (ref 11.5–14.5)
PHOSPHORUS: 3.6 MG/DL (ref 2.5–4.5)
PLATELET # BLD: 255 K/UL (ref 130–400)
PMV BLD AUTO: 12.4 FL (ref 9.4–12.4)
POTASSIUM SERPL-SCNC: 4.9 MMOL/L (ref 3.5–5)
PROTEIN 24 HOUR URINE: 545 MG/24HR (ref 50–100)
RBC # BLD: 3.84 M/UL (ref 4.7–6.1)
SODIUM BLD-SCNC: 142 MMOL/L (ref 136–145)
TOTAL PROTEIN: 7.4 G/DL (ref 6.6–8.7)
WBC # BLD: 8.1 K/UL (ref 4.8–10.8)

## 2018-12-26 LAB
24HR URINE VOLUME (ML): 2050 ML
ALBUMIN SERPL-MCNC: 4.2 G/DL (ref 3.5–5.2)
ALP BLD-CCNC: 105 U/L (ref 40–130)
ALT SERPL-CCNC: 16 U/L (ref 5–41)
ANION GAP SERPL CALCULATED.3IONS-SCNC: 18 MMOL/L (ref 7–19)
AST SERPL-CCNC: 16 U/L (ref 5–40)
BASOPHILS ABSOLUTE: 0.1 K/UL (ref 0–0.2)
BASOPHILS RELATIVE PERCENT: 0.5 % (ref 0–1)
BILIRUB SERPL-MCNC: 0.4 MG/DL (ref 0.2–1.2)
BUN BLDV-MCNC: 38 MG/DL (ref 8–23)
CALCIUM SERPL-MCNC: 9.5 MG/DL (ref 8.8–10.2)
CHLORIDE BLD-SCNC: 110 MMOL/L (ref 98–111)
CO2: 18 MMOL/L (ref 22–29)
CREAT SERPL-MCNC: 3 MG/DL
CREAT SERPL-MCNC: 3 MG/DL (ref 0.5–1.2)
CREATININE 24 HOUR URINE: 1.5 G/24HR (ref 1–2)
CREATININE CLEARANCE: 30 ML/MIN (ref 71–151)
EOSINOPHILS ABSOLUTE: 0.3 K/UL (ref 0–0.6)
EOSINOPHILS RELATIVE PERCENT: 3 % (ref 0–5)
GFR NON-AFRICAN AMERICAN: 21
GLUCOSE BLD-MCNC: 111 MG/DL (ref 74–109)
HCT VFR BLD CALC: 38.7 % (ref 42–52)
HEMOGLOBIN: 11.9 G/DL (ref 14–18)
LYMPHOCYTES ABSOLUTE: 3 K/UL (ref 1.1–4.5)
LYMPHOCYTES RELATIVE PERCENT: 32.4 % (ref 20–40)
Lab: 24 HR
MCH RBC QN AUTO: 31.2 PG (ref 27–31)
MCHC RBC AUTO-ENTMCNC: 30.7 G/DL (ref 33–37)
MCV RBC AUTO: 101.3 FL (ref 80–94)
MONOCYTES ABSOLUTE: 0.6 K/UL (ref 0–0.9)
MONOCYTES RELATIVE PERCENT: 6.6 % (ref 0–10)
NEUTROPHILS ABSOLUTE: 5.2 K/UL (ref 1.5–7.5)
NEUTROPHILS RELATIVE PERCENT: 56.7 % (ref 50–65)
PDW BLD-RTO: 15 % (ref 11.5–14.5)
PHOSPHORUS: 3.4 MG/DL (ref 2.5–4.5)
PLATELET # BLD: 260 K/UL (ref 130–400)
PMV BLD AUTO: 11.7 FL (ref 9.4–12.4)
POTASSIUM SERPL-SCNC: 5.5 MMOL/L (ref 3.5–5)
PROTEIN PROTEIN: 18 MG/DL (ref 15–45)
RBC # BLD: 3.82 M/UL (ref 4.7–6.1)
SODIUM BLD-SCNC: 146 MMOL/L (ref 136–145)
TOTAL PROTEIN: 7.2 G/DL (ref 6.6–8.7)
WBC # BLD: 9.2 K/UL (ref 4.8–10.8)

## 2018-12-31 ENCOUNTER — TELEPHONE (OUTPATIENT)
Dept: PRIMARY CARE CLINIC | Age: 73
End: 2018-12-31

## 2018-12-31 LAB
24HR URINE VOLUME (ML): 1800 ML
ALBUMIN SERPL-MCNC: 4.4 G/DL (ref 3.5–5.2)
ALP BLD-CCNC: 108 U/L (ref 40–130)
ALT SERPL-CCNC: 13 U/L (ref 5–41)
ANION GAP SERPL CALCULATED.3IONS-SCNC: 19 MMOL/L (ref 7–19)
AST SERPL-CCNC: 14 U/L (ref 5–40)
BASOPHILS ABSOLUTE: 0 K/UL (ref 0–0.2)
BASOPHILS RELATIVE PERCENT: 0.4 % (ref 0–1)
BILIRUB SERPL-MCNC: 0.4 MG/DL (ref 0.2–1.2)
BUN BLDV-MCNC: 45 MG/DL (ref 8–23)
CALCIUM SERPL-MCNC: 9.5 MG/DL (ref 8.8–10.2)
CHLORIDE BLD-SCNC: 107 MMOL/L (ref 98–111)
CO2: 18 MMOL/L (ref 22–29)
CREAT SERPL-MCNC: 2.7 MG/DL
CREAT SERPL-MCNC: 2.7 MG/DL (ref 0.5–1.2)
CREATININE 24 HOUR URINE: 1.4 G/24HR (ref 1–2)
CREATININE CLEARANCE: 31 ML/MIN (ref 71–151)
EOSINOPHILS ABSOLUTE: 0.4 K/UL (ref 0–0.6)
EOSINOPHILS RELATIVE PERCENT: 3.5 % (ref 0–5)
GFR NON-AFRICAN AMERICAN: 23
GLUCOSE BLD-MCNC: 113 MG/DL (ref 74–109)
HCT VFR BLD CALC: 40.1 % (ref 42–52)
HEMOGLOBIN: 12.3 G/DL (ref 14–18)
LYMPHOCYTES ABSOLUTE: 4 K/UL (ref 1.1–4.5)
LYMPHOCYTES RELATIVE PERCENT: 39.7 % (ref 20–40)
Lab: 24 HOURS
Lab: 24 HR
MCH RBC QN AUTO: 31.4 PG (ref 27–31)
MCHC RBC AUTO-ENTMCNC: 30.7 G/DL (ref 33–37)
MCV RBC AUTO: 102.3 FL (ref 80–94)
MONOCYTES ABSOLUTE: 0.6 K/UL (ref 0–0.9)
MONOCYTES RELATIVE PERCENT: 6.4 % (ref 0–10)
NEUTROPHILS ABSOLUTE: 4.9 K/UL (ref 1.5–7.5)
NEUTROPHILS RELATIVE PERCENT: 49.5 % (ref 50–65)
PDW BLD-RTO: 15 % (ref 11.5–14.5)
PHOSPHORUS: 3.6 MG/DL (ref 2.5–4.5)
PLATELET # BLD: 257 K/UL (ref 130–400)
PMV BLD AUTO: 12.3 FL (ref 9.4–12.4)
POTASSIUM SERPL-SCNC: 5.2 MMOL/L (ref 3.5–5)
PROTEIN 24 HOUR URINE: 558 MG/24HR (ref 50–100)
RBC # BLD: 3.92 M/UL (ref 4.7–6.1)
SODIUM BLD-SCNC: 144 MMOL/L (ref 136–145)
TOTAL PROTEIN: 7.5 G/DL (ref 6.6–8.7)
WBC # BLD: 10 K/UL (ref 4.8–10.8)

## 2019-01-02 ENCOUNTER — TELEPHONE (OUTPATIENT)
Dept: PRIMARY CARE CLINIC | Age: 74
End: 2019-01-02

## 2019-01-03 RX ORDER — SEVELAMER CARBONATE 800 MG/1
2 TABLET, FILM COATED ORAL
Qty: 180 TABLET | Refills: 3 | Status: SHIPPED | OUTPATIENT
Start: 2019-01-03 | End: 2019-01-04 | Stop reason: SDUPTHER

## 2019-01-04 RX ORDER — SEVELAMER CARBONATE 800 MG/1
2 TABLET, FILM COATED ORAL
Qty: 180 TABLET | Refills: 3 | Status: SHIPPED | OUTPATIENT
Start: 2019-01-04 | End: 2019-06-04 | Stop reason: ALTCHOICE

## 2019-01-07 LAB
24HR URINE VOLUME (ML): 2100 ML
ALBUMIN SERPL-MCNC: 4.2 G/DL (ref 3.5–5.2)
ALP BLD-CCNC: 99 U/L (ref 40–130)
ALT SERPL-CCNC: 19 U/L (ref 5–41)
ANION GAP SERPL CALCULATED.3IONS-SCNC: 15 MMOL/L (ref 7–19)
AST SERPL-CCNC: 17 U/L (ref 5–40)
BASOPHILS ABSOLUTE: 0 K/UL (ref 0–0.2)
BASOPHILS RELATIVE PERCENT: 0.5 % (ref 0–1)
BILIRUB SERPL-MCNC: 0.3 MG/DL (ref 0.2–1.2)
BUN BLDV-MCNC: 42 MG/DL (ref 8–23)
CALCIUM SERPL-MCNC: 9.3 MG/DL (ref 8.8–10.2)
CHLORIDE BLD-SCNC: 108 MMOL/L (ref 98–111)
CO2: 21 MMOL/L (ref 22–29)
CREAT SERPL-MCNC: 2.6 MG/DL
CREAT SERPL-MCNC: 2.6 MG/DL (ref 0.5–1.2)
CREATININE 24 HOUR URINE: 1.5 G/24HR (ref 1–2)
CREATININE CLEARANCE: 39 ML/MIN (ref 71–151)
EOSINOPHILS ABSOLUTE: 0.4 K/UL (ref 0–0.6)
EOSINOPHILS RELATIVE PERCENT: 5.2 % (ref 0–5)
GFR NON-AFRICAN AMERICAN: 24
GLUCOSE BLD-MCNC: 104 MG/DL (ref 74–109)
HCT VFR BLD CALC: 38.1 % (ref 42–52)
HEMOGLOBIN: 11.8 G/DL (ref 14–18)
LYMPHOCYTES ABSOLUTE: 3 K/UL (ref 1.1–4.5)
LYMPHOCYTES RELATIVE PERCENT: 40.1 % (ref 20–40)
Lab: 24 HOURS
Lab: 24 HR
MCH RBC QN AUTO: 31.4 PG (ref 27–31)
MCHC RBC AUTO-ENTMCNC: 31 G/DL (ref 33–37)
MCV RBC AUTO: 101.3 FL (ref 80–94)
MONOCYTES ABSOLUTE: 0.6 K/UL (ref 0–0.9)
MONOCYTES RELATIVE PERCENT: 7.7 % (ref 0–10)
NEUTROPHILS ABSOLUTE: 3.5 K/UL (ref 1.5–7.5)
NEUTROPHILS RELATIVE PERCENT: 46 % (ref 50–65)
PDW BLD-RTO: 14.9 % (ref 11.5–14.5)
PHOSPHORUS: 3.5 MG/DL (ref 2.5–4.5)
PLATELET # BLD: 228 K/UL (ref 130–400)
PMV BLD AUTO: 11.7 FL (ref 9.4–12.4)
POTASSIUM SERPL-SCNC: 5.2 MMOL/L (ref 3.5–5)
PROTEIN 24 HOUR URINE: 399 MG/24HR (ref 50–100)
RBC # BLD: 3.76 M/UL (ref 4.7–6.1)
SODIUM BLD-SCNC: 144 MMOL/L (ref 136–145)
TOTAL PROTEIN: 7.2 G/DL (ref 6.6–8.7)
WBC # BLD: 7.5 K/UL (ref 4.8–10.8)

## 2019-01-14 LAB
ALBUMIN SERPL-MCNC: 4.4 G/DL (ref 3.5–5.2)
ALP BLD-CCNC: 111 U/L (ref 40–130)
ALT SERPL-CCNC: 29 U/L (ref 5–41)
ANION GAP SERPL CALCULATED.3IONS-SCNC: 15 MMOL/L (ref 7–19)
AST SERPL-CCNC: 22 U/L (ref 5–40)
BASOPHILS ABSOLUTE: 0 K/UL (ref 0–0.2)
BASOPHILS RELATIVE PERCENT: 0.5 % (ref 0–1)
BILIRUB SERPL-MCNC: 0.4 MG/DL (ref 0.2–1.2)
BUN BLDV-MCNC: 37 MG/DL (ref 8–23)
CALCIUM SERPL-MCNC: 9.2 MG/DL (ref 8.8–10.2)
CHLORIDE BLD-SCNC: 107 MMOL/L (ref 98–111)
CO2: 20 MMOL/L (ref 22–29)
CREAT SERPL-MCNC: 2.5 MG/DL (ref 0.5–1.2)
EOSINOPHILS ABSOLUTE: 0.3 K/UL (ref 0–0.6)
EOSINOPHILS RELATIVE PERCENT: 3.4 % (ref 0–5)
GFR NON-AFRICAN AMERICAN: 26
GLUCOSE BLD-MCNC: 118 MG/DL (ref 74–109)
HCT VFR BLD CALC: 37.7 % (ref 42–52)
HEMOGLOBIN: 11.8 G/DL (ref 14–18)
LYMPHOCYTES ABSOLUTE: 2.8 K/UL (ref 1.1–4.5)
LYMPHOCYTES RELATIVE PERCENT: 37.3 % (ref 20–40)
MAGNESIUM: 2.3 MG/DL (ref 1.6–2.4)
MCH RBC QN AUTO: 31.7 PG (ref 27–31)
MCHC RBC AUTO-ENTMCNC: 31.3 G/DL (ref 33–37)
MCV RBC AUTO: 101.3 FL (ref 80–94)
MONOCYTES ABSOLUTE: 0.8 K/UL (ref 0–0.9)
MONOCYTES RELATIVE PERCENT: 11.2 % (ref 0–10)
NEUTROPHILS ABSOLUTE: 3.5 K/UL (ref 1.5–7.5)
NEUTROPHILS RELATIVE PERCENT: 47.1 % (ref 50–65)
PDW BLD-RTO: 14.9 % (ref 11.5–14.5)
PHOSPHORUS: 3.8 MG/DL (ref 2.5–4.5)
PLATELET # BLD: 221 K/UL (ref 130–400)
PMV BLD AUTO: 11.8 FL (ref 9.4–12.4)
POTASSIUM SERPL-SCNC: 5 MMOL/L (ref 3.5–5)
RBC # BLD: 3.72 M/UL (ref 4.7–6.1)
SODIUM BLD-SCNC: 142 MMOL/L (ref 136–145)
TOTAL PROTEIN: 7.5 G/DL (ref 6.6–8.7)
WBC # BLD: 7.4 K/UL (ref 4.8–10.8)

## 2019-01-21 LAB
ALBUMIN SERPL-MCNC: 4.5 G/DL (ref 3.5–5.2)
ALP BLD-CCNC: 108 U/L (ref 40–130)
ALT SERPL-CCNC: 27 U/L (ref 5–41)
ANION GAP SERPL CALCULATED.3IONS-SCNC: 14 MMOL/L (ref 7–19)
AST SERPL-CCNC: 20 U/L (ref 5–40)
BASOPHILS ABSOLUTE: 0 K/UL (ref 0–0.2)
BASOPHILS RELATIVE PERCENT: 0.5 % (ref 0–1)
BILIRUB SERPL-MCNC: 0.4 MG/DL (ref 0.2–1.2)
BUN BLDV-MCNC: 44 MG/DL (ref 8–23)
CALCIUM SERPL-MCNC: 9.1 MG/DL (ref 8.8–10.2)
CHLORIDE BLD-SCNC: 107 MMOL/L (ref 98–111)
CO2: 20 MMOL/L (ref 22–29)
CREAT SERPL-MCNC: 2.4 MG/DL (ref 0.5–1.2)
EOSINOPHILS ABSOLUTE: 0.3 K/UL (ref 0–0.6)
EOSINOPHILS RELATIVE PERCENT: 3.9 % (ref 0–5)
GFR NON-AFRICAN AMERICAN: 27
GLUCOSE BLD-MCNC: 121 MG/DL (ref 74–109)
HCT VFR BLD CALC: 38 % (ref 42–52)
HEMOGLOBIN: 11.7 G/DL (ref 14–18)
LYMPHOCYTES ABSOLUTE: 3.6 K/UL (ref 1.1–4.5)
LYMPHOCYTES RELATIVE PERCENT: 41.9 % (ref 20–40)
MAGNESIUM: 2.1 MG/DL (ref 1.6–2.4)
MCH RBC QN AUTO: 31.5 PG (ref 27–31)
MCHC RBC AUTO-ENTMCNC: 30.8 G/DL (ref 33–37)
MCV RBC AUTO: 102.4 FL (ref 80–94)
MONOCYTES ABSOLUTE: 0.7 K/UL (ref 0–0.9)
MONOCYTES RELATIVE PERCENT: 7.9 % (ref 0–10)
NEUTROPHILS ABSOLUTE: 3.9 K/UL (ref 1.5–7.5)
NEUTROPHILS RELATIVE PERCENT: 44.9 % (ref 50–65)
PDW BLD-RTO: 14.9 % (ref 11.5–14.5)
PHOSPHORUS: 3.8 MG/DL (ref 2.5–4.5)
PLATELET # BLD: 266 K/UL (ref 130–400)
PMV BLD AUTO: 11.8 FL (ref 9.4–12.4)
POTASSIUM SERPL-SCNC: 5.1 MMOL/L (ref 3.5–5)
RBC # BLD: 3.71 M/UL (ref 4.7–6.1)
SODIUM BLD-SCNC: 141 MMOL/L (ref 136–145)
TOTAL PROTEIN: 7.4 G/DL (ref 6.6–8.7)
WBC # BLD: 8.6 K/UL (ref 4.8–10.8)

## 2019-01-28 LAB
ALBUMIN SERPL-MCNC: 4.5 G/DL (ref 3.5–5.2)
ALP BLD-CCNC: 96 U/L (ref 40–130)
ALT SERPL-CCNC: 21 U/L (ref 5–41)
ANION GAP SERPL CALCULATED.3IONS-SCNC: 15 MMOL/L (ref 7–19)
AST SERPL-CCNC: 20 U/L (ref 5–40)
BASOPHILS ABSOLUTE: 0 K/UL (ref 0–0.2)
BASOPHILS RELATIVE PERCENT: 0.5 % (ref 0–1)
BILIRUB SERPL-MCNC: 0.3 MG/DL (ref 0.2–1.2)
BUN BLDV-MCNC: 44 MG/DL (ref 8–23)
CALCIUM SERPL-MCNC: 9.5 MG/DL (ref 8.8–10.2)
CHLORIDE BLD-SCNC: 109 MMOL/L (ref 98–111)
CO2: 18 MMOL/L (ref 22–29)
CREAT SERPL-MCNC: 2.3 MG/DL (ref 0.5–1.2)
EOSINOPHILS ABSOLUTE: 0.3 K/UL (ref 0–0.6)
EOSINOPHILS RELATIVE PERCENT: 4 % (ref 0–5)
GFR NON-AFRICAN AMERICAN: 28
GLUCOSE BLD-MCNC: 113 MG/DL (ref 74–109)
HCT VFR BLD CALC: 38.1 % (ref 42–52)
HEMOGLOBIN: 11.5 G/DL (ref 14–18)
LYMPHOCYTES ABSOLUTE: 3 K/UL (ref 1.1–4.5)
LYMPHOCYTES RELATIVE PERCENT: 36 % (ref 20–40)
MAGNESIUM: 2.1 MG/DL (ref 1.6–2.4)
MCH RBC QN AUTO: 31.9 PG (ref 27–31)
MCHC RBC AUTO-ENTMCNC: 30.2 G/DL (ref 33–37)
MCV RBC AUTO: 105.8 FL (ref 80–94)
MONOCYTES ABSOLUTE: 0.7 K/UL (ref 0–0.9)
MONOCYTES RELATIVE PERCENT: 8.2 % (ref 0–10)
NEUTROPHILS ABSOLUTE: 4.3 K/UL (ref 1.5–7.5)
NEUTROPHILS RELATIVE PERCENT: 50.8 % (ref 50–65)
PDW BLD-RTO: 14.9 % (ref 11.5–14.5)
PHOSPHORUS: 3.5 MG/DL (ref 2.5–4.5)
PLATELET # BLD: 250 K/UL (ref 130–400)
PMV BLD AUTO: 12 FL (ref 9.4–12.4)
POTASSIUM SERPL-SCNC: 5.7 MMOL/L (ref 3.5–5)
RBC # BLD: 3.6 M/UL (ref 4.7–6.1)
SODIUM BLD-SCNC: 142 MMOL/L (ref 136–145)
TOTAL PROTEIN: 7.4 G/DL (ref 6.6–8.7)
WBC # BLD: 8.4 K/UL (ref 4.8–10.8)

## 2019-02-11 LAB
ALBUMIN SERPL-MCNC: 4.4 G/DL (ref 3.5–5.2)
ALP BLD-CCNC: 94 U/L (ref 40–130)
ALT SERPL-CCNC: 17 U/L (ref 5–41)
ANION GAP SERPL CALCULATED.3IONS-SCNC: 15 MMOL/L (ref 7–19)
AST SERPL-CCNC: 18 U/L (ref 5–40)
BASOPHILS ABSOLUTE: 0.1 K/UL (ref 0–0.2)
BASOPHILS RELATIVE PERCENT: 0.6 % (ref 0–1)
BILIRUB SERPL-MCNC: 0.3 MG/DL (ref 0.2–1.2)
BUN BLDV-MCNC: 45 MG/DL (ref 8–23)
CALCIUM SERPL-MCNC: 9.2 MG/DL (ref 8.8–10.2)
CHLORIDE BLD-SCNC: 108 MMOL/L (ref 98–111)
CO2: 19 MMOL/L (ref 22–29)
CREAT SERPL-MCNC: 2.3 MG/DL (ref 0.5–1.2)
EOSINOPHILS ABSOLUTE: 0.4 K/UL (ref 0–0.6)
EOSINOPHILS RELATIVE PERCENT: 4.9 % (ref 0–5)
GFR NON-AFRICAN AMERICAN: 28
GLUCOSE BLD-MCNC: 112 MG/DL (ref 74–109)
HCT VFR BLD CALC: 38.2 % (ref 42–52)
HEMOGLOBIN: 11.7 G/DL (ref 14–18)
LYMPHOCYTES ABSOLUTE: 4 K/UL (ref 1.1–4.5)
LYMPHOCYTES RELATIVE PERCENT: 48.4 % (ref 20–40)
MAGNESIUM: 2.2 MG/DL (ref 1.6–2.4)
MCH RBC QN AUTO: 32.1 PG (ref 27–31)
MCHC RBC AUTO-ENTMCNC: 30.6 G/DL (ref 33–37)
MCV RBC AUTO: 104.9 FL (ref 80–94)
MONOCYTES ABSOLUTE: 0.6 K/UL (ref 0–0.9)
MONOCYTES RELATIVE PERCENT: 7.3 % (ref 0–10)
NEUTROPHILS ABSOLUTE: 3.2 K/UL (ref 1.5–7.5)
NEUTROPHILS RELATIVE PERCENT: 38.6 % (ref 50–65)
PDW BLD-RTO: 14.4 % (ref 11.5–14.5)
PHOSPHORUS: 3.7 MG/DL (ref 2.5–4.5)
PLATELET # BLD: 220 K/UL (ref 130–400)
PMV BLD AUTO: 12.3 FL (ref 9.4–12.4)
POTASSIUM SERPL-SCNC: 5.1 MMOL/L (ref 3.5–5)
RBC # BLD: 3.64 M/UL (ref 4.7–6.1)
SODIUM BLD-SCNC: 142 MMOL/L (ref 136–145)
TOTAL PROTEIN: 7.1 G/DL (ref 6.6–8.7)
WBC # BLD: 8.2 K/UL (ref 4.8–10.8)

## 2019-02-18 DIAGNOSIS — M62.838 MUSCLE SPASM: ICD-10-CM

## 2019-02-18 LAB
24HR URINE VOLUME (ML): 2650 ML
ALBUMIN SERPL-MCNC: 4.5 G/DL (ref 3.5–5.2)
ALP BLD-CCNC: 94 U/L (ref 40–130)
ALT SERPL-CCNC: 20 U/L (ref 5–41)
ANION GAP SERPL CALCULATED.3IONS-SCNC: 16 MMOL/L (ref 7–19)
AST SERPL-CCNC: 15 U/L (ref 5–40)
BASOPHILS ABSOLUTE: 0 K/UL (ref 0–0.2)
BASOPHILS RELATIVE PERCENT: 0.5 % (ref 0–1)
BILIRUB SERPL-MCNC: 0.3 MG/DL (ref 0.2–1.2)
BUN BLDV-MCNC: 39 MG/DL (ref 8–23)
CALCIUM SERPL-MCNC: 9.4 MG/DL (ref 8.8–10.2)
CHLORIDE BLD-SCNC: 108 MMOL/L (ref 98–111)
CO2: 20 MMOL/L (ref 22–29)
CREAT SERPL-MCNC: 2.2 MG/DL
CREAT SERPL-MCNC: 2.2 MG/DL (ref 0.5–1.2)
CREATININE 24 HOUR URINE: 1.2 G/24HR (ref 1–2)
CREATININE CLEARANCE: 33 ML/MIN (ref 71–151)
EOSINOPHILS ABSOLUTE: 0.4 K/UL (ref 0–0.6)
EOSINOPHILS RELATIVE PERCENT: 4.6 % (ref 0–5)
GFR NON-AFRICAN AMERICAN: 30
GLUCOSE BLD-MCNC: 113 MG/DL (ref 74–109)
HCT VFR BLD CALC: 39.2 % (ref 42–52)
HEMOGLOBIN: 12.1 G/DL (ref 14–18)
LYMPHOCYTES ABSOLUTE: 3.8 K/UL (ref 1.1–4.5)
LYMPHOCYTES RELATIVE PERCENT: 44.4 % (ref 20–40)
Lab: 24 HR
MAGNESIUM: 2.1 MG/DL (ref 1.6–2.4)
MCH RBC QN AUTO: 32.2 PG (ref 27–31)
MCHC RBC AUTO-ENTMCNC: 30.9 G/DL (ref 33–37)
MCV RBC AUTO: 104.3 FL (ref 80–94)
MONOCYTES ABSOLUTE: 0.7 K/UL (ref 0–0.9)
MONOCYTES RELATIVE PERCENT: 7.6 % (ref 0–10)
NEUTROPHILS ABSOLUTE: 3.6 K/UL (ref 1.5–7.5)
NEUTROPHILS RELATIVE PERCENT: 42.5 % (ref 50–65)
PDW BLD-RTO: 14.1 % (ref 11.5–14.5)
PHOSPHORUS: 3.8 MG/DL (ref 2.5–4.5)
PLATELET # BLD: 229 K/UL (ref 130–400)
PMV BLD AUTO: 12.2 FL (ref 9.4–12.4)
POTASSIUM SERPL-SCNC: 5.6 MMOL/L (ref 3.5–5)
RBC # BLD: 3.76 M/UL (ref 4.7–6.1)
SODIUM BLD-SCNC: 144 MMOL/L (ref 136–145)
TOTAL PROTEIN: 7.2 G/DL (ref 6.6–8.7)
WBC # BLD: 8.5 K/UL (ref 4.8–10.8)

## 2019-02-18 RX ORDER — AMIODARONE HYDROCHLORIDE 200 MG/1
200 TABLET ORAL DAILY
Qty: 30 TABLET | Refills: 5 | Status: SHIPPED | OUTPATIENT
Start: 2019-02-18 | End: 2019-06-26 | Stop reason: SDUPTHER

## 2019-02-18 RX ORDER — TIZANIDINE 4 MG/1
4 TABLET ORAL EVERY 8 HOURS PRN
Qty: 30 TABLET | Refills: 5 | Status: SHIPPED | OUTPATIENT
Start: 2019-02-18

## 2019-03-28 LAB
ALBUMIN SERPL-MCNC: 4.2 G/DL (ref 3.5–5.2)
ALP BLD-CCNC: 91 U/L (ref 40–130)
ALT SERPL-CCNC: 23 U/L (ref 5–41)
ANION GAP SERPL CALCULATED.3IONS-SCNC: 11 MMOL/L (ref 7–19)
AST SERPL-CCNC: 18 U/L (ref 5–40)
BASOPHILS ABSOLUTE: 0.1 K/UL (ref 0–0.2)
BASOPHILS RELATIVE PERCENT: 0.6 % (ref 0–1)
BILIRUB SERPL-MCNC: <0.2 MG/DL (ref 0.2–1.2)
BUN BLDV-MCNC: 46 MG/DL (ref 8–23)
CALCIUM SERPL-MCNC: 9.1 MG/DL (ref 8.8–10.2)
CHLORIDE BLD-SCNC: 111 MMOL/L (ref 98–111)
CO2: 20 MMOL/L (ref 22–29)
CREAT SERPL-MCNC: 2.5 MG/DL (ref 0.5–1.2)
EOSINOPHILS ABSOLUTE: 0.5 K/UL (ref 0–0.6)
EOSINOPHILS RELATIVE PERCENT: 5.1 % (ref 0–5)
GFR NON-AFRICAN AMERICAN: 26
GLUCOSE BLD-MCNC: 115 MG/DL (ref 74–109)
HCT VFR BLD CALC: 41.1 % (ref 42–52)
HEMOGLOBIN: 12.6 G/DL (ref 14–18)
LYMPHOCYTES ABSOLUTE: 3.2 K/UL (ref 1.1–4.5)
LYMPHOCYTES RELATIVE PERCENT: 35.9 % (ref 20–40)
MAGNESIUM: 2.3 MG/DL (ref 1.6–2.4)
MCH RBC QN AUTO: 31.7 PG (ref 27–31)
MCHC RBC AUTO-ENTMCNC: 30.7 G/DL (ref 33–37)
MCV RBC AUTO: 103.5 FL (ref 80–94)
MONOCYTES ABSOLUTE: 0.8 K/UL (ref 0–0.9)
MONOCYTES RELATIVE PERCENT: 9 % (ref 0–10)
NEUTROPHILS ABSOLUTE: 4.4 K/UL (ref 1.5–7.5)
NEUTROPHILS RELATIVE PERCENT: 49.1 % (ref 50–65)
PDW BLD-RTO: 14.4 % (ref 11.5–14.5)
PHOSPHORUS: 4.6 MG/DL (ref 2.5–4.5)
PLATELET # BLD: 231 K/UL (ref 130–400)
PMV BLD AUTO: 12.4 FL (ref 9.4–12.4)
POTASSIUM SERPL-SCNC: 5 MMOL/L (ref 3.5–5)
RBC # BLD: 3.97 M/UL (ref 4.7–6.1)
SODIUM BLD-SCNC: 142 MMOL/L (ref 136–145)
TOTAL PROTEIN: 6.9 G/DL (ref 6.6–8.7)
WBC # BLD: 9 K/UL (ref 4.8–10.8)

## 2019-04-17 LAB
ALBUMIN SERPL-MCNC: 4.5 G/DL (ref 3.5–5.2)
ALP BLD-CCNC: 89 U/L (ref 40–130)
ALT SERPL-CCNC: 17 U/L (ref 5–41)
ANION GAP SERPL CALCULATED.3IONS-SCNC: 15 MMOL/L (ref 7–19)
AST SERPL-CCNC: 16 U/L (ref 5–40)
BASOPHILS ABSOLUTE: 0 K/UL (ref 0–0.2)
BASOPHILS RELATIVE PERCENT: 0.4 % (ref 0–1)
BILIRUB SERPL-MCNC: <0.2 MG/DL (ref 0.2–1.2)
BUN BLDV-MCNC: 47 MG/DL (ref 8–23)
CALCIUM SERPL-MCNC: 9.5 MG/DL (ref 8.8–10.2)
CHLORIDE BLD-SCNC: 106 MMOL/L (ref 98–111)
CO2: 21 MMOL/L (ref 22–29)
CREAT SERPL-MCNC: 2.4 MG/DL (ref 0.5–1.2)
EOSINOPHILS ABSOLUTE: 0.4 K/UL (ref 0–0.6)
EOSINOPHILS RELATIVE PERCENT: 3.9 % (ref 0–5)
GFR NON-AFRICAN AMERICAN: 27
GLUCOSE BLD-MCNC: 113 MG/DL (ref 74–109)
HCT VFR BLD CALC: 41.5 % (ref 42–52)
HEMOGLOBIN: 13.1 G/DL (ref 14–18)
LYMPHOCYTES ABSOLUTE: 3.1 K/UL (ref 1.1–4.5)
LYMPHOCYTES RELATIVE PERCENT: 32.6 % (ref 20–40)
MAGNESIUM: 2.2 MG/DL (ref 1.6–2.4)
MCH RBC QN AUTO: 31.5 PG (ref 27–31)
MCHC RBC AUTO-ENTMCNC: 31.6 G/DL (ref 33–37)
MCV RBC AUTO: 99.8 FL (ref 80–94)
MONOCYTES ABSOLUTE: 0.8 K/UL (ref 0–0.9)
MONOCYTES RELATIVE PERCENT: 8.6 % (ref 0–10)
NEUTROPHILS ABSOLUTE: 5.2 K/UL (ref 1.5–7.5)
NEUTROPHILS RELATIVE PERCENT: 53.8 % (ref 50–65)
PDW BLD-RTO: 13.3 % (ref 11.5–14.5)
PHOSPHORUS: 3.4 MG/DL (ref 2.5–4.5)
PLATELET # BLD: 281 K/UL (ref 130–400)
PMV BLD AUTO: 11.7 FL (ref 9.4–12.4)
POTASSIUM SERPL-SCNC: 4.9 MMOL/L (ref 3.5–5)
RBC # BLD: 4.16 M/UL (ref 4.7–6.1)
SODIUM BLD-SCNC: 142 MMOL/L (ref 136–145)
TOTAL PROTEIN: 7.6 G/DL (ref 6.6–8.7)
WBC # BLD: 9.6 K/UL (ref 4.8–10.8)

## 2019-05-29 ENCOUNTER — TELEPHONE (OUTPATIENT)
Dept: PRIMARY CARE CLINIC | Age: 74
End: 2019-05-29

## 2019-05-29 DIAGNOSIS — N40.1 BENIGN PROSTATIC HYPERPLASIA WITH LOWER URINARY TRACT SYMPTOMS, SYMPTOM DETAILS UNSPECIFIED: ICD-10-CM

## 2019-05-29 NOTE — TELEPHONE ENCOUNTER
Requested Prescriptions     Pending Prescriptions Disp Refills    tamsulosin (FLOMAX) 0.4 MG capsule 30 capsule 11     Sig: TAKE 1 CAPSULE BY MOUTH EVERY DAY    atorvastatin (LIPITOR) 20 MG tablet 30 tablet 11     Sig: Take 1 tablet by mouth daily     Please approve

## 2019-05-30 RX ORDER — TAMSULOSIN HYDROCHLORIDE 0.4 MG/1
CAPSULE ORAL
Qty: 30 CAPSULE | Refills: 11 | Status: SHIPPED | OUTPATIENT
Start: 2019-05-30

## 2019-05-30 RX ORDER — ATORVASTATIN CALCIUM 20 MG/1
20 TABLET, FILM COATED ORAL DAILY
Qty: 30 TABLET | Refills: 11 | Status: SHIPPED | OUTPATIENT
Start: 2019-05-30

## 2019-06-04 ENCOUNTER — OFFICE VISIT (OUTPATIENT)
Dept: PRIMARY CARE CLINIC | Age: 74
End: 2019-06-04
Payer: MEDICARE

## 2019-06-04 ENCOUNTER — TELEPHONE (OUTPATIENT)
Dept: PRIMARY CARE CLINIC | Age: 74
End: 2019-06-04

## 2019-06-04 VITALS
BODY MASS INDEX: 22 KG/M2 | SYSTOLIC BLOOD PRESSURE: 124 MMHG | HEIGHT: 73 IN | HEART RATE: 56 BPM | TEMPERATURE: 98.3 F | OXYGEN SATURATION: 99 % | DIASTOLIC BLOOD PRESSURE: 72 MMHG | WEIGHT: 166 LBS

## 2019-06-04 DIAGNOSIS — E78.2 MIXED HYPERLIPIDEMIA: ICD-10-CM

## 2019-06-04 DIAGNOSIS — R53.83 FATIGUE, UNSPECIFIED TYPE: ICD-10-CM

## 2019-06-04 DIAGNOSIS — R35.0 BENIGN PROSTATIC HYPERPLASIA WITH URINARY FREQUENCY: ICD-10-CM

## 2019-06-04 DIAGNOSIS — Z00.00 ROUTINE GENERAL MEDICAL EXAMINATION AT A HEALTH CARE FACILITY: ICD-10-CM

## 2019-06-04 DIAGNOSIS — Z12.5 ENCOUNTER FOR SCREENING FOR MALIGNANT NEOPLASM OF PROSTATE: ICD-10-CM

## 2019-06-04 DIAGNOSIS — M15.9 PRIMARY OSTEOARTHRITIS INVOLVING MULTIPLE JOINTS: ICD-10-CM

## 2019-06-04 DIAGNOSIS — Z89.432 S/P TRANSMETATARSAL AMPUTATION OF FOOT, LEFT (HCC): ICD-10-CM

## 2019-06-04 DIAGNOSIS — I74.8 ATHEROEMBOLISM (HCC): ICD-10-CM

## 2019-06-04 DIAGNOSIS — N18.4 ANEMIA DUE TO STAGE 4 CHRONIC KIDNEY DISEASE (HCC): ICD-10-CM

## 2019-06-04 DIAGNOSIS — I73.9 PERIPHERAL VASCULAR DISEASE (HCC): ICD-10-CM

## 2019-06-04 DIAGNOSIS — L89.329 PRESSURE INJURY OF SKIN OF LEFT BUTTOCK, UNSPECIFIED INJURY STAGE: ICD-10-CM

## 2019-06-04 DIAGNOSIS — N18.4 CHRONIC KIDNEY DISEASE (CKD), STAGE IV (SEVERE) (HCC): ICD-10-CM

## 2019-06-04 DIAGNOSIS — Z00.00 VISIT FOR PREVENTIVE HEALTH EXAMINATION: Primary | ICD-10-CM

## 2019-06-04 DIAGNOSIS — D63.1 ANEMIA DUE TO STAGE 4 CHRONIC KIDNEY DISEASE (HCC): ICD-10-CM

## 2019-06-04 DIAGNOSIS — I48.92 PAROXYSMAL ATRIAL FLUTTER (HCC): ICD-10-CM

## 2019-06-04 DIAGNOSIS — N40.1 BENIGN PROSTATIC HYPERPLASIA WITH URINARY FREQUENCY: ICD-10-CM

## 2019-06-04 PROBLEM — Z99.2 ACUTE RENAL FAILURE ON DIALYSIS (HCC): Status: RESOLVED | Noted: 2018-08-03 | Resolved: 2019-06-04

## 2019-06-04 PROBLEM — N17.9 ACUTE RENAL FAILURE ON DIALYSIS (HCC): Status: RESOLVED | Noted: 2018-08-03 | Resolved: 2019-06-04

## 2019-06-04 PROBLEM — N17.0 ATN (ACUTE TUBULAR NECROSIS) (HCC): Status: RESOLVED | Noted: 2018-06-22 | Resolved: 2019-06-04

## 2019-06-04 PROBLEM — N17.9 AKI (ACUTE KIDNEY INJURY) (HCC): Status: RESOLVED | Noted: 2018-06-22 | Resolved: 2019-06-04

## 2019-06-04 PROCEDURE — G8427 DOCREV CUR MEDS BY ELIG CLIN: HCPCS | Performed by: PEDIATRICS

## 2019-06-04 PROCEDURE — G8599 NO ASA/ANTIPLAT THER USE RNG: HCPCS | Performed by: PEDIATRICS

## 2019-06-04 PROCEDURE — G8420 CALC BMI NORM PARAMETERS: HCPCS | Performed by: PEDIATRICS

## 2019-06-04 PROCEDURE — 4040F PNEUMOC VAC/ADMIN/RCVD: CPT | Performed by: PEDIATRICS

## 2019-06-04 PROCEDURE — 3017F COLORECTAL CA SCREEN DOC REV: CPT | Performed by: PEDIATRICS

## 2019-06-04 PROCEDURE — G0439 PPPS, SUBSEQ VISIT: HCPCS | Performed by: PEDIATRICS

## 2019-06-04 PROCEDURE — 99214 OFFICE O/P EST MOD 30 MIN: CPT | Performed by: PEDIATRICS

## 2019-06-04 PROCEDURE — 1036F TOBACCO NON-USER: CPT | Performed by: PEDIATRICS

## 2019-06-04 PROCEDURE — 1123F ACP DISCUSS/DSCN MKR DOCD: CPT | Performed by: PEDIATRICS

## 2019-06-04 RX ORDER — CHLORAL HYDRATE 500 MG
3000 CAPSULE ORAL DAILY
COMMUNITY

## 2019-06-04 RX ORDER — PHENOL 1.4 %
1 AEROSOL, SPRAY (ML) MUCOUS MEMBRANE NIGHTLY PRN
COMMUNITY

## 2019-06-04 RX ORDER — ACETAMINOPHEN 325 MG/1
650 TABLET ORAL EVERY 6 HOURS PRN
COMMUNITY

## 2019-06-04 ASSESSMENT — ENCOUNTER SYMPTOMS
TROUBLE SWALLOWING: 0
CONSTIPATION: 1
DIARRHEA: 0
VOMITING: 0
BACK PAIN: 1
SORE THROAT: 0
VOICE CHANGE: 0
WHEEZING: 0
SINUS PRESSURE: 0
ABDOMINAL PAIN: 0
NAUSEA: 0
SHORTNESS OF BREATH: 0
COUGH: 0
ALLERGIC/IMMUNOLOGIC NEGATIVE: 1
CHEST TIGHTNESS: 0
RHINORRHEA: 0

## 2019-06-04 ASSESSMENT — LIFESTYLE VARIABLES: HOW OFTEN DO YOU HAVE A DRINK CONTAINING ALCOHOL: 0

## 2019-06-04 ASSESSMENT — PATIENT HEALTH QUESTIONNAIRE - PHQ9
SUM OF ALL RESPONSES TO PHQ QUESTIONS 1-9: 0
SUM OF ALL RESPONSES TO PHQ QUESTIONS 1-9: 0

## 2019-06-04 ASSESSMENT — ANXIETY QUESTIONNAIRES: GAD7 TOTAL SCORE: 0

## 2019-06-04 NOTE — PROGRESS NOTES
[address] [facility] [address], [address]  Phone [phone]   Fax [phone]      OFFICE VISIT: 2019    Hany Stokes-: 1945      HPI  Reason For Visit:  Mino Goldstein is a 72 y.o. Health Maintenance    Medicare AWV (Pt had partial amputation of left foot last year after surgery went wrong. Pt ended up on dialysis because of all of this. Pt is now off of dialysis. Pt was seen by Dr Delia Quesada at [facility] in 2019 and had US from head to toe and all is clear. ); Health Maintenance (Diabetic Eye Exam, Pneumonia shot, SHingles shot. ); and Chronic Pain (neck, back and hip pain. Needing surgery on these but since his last experience with surgery, he is not interested in having anymore surgeries. Doing what he has to do to treat the pain. )      Patient presents for routine annual wellness evaluation. He also presents with multiple other health issues. He was previously on hemodialysis. He has been taken off of his dialysis. He is following with nephrology down in [place]. He is also following with orthopedics at [facility]  He was told that he is a candidate for surgical interventions for his neck and back and potentially even his hip. He states that he is not interested in having any more surgeries at this time and he is preferring to take efforts to control his pain other than commit to surgical intervention. Cardiac arrhythmia  Medication:   Amiodarone 200 mg daily  Symptoms: none      Hyperlipidemia:   Medication:   atorvastatin (Lipitor), fish oil 3000 mg daily  Low Fat, Low Choleterol Diet:  yes - is some degree  Myalgias or GI upset: no  The patient exercises rarely.   Laboratory:    Lab Results   Component Value Date    CHOL 154 (L) 10/12/2018    TRIG 74 10/12/2018    HDL 76 10/12/2018    LDLCALC 63 10/12/2018    LDLDIRECT 170 (H) 2017      Lab Results   Component Value Date    ALT 17 2019    AST 16 2019       Kidney disease stage IV  Medication:   He is not presently on any medications for this. He was on multiple medications for this in the past  Symptoms: producing urine well, no foam.  Most recent BUN/creatinine were 47 and 2.4 respectively with a GFR of 27 as of 4/17/2019  He is following with nephrology in [address] [place] [address] BPH:  Medication:   Flomax 0.4 mg daily   Saw palmetto   Symptoms: relatively controlled      Osteoarthritis  He is taking tylenol for this. He is not on any NSAIDs  He is drinking elderberry juice and pomegranate juice  He notes that his pain is controlled well enough. height is 6' 0.5\" (1.842 m) and weight is 166 lb (75.3 kg). His temporal temperature is 98.3 °F (36.8 °C). His blood pressure is 124/72 and his pulse is 56. His oxygen saturation is 99%. Body mass index is 22.2 kg/m².     I have reviewed the following with the Mr. Brandy Kulkarni   Lab Review  Orders Only on 04/17/2019   Component Date Value    Magnesium 04/17/2019 2.2    Orders Only on 04/17/2019   Component Date Value    Phosphorus 04/17/2019 3.4    Orders Only on 04/17/2019   Component Date Value    Sodium 04/17/2019 142     Potassium 04/17/2019 4.9     Chloride 04/17/2019 106     CO2 04/17/2019 21*    Anion Gap 04/17/2019 15     Glucose 04/17/2019 113*    BUN 04/17/2019 47*    CREATININE 04/17/2019 2.4*    GFR Non- 04/17/2019 27*    Calcium 04/17/2019 9.5     Total Protein 04/17/2019 7.6     Alb 04/17/2019 4.5     Total Bilirubin 04/17/2019 <0.2     Alkaline Phosphatase 04/17/2019 89     ALT 04/17/2019 17     AST 04/17/2019 16    Orders Only on 04/17/2019   Component Date Value    WBC 04/17/2019 9.6     RBC 04/17/2019 4.16*    Hemoglobin 04/17/2019 13.1*    Hematocrit 04/17/2019 41.5*    MCV 04/17/2019 99.8*    MCH 04/17/2019 31.5*    MCHC 04/17/2019 31.6*    RDW 04/17/2019 13.3     Platelets 26/26/3370 281     MPV 04/17/2019 11.7     Neutrophils % 04/17/2019 53.8     Lymphocytes % 04/17/2019 32.6  Monocytes % 04/17/2019 8.6     Eosinophils % 04/17/2019 3.9     Basophils % 04/17/2019 0.4     Neutrophils # 04/17/2019 5.2     Lymphocytes # 04/17/2019 3.1     Monocytes # 04/17/2019 0.80     Eosinophils # 04/17/2019 0.40     Basophils # 04/17/2019 0.00    Orders Only on 03/28/2019   Component Date Value    Magnesium 03/28/2019 2.3    Orders Only on 03/28/2019   Component Date Value    Phosphorus 03/28/2019 4.6*   Orders Only on 03/28/2019   Component Date Value    Sodium 03/28/2019 142     Potassium 03/28/2019 5.0     Chloride 03/28/2019 111     CO2 03/28/2019 20*    Anion Gap 03/28/2019 11     Glucose 03/28/2019 115*    BUN 03/28/2019 46*    CREATININE 03/28/2019 2.5*    GFR Non- 03/28/2019 26*    Calcium 03/28/2019 9.1     Total Protein 03/28/2019 6.9     Alb 03/28/2019 4.2     Total Bilirubin 03/28/2019 <0.2     Alkaline Phosphatase 03/28/2019 91     ALT 03/28/2019 23     AST 03/28/2019 18    Orders Only on 03/28/2019   Component Date Value    WBC 03/28/2019 9.0     RBC 03/28/2019 3.97*    Hemoglobin 03/28/2019 12.6*    Hematocrit 03/28/2019 41.1*    MCV 03/28/2019 103.5*    MCH 03/28/2019 31.7*    MCHC 03/28/2019 30.7*    RDW 03/28/2019 14.4     Platelets 28/15/7463 231     MPV 03/28/2019 12.4     Neutrophils % 03/28/2019 49.1*    Lymphocytes % 03/28/2019 35.9     Monocytes % 03/28/2019 9.0     Eosinophils % 03/28/2019 5.1*    Basophils % 03/28/2019 0.6     Neutrophils # 03/28/2019 4.4     Lymphocytes # 03/28/2019 3.2     Monocytes # 03/28/2019 0.80     Eosinophils # 03/28/2019 0.50     Basophils # 03/28/2019 0.10    Orders Only on 02/18/2019   Component Date Value    CREATININE 02/18/2019 2.2     24hr Urine Volume (ml) 02/18/2019 2650     Creatinine, 24H Ur 02/18/2019 1.2     Time (Hours) 02/18/2019 24     Creatinine Clearance 02/18/2019 33*   Orders Only on 02/18/2019   Component Date Value    Phosphorus 02/18/2019 3.8    There may be more visits with results that are not included. Copies of these are in the chart. Current Outpatient Medications   Medication Sig Dispense Refill    Melatonin 10 MG TABS Take 1 tablet by mouth nightly as needed      Omega-3 Fatty Acids (FISH OIL) 1000 MG CAPS Take 3,000 mg by mouth daily      acetaminophen (TYLENOL) 325 MG tablet Take 650 mg by mouth every 6 hours as needed for Pain      tamsulosin (FLOMAX) 0.4 MG capsule TAKE 1 CAPSULE BY MOUTH EVERY DAY 30 capsule 11    atorvastatin (LIPITOR) 20 MG tablet Take 1 tablet by mouth daily 30 tablet 11    amiodarone (CORDARONE) 200 MG tablet Take 1 tablet by mouth daily 30 tablet 5    tiZANidine (ZANAFLEX) 4 MG tablet Take 1 tablet by mouth every 8 hours as needed (muscle spasm) 30 tablet 5    aspirin 81 MG EC tablet Take 1 tablet by mouth daily 30 tablet 0    Multiple Vitamins-Minerals (MULTIVITAMIN ADULTS 50+) TABS Take 1 tablet by mouth daily        No current facility-administered medications for this visit. Allergies: Patient has no known allergies. Past Medical History:   Diagnosis Date    [facility]     Arthritis     BPH (benign prostatic hyperplasia)     CAD (coronary artery disease)     Chronic back pain     Diabetes ([facility]     no meds at present.     Erectile dysfunction     GERD (gastroesophageal reflux disease)     Hemodialysis patient ([facility]     [facility] History of blood transfusion     Hyperlipidemia     Hypertension     Osteoarthritis     Palliative care encounter 06/25/2018       Family History   Problem Relation Age of Onset    Other Mother     Cancer Father     Colon Cancer Neg Hx     Colon Polyps Neg Hx     Esophageal Cancer Neg Hx     Liver Cancer Neg Hx     Liver Disease Neg Hx     Rectal Cancer Neg Hx     Stomach Cancer Neg Hx        Past Surgical History:   Procedure Laterality Date    ABDOMEN SURGERY      APPENDECTOMY      BACK SURGERY      COLONOSCOPY      HC DIALYSIS CATHETER 2018    CATHETER INSERTION HEMODIALYSIS performed by Dinora Wayne MD at [address] [facility] Left     JOINT REPLACEMENT      MASTOID SURGERY Left     NECK SURGERY      IL AMPUTATION FOOT,TRANSMETATARSAL Left 2018    TRANSMETATARSAL [facility], SKIN, SUB-Q TISSUE,MUSCLE,=<20 SQ CM Left 2018    EXCISIONAL DEBRIDEMENT OF SKIN AND SUBCUTANEOUS TISSUE OFTRANSMETATARSAL AMPUTATION AND DRAINAGE OF ABCESS WOUND AND [address] [facility] performed by Yanelis Chu MD at [address] N/A 2018    LAPAROTOMY EXPLORATORY performed by Capri Lieberman MD at [facility] 11 ANEURYSM/GRFT INS,ABD AORT W/VISCER N/A 2018    OPEN REPAIR OF ABDOMINAL AORTIC ANEURYSM REPAIR WITH [id number] BYPASS WITH CELL SAVER performed by Dinora Wayne MD at [facility] FLX DX W/COLLJ SPEC BR/WA IF PFRMD N/A 2018    Dr Carpio-Likely ischemic colitis, internal hemorrhoids    TONSILLECTOMY      VASCULAR SURGERY  2018    DJM. AAA/lliac aneurysm repair, R fem EA/bypass    VASCULAR SURGERY  2018    DJM. Repair of abdominal aortic aneurysm and bilateral iliac aneurysm with aorta left iliac on the right and right femoral on the left with right common femoral endarterectomy with 18x9 gore-juliocesar bifurcated graft. Social History     Tobacco Use    Smoking status: Former Smoker     Packs/day: 0.00     Years: 50.00     Pack years: 0.00     Types: Cigarettes     Last attempt to quit: 2018     Years since quittin.9    Smokeless tobacco: Never Used    Tobacco comment: pt has not smoked since surgery   Substance Use Topics    Alcohol use: Yes     Comment: 1 beer per year        Review of Systems   Constitutional: Negative for appetite change, chills, diaphoresis, fatigue, fever and unexpected weight change.    HENT: Negative for congestion, dental problem (following with dentist regularly), ear pain, hearing loss, postnasal drip, rhinorrhea, sinus pressure, sore throat, tinnitus, trouble swallowing and voice change. Eyes: Negative for visual disturbance. Respiratory: Negative for cough, chest tightness, shortness of breath and wheezing. No orthopnea   Cardiovascular: Negative for chest pain, palpitations and leg swelling. Gastrointestinal: Positive for constipation (he takes otc maalox). Negative for abdominal pain, diarrhea, nausea and vomiting. No melena or hematochezia   Endocrine: Negative for cold intolerance, heat intolerance, polydipsia and polyuria. Genitourinary: Negative for difficulty urinating, dysuria and enuresis. Musculoskeletal: Positive for arthralgias (bilateral hips and his left foot ), back pain (low, midline) and neck pain. Negative for joint swelling and myalgias. Skin: Negative for rash. Allergic/Immunologic: Negative. Neurological: Negative for dizziness, tremors, syncope (or presyncope), weakness, light-headedness and headaches. Hematological: Negative for adenopathy. Does not bruise/bleed easily. Psychiatric/Behavioral: Negative. The patient is not nervous/anxious. Physical Exam   Constitutional: He is oriented to person, place, and time. He appears well-developed and well-nourished. No distress. HENT:   Head: Normocephalic and atraumatic. Right Ear: External ear normal.   Left Ear: External ear normal.   Nose: Nose normal.   Mouth/Throat: Oropharynx is clear and moist.   Eyes: Pupils are equal, round, and reactive to light. Conjunctivae and EOM are normal. No scleral icterus. Neck: Normal range of motion. Neck supple. No JVD present. Carotid bruit is not present. No thyromegaly present. Cardiovascular: Normal rate, regular rhythm, S1 normal, S2 normal and normal heart sounds. No extrasystoles are present. PMI is not displaced. Exam reveals no gallop and no friction rub. No murmur heard.   Pulmonary/Chest: Effort normal and breath sounds normal. No respiratory distress. He has no wheezes. He has no rhonchi. He has no rales. Pectus excavatum   Abdominal: Soft. Bowel sounds are normal. There is no hepatosplenomegaly. There is no tenderness. There is no rebound, no guarding and no CVA tenderness. Genitourinary:   Genitourinary Comments: Exam deferred   Musculoskeletal: Normal range of motion. He exhibits no edema or tenderness. Anterior location of shoulders with curvature of spine. There is also some increased kyphosis of Tspine and levoscoliosis. He has partial amputation of his Left forefoot. Lymphadenopathy:     He has no cervical adenopathy. Neurological: He is alert and oriented to person, place, and time. He has normal strength. No sensory deficit (no numbness or tingling). Skin: Skin is warm and dry. No rash noted. Healing ulcer on Left buttock. Psychiatric: He has a normal mood and affect. ASSESSMENT      ICD-10-CM    1. Visit for preventive health examination Z00.00 CBC Auto Differential     Comprehensive Metabolic Panel     Lipid Panel     Microalbumin / Creatinine Urine Ratio     T4, Free     TSH without Reflex     Psa screening   2. Chronic kidney disease (CKD), stage IV (severe) (HCC) N18.4 Comprehensive Metabolic Panel     Microalbumin / Creatinine Urine Ratio   3. Peripheral vascular disease (HCC) I73.9    4. Paroxysmal atrial flutter (HCC) I48.92    5. Anemia due to stage 4 chronic kidney disease (HCC) N18.4 CBC Auto Differential    D63.1    6. Benign prostatic hyperplasia with urinary frequency N40.1 Psa screening    R35.0    7. Routine general medical examination at a health care facility Z00.00    8. Primary osteoarthritis involving multiple joints M15.0    9. Mixed hyperlipidemia E78.2 Lipid Panel   10. Encounter for screening for malignant neoplasm of prostate Z12.5 Psa screening   11. Fatigue, unspecified type R53.83 T4, Free     TSH without Reflex       PLAN      ICD-10-CM    1.  Visit for preventive health examination Z00.00 CBC Auto Differential     Comprehensive Metabolic Panel     Lipid Panel     Microalbumin / Creatinine Urine Ratio     T4, Free     TSH without Reflex     Psa screening   2. Chronic kidney disease (CKD), stage IV (severe) (HCC) N18.4 Comprehensive Metabolic Panel  He is now off dialysis and being followed by nephrology. Is not yet on optimal therapy to minimize progression of renal disease. We will work with his nephrologist to maximize his renal conservation therapy      Microalbumin / Creatinine Urine Ratio   3. Peripheral vascular disease (HCC) I73.9 He is on appropriate therapy   4. Paroxysmal atrial flutter (HCC) I48.92 Not on anticoagulation   5. Anemia due to stage 4 chronic kidney disease (HCC) N18.4 CBC Auto Differential    D63.1    6. Benign prostatic hyperplasia with urinary frequency N40.1 Psa screening    R35.0    7. Routine general medical examination at a health care facility Z00.00 Routine age-appropriate anticipatory guidance was provided. Annual wellness visit was performed in a separate note and issues were addressed as identified. 8. Primary osteoarthritis involving multiple joints M15.0 Doing well   Discussed not take any nsaids. 9. Mixed hyperlipidemia E78.2 Lipid Panel   10. Encounter for screening for malignant neoplasm of prostate Z12.5 Psa screening   11. Fatigue, unspecified type R53.83 T4, Free     TSH without Reflex       Orders Placed This Encounter   Procedures    CBC Auto Differential    Comprehensive Metabolic Panel    Lipid Panel    Microalbumin / Creatinine Urine Ratio    T4, Free    TSH without Reflex    Psa screening        Return in about 3 months (around 2019) for Medicare Annual Wellness Visit in 1 year, 30. Medicare Annual Wellness Visit  Name: Lord Munoz Date: 2019   MRN: [id number] Sex: Male   Age: 72 y.o.  Ethnicity: Non-/Non    : 1945 Race: Aamir Del Castillo 10 MG TABS Take 1 tablet by mouth nightly as needed Yes Historical Provider, MD   Omega-3 Fatty Acids (FISH OIL) 1000 MG CAPS Take 3,000 mg by mouth daily Yes Historical Provider, MD   acetaminophen (TYLENOL) 325 MG tablet Take 650 mg by mouth every 6 hours as needed for Pain Yes Historical Provider, MD   tamsulosin (FLOMAX) 0.4 MG capsule TAKE 1 CAPSULE BY MOUTH EVERY DAY Yes MATTHEW Avalos DO   atorvastatin (LIPITOR) 20 MG tablet Take 1 tablet by mouth daily Yes MATTHEW Avalos DO   amiodarone (CORDARONE) 200 MG tablet Take 1 tablet by mouth daily Yes MATTHEW Avalos DO   tiZANidine (ZANAFLEX) 4 MG tablet Take 1 tablet by mouth every 8 hours as needed (muscle spasm) Yes MATTHEW Avalos DO   aspirin 81 MG EC tablet Take 1 tablet by mouth daily Yes Stanton Gilbert MD   Multiple Vitamins-Minerals (MULTIVITAMIN ADULTS 50+) TABS Take 1 tablet by mouth daily  Yes Historical Provider, MD      Diagnosis Date    [facility]     Arthritis     BPH (benign prostatic hyperplasia)     CAD (coronary artery disease)     Chronic back pain     Diabetes ([facility]     no meds at present.     Erectile dysfunction     GERD (gastroesophageal reflux disease)     Hemod[facility] patient ([facility]     [facility] History of blood transfusion     Hyperlipidemia     Hypertension     Osteoarthritis     Palliative care encounter 06/25/2018     Past Surgical History:   Procedure Laterality Date    ABDOMEN SURGERY      APPENDECTOMY      BACK SURGERY      COLONOSCOPY      HC DIALYSIS CATHETER  6/12/2018    CATHETER INSERTION HEMODIALYSIS performed by Dinora Wayne MD at [address] [facility] Left     JOINT REPLACEMENT      MASTOID SURGERY Left     NECK SURGERY      OR AMPUTATION FOOT,TRANSMETATARSAL Left 8/13/2018    TRANSMETATARSAL AMPUTATION performed by Dinora Wayne MD at [facility], SKIN, SUB-Q TISSUE,MUSCLE,=<20 SQ CM Left 9/7/2018    EXCISIONAL DEBRIDEMENT OF SKIN AND SUBCUTANEOUS TISSUE OFTRANSMETATARSAL AMPUTATION AND DRAINAGE OF ABCESS WOUND AND Perry Calvillo performed by Mitchell Pratt MD at [address] N/A 6/12/2018    LAPAROTOMY EXPLORATORY performed by Alexis Lucas MD at [facility] 11 ANEURYSM/GRFT INS,ABD AORT W/VISCER N/A 6/11/2018    OPEN REPAIR OF ABDOMINAL AORTIC ANEURYSM REPAIR WITH [id number] BYPASS WITH CELL SAVER performed by Marlon Alarcon MD at [address] [facility]"" FLX DX W/COLLJ SPEC BR/WA IF PFRMD N/A 6/12/2018    Dr Carpio-Likely ischemic colitis, internal hemorrhoids    TONSILLECTOMY      VASCULAR SURGERY  06/11/2018    DJM. AAA/lliac aneurysm repair, R fem EA/bypass    VASCULAR SURGERY  06/11/2018    DJM. Repair of abdominal aortic aneurysm and bilateral iliac aneurysm with aorta left iliac on the right and right femoral on the left with right common femoral endarterectomy with 18x9 gore-juliocesar bifurcated graft. Family History   Problem Relation Age of Onset    Other Mother     Cancer Father     Colon Cancer Neg Hx     Colon Polyps Neg Hx     Esophageal Cancer Neg Hx     Liver Cancer Neg Hx     Liver Disease Neg Hx     Rectal Cancer Neg Hx     Stomach Cancer Neg Hx        CareTeam (Including outside providers/suppliers regularly involved in providing care):   Patient Care Team:  [address], DO as PCP - General (Pediatrics)  [address] [facility],[address], DO as PCP - [facility]  JESSICA Pennington MD as Consulting Physician (Cardiology)    Wt Readings from Last 3 Encounters:   06/04/19 166 lb (75.3 kg)   12/04/18 158 lb 12 oz (72 kg)   10/10/18 158 lb (71.7 kg)     Vitals:    06/04/19 0840   BP: 124/72   Site: Left Upper Arm   Position: Sitting   Cuff Size: Large Adult   Pulse: 56   Temp: 98.3 °F (36.8 °C)   TempSrc: Temporal   SpO2: 99%   Weight: 166 lb (75.3 kg)   Height: 6' 0.5\" (1.842 m)     Body mass index is 22.2 kg/m².     Based upon direct observation of the patient, evaluation of cognition reveals recent and remote memory intact. Physical exam is documented elsewhere in a separate note    Patient's complete Health Risk Assessment and screening values have been reviewed and are found in Flowsheets. The following problems were reviewed today and where indicated follow up appointments were made and/or referrals ordered. Positive Risk Factor Screenings with Interventions:     Health Habits/Nutrition:  Health Habits/Nutrition  Do you exercise for at least 20 minutes 2-3 times per week?: (!) No  Have you lost any weight without trying in the past 3 months?: No  Do you eat fewer than 2 meals per day?: No  Have you seen a dentist within the past year?: (!) No  Body mass index is 22.2 kg/m².   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 child)  Do you have difficulty driving, watching TV, or doing any of your daily activities because of your eyesight?: No  Have you had an eye exam within the past year?: (!) No  Hearing/Vision Interventions:  · Hearing concerns:  Information provided and audiology referral offered  · Vision concerns:  patient encouraged to make appointment with his/her eye specialist    Personalized Preventive Plan   Current Health Maintenance Status  Immunization History   Administered Date(s) Administered    Influenza, High Dose (Fluzone 65 yrs and older) 11/13/2018        Health Maintenance   Topic Date Due    Diabetic retinal exam  07/15/1957    Hepatitis B Vaccine (1 of 3 - Risk Recombivax 3-dose series) 07/15/1966    DTaP/Tdap/Td vaccine (1 - Tdap) 07/15/1966    Shingles Vaccine (1 of 2) 07/15/1997    Pneumococcal 65+ years Vaccine (1 of 2 -

## 2019-06-04 NOTE — PATIENT INSTRUCTIONS
Personalized Preventive Plan for [place] - 6/4/2019  Medicare offers a range of preventive health benefits. Some of the tests and screenings are paid in full while other may be subject to a deductible, co-insurance, and/or copay. Some of these benefits include a comprehensive review of your medical history including lifestyle, illnesses that may run in your family, and various assessments and screenings as appropriate. After reviewing your medical record and screening and assessments performed today your provider may have ordered immunizations, labs, imaging, and/or referrals for you. A list of these orders (if applicable) as well as your Preventive Care list are included within your After Visit Summary for your review. Other Preventive Recommendations:    · A preventive eye exam performed by an eye specialist is recommended every 1-2 years to screen for glaucoma; cataracts, macular degeneration, and other eye disorders. · A preventive dental visit is recommended every 6 months. · Try to get at least 150 minutes of exercise per week or 10,000 steps per day on a pedometer . · Order or download the FREE \"Exercise & Physical Activity: Your Everyday Guide\" from [employer]. Call 2-559.449.9100 or search [employer] online. · You need 8461-0736 mg of calcium and 8318-0828 IU of vitamin D per day. It is possible to meet your calcium requirement with diet alone, but a vitamin D supplement is usually necessary to meet this goal.  · When exposed to the sun, use a sunscreen that protects against both UVA and UVB radiation with an SPF of 30 or greater. Reapply every 2 to 3 hours or after sweating, drying off with a towel, or swimming. · Always wear a seat belt when traveling in a car. Always wear a helmet when riding a bicycle or motorcycle.   Patient Education        Well Visit, Over 72: Care Instructions  Your Care Instructions    Physical exams can help you routine doctor visit. Your doctor will tell you how often to check your blood pressure based on your age, your blood pressure results, and other factors. Diabetes. Ask your doctor whether you should have tests for diabetes. Vision. Experts recommend that you have yearly exams for glaucoma and other age-related eye problems. Hearing. Tell your doctor if you notice any change in your hearing. You can have tests to find out how well you hear. Colon cancer tests. Keep having colon cancer tests as your doctor recommends. You can have one of several types of tests. Heart attack and stroke risk. At least every 4 to 6 years, you should have your risk for heart attack and stroke assessed. Your doctor uses factors such as your age, blood pressure, cholesterol, and whether you smoke or have diabetes to show what your risk for a heart attack or stroke is over the next 10 years. Osteoporosis. Talk to your doctor about whether you should have a bone density test to find out whether you have thinning bones. Also ask your doctor about whether you should take calcium and vitamin D supplements. For women  Pap test and pelvic exam. You may no longer need a Pap test. Talk with your doctor about whether to stop or continue to have Pap tests. Breast exam and mammogram. Ask how often you should have a mammogram, which is an X-ray of your breasts. A mammogram can spot breast cancer before it can be felt and when it is easiest to treat. Thyroid disease. Talk to your doctor about whether to have your thyroid checked as part of a regular physical exam. Women have an increased chance of a thyroid problem. For men  Prostate exam. Talk to your doctor about whether you should have a blood test (called a PSA test) for prostate cancer.  Experts recommend that you discuss the benefits and risks of the test with your doctor before you decide whether to have this test. Some experts say that men ages 79 and older no longer need testing. Abdominal aortic aneurysm. Ask your doctor whether you should have a test to check for an aneurysm. You may need a test if you ever smoked or if your parent, brother, sister, or child has had an aneurysm. When should you call for help? Watch closely for changes in your health, and be sure to contact your doctor if you have any problems or symptoms that concern you. Where can you learn more? Go to https://[email or web address].[email or web address]. org and sign in to your Dahu account. Enter C100 in the [place] box to learn more about \"Well Visit, Over 65: Care Instructions. \"     If you do not have an account, please click on the \"Sign Up Now\" link. Current as of: December 13, 2018  Content Version: 12.0  © 4775-6794 Healthwise, FibeRio. Care instructions adapted under license by [facility] ([facility]). If you have questions about a medical condition or this instruction, always ask your healthcare professional. [place] [address] any warranty or liability for your use of this information. Patient Education        Learning About Physical Activity  What is physical activity? Physical activity is any kind of activity that gets your body moving. The types of physical activity that can help you get fit and stay healthy include:  Aerobic or \"cardio\" activities that make your heart beat faster and make you breathe harder, such as brisk walking, riding a bike, or running. Aerobic activities strengthen your heart and lungs and build up your endurance. Strength training activities that make your muscles work against, or \"resist,\" something, such as lifting weights or doing push-ups. These activities help tone and strengthen your muscles. Stretches that allow you to move your joints and muscles through their full range of motion. Stretching helps you be more flexible and avoid injury. What are the benefits of physical activity?   Being active is one of the best things you can do to home or in a gym or community center. Stretch your muscles often. Stretching will help you as you become more active. It can help you stay flexible, loosen tight muscles, and avoid injury. It can also help improve your balance and posture and can be a great way to relax. Be sure to stretch the muscles you'll be using when you work out. It's best to warm your muscles slightly before you stretch them. Walk or do some other light aerobic activity for a few minutes, and then start stretching. When you stretch your muscles:  Do it slowly. Stretching is not about going fast or making sudden movements. Don't push or bounce during a stretch. Hold each stretch for at least 15 to 30 seconds, if you can. You should feel a stretch in the muscle, but not pain. Breathe out as you do the stretch. Then breathe in as you hold the stretch. Don't hold your breath. If you're worried about how more activity might affect your health, have a checkup before you start. Follow any special advice your doctor gives you for getting a smart start. Where can you learn more? Go to https://[email or web address] SoftwarepepicewMeasy.The Echo [email or web address] org and sign in to your EventBoard account. Enter I239 in the Ooyala box to learn more about \"Learning About Physical Activity. \"     If you do not have an account, please click on the \"Sign Up Now\" link. Current as of: August 19, 2018  Content Version: 12.0  © 4352-3028 Healthwise, IDInteract. Care instructions adapted under license by [facility] ([facility]). If you have questions about a medical condition or this instruction, always ask your healthcare professional. [place] [address] any warranty or liability for your use of this information. Patient Education        Learning About Dental Care  What is basic dental care? Basic dental care involves brushing and flossing your teeth regularly to remove plaque.  Plaque is a thin film of bacteria that sticks to teeth above and below the gum line. It can build up and harden into tartar, which makes it harder to give the teeth a good cleaning. Tartar usually has to be removed by a dental hygienist.  The bacteria in plaque use sugars to make acids. These acids can damage the gums and teeth. Be sure to see your dentist and dental hygienist for regular checkups and cleanings. How can dental care affect your health? Practicing basic dental care:  Removes plaque that can cause gum disease and tooth decay. Tooth decay can lead to a hole in the tooth (cavity). Helps prevent bad breath. Brushing and flossing rid your mouth of the bacteria that cause bad breath. Helps keep teeth white by preventing staining from food, drinks, and tobacco.  Makes it possible for your teeth to last a lifetime. What can you do to prevent dental problems? Brush your teeth twice a day, in the morning and at night. Use a toothbrush with soft, rounded-end bristles and a head that is small enough to reach all parts of your teeth and mouth. Replace your toothbrush every 3 to 4 months. Use a fluoride toothpaste. Place the brush at a 45-degree angle where the teeth meet the gums. Press firmly, and gently rock the brush back and forth using small circular movements. Brush chewing surfaces vigorously with short back-and-forth strokes. Brush your tongue from back to front. Floss at least once a day. Choose the type and flavor you like best.  Schedule checkups and cleanings as often as your dentist recommends it. Eat a healthy diet to help keep your gums healthy and your teeth strong. Choose foods that are good for your teeth, such as whole grains, vegetables, fruits, and foods that are low in saturated fat and sodium. Mozzarella and other cheeses, peanuts, yogurt, and milk are good for your teeth. Sugar-free chewing gum (especially gum that contains xylitol) is also a good choice. Avoid foods that contain a lot of sugar, especially sticky, sweet foods like taffy.   Don't snack before bedtime. Food left on the teeth is more likely to cause tooth decay overnight. Don't smoke or use smokeless tobacco. Tobacco can make tooth decay worse. If you need help quitting, talk to your doctor about stop-smoking programs and medicines. These can increase your chances of quitting for good. Where can you learn more? Go to [email or web address].[email or web address] org and sign in to your MetricStream account. Enter T567 in the Cloud Nine Productions box to learn more about \"Learning About Dental Care. \"     If you do not have an account, please click on the \"Sign Up Now\" link. Current as of: October 3, 2018  Content Version: 12.0  © 8862-3201 M-Audio. Care instructions adapted under license by [facility] ([facility]). If you have questions about a medical condition or this instruction, always ask your healthcare professional. [place] [address] any warranty or liability for your use of this information. Patient Education        Learning About Dental Care for Older Adults  Dental care for older adults: Overview  Dental care for older people is much the same as for younger adults. But older adults do have concerns that younger adults do not. Older adults may have problems with gum disease and decay on the roots of their teeth. They may need missing teeth replaced or broken fillings fixed. Or they may have dentures that need to be cared for. Some older adults may have trouble holding a toothbrush. You can help remind the person you are caring for to brush and floss their teeth or to clean their dentures. In some cases, you may need to do the brushing and other dental care tasks. People who have trouble using their hands or who have dementia may need this extra help. How can you help with dental care? Normal dental care  To keep the teeth and gums healthy:  Brush the teeth with fluoride toothpaste twice a day--in the morning and at night--and floss at least once a day.  Plaque can have the person sit and face away from you, and to sit or stand behind them. That way you can steady their head against your arm as you reach around to floss and brush their teeth. Choose a place that has good lighting and is comfortable for both of you. Before you begin, gather your supplies. You will need gloves, floss, a toothbrush, and a container to hold water if you are not near a sink. Wash and dry your hands well and put on gloves. Start by flossing:  Gently work a piece of floss between each of the teeth toward the gums. A plastic flossing tool may make this easier, and they are available at most [facility]. Curve the floss around each tooth into a U-shape and gently slide it under the gum line. Move the floss firmly up and down several times to scrape off the plaque. After you've finished flossing, throw away the used floss and begin brushing:  Wet the brush and apply toothpaste. Place the brush at a 45-degree angle where the teeth meet the gums. Press firmly, and move the brush in small circles over the surface of the teeth. Be careful not to brush too hard. Vigorous brushing can make the gums pull away from the teeth and can scratch the tooth enamel. Brush all surfaces of the teeth, on the tongue side and on the cheek side. Pay special attention to the front teeth and all surfaces of the back teeth. Brush chewing surfaces with short back-and-forth strokes. After you've finished, help the person rinse the remaining toothpaste from their mouth. Where can you learn more? Go to https://isabel.Storitz. org and sign in to your Seesearch account. Enter X717 in the [facility] box to learn more about \"Learning About Dental Care for Older Adults. \"     If you do not have an account, please click on the \"Sign Up Now\" link. Current as of: April 18, 2018  Content Version: 12.0  © 0062-2791 Healthwise, Incorporated. Care instructions adapted under license by [facility] ([facility]).  If you of talking or listening. These devices are also called TDD. When messages are typed on the keyboard, they are sent over the phone line to a receiving TTY. The message is shown on a monitor. Use pagers, fax machines, and email if it is hard for you to communicate by telephone. Try to learn a listening technique called speech-reading. It is not lip-reading. You pay attention to people's gestures, expressions, posture, and tone of voice. These clues can help you understand what a person is saying. Face the person you are talking to, and have him or her face you. Make sure the lighting is good. You need to see the other person's face clearly. Think about counseling if you need help to adjust to your hearing loss. When should you call for help? Watch closely for changes in your health, and be sure to contact your doctor if:    You think your hearing is getting worse.     You have new symptoms, such as dizziness or nausea. Where can you learn more? Go to https://[email or web address] Society.R&T Enterprises. org and sign in to your FAZUA account. Enter L513 in the Vnomics box to learn more about \"Hearing Loss: Care Instructions. \"     If you do not have an account, please click on the \"Sign Up Now\" link. Current as of: October 21, 2018  Content Version: 12.0  © 4484-8374 Healthwise, Incorporated. Care instructions adapted under license by [facility] [address] If you have questions about a medical condition or this instruction, always ask your healthcare professional. [place] [address] any warranty or liability for your use of this information. Patient Education        Learning About Hearing Aids  What is a hearing aid? A hearing aid makes sounds louder. It can help some people with hearing problems to hear better. Hearing aids do not restore normal hearing. But they can make it easier to communicate. There are different types of hearing aids.   Analog adjustable hearing aids make both speech and other sounds louder in the same amount. Your doctor can adjust them to fit your hearing. You can control loudness. These cost less than the other types of hearing aids. Analog programmable hearing aids have a computer chip that your doctor can program to fit your hearing. They can be set up for different places or events. For example, you can have a setting for quiet one-on-one conversations and another for noisy times like a dinner party in a restaurant. You can change hearing programs with a remote control. Digital programmable hearing aids can adjust themselves to work best where you are at any time. You also have more choices in setting them up than with analog hearing aids. Bone-anchored hearing systems transmit sound through the skull. This type of hearing aid is permanently implanted in the skull bone. It may work for people who do not benefit from other types of hearing aids. There are also different styles of hearing aids. A behind-the-ear (BTE) hearing aid connects to a plastic ear mold that fits inside the outer ear. BTE hearing aids are used for all levels of hearing loss, especially very severe hearing loss. They may be better for children for safety and growth reasons. Poorly fitting BTE ear molds or a buildup of earwax may cause a whistling sound (feedback). An in-the-ear (ITE) hearing aid fits in the outer part of the ear. It can be used by people with mild to severe hearing loss. ITE hearing aids can be used with other hearing devices, such as a telecoil that improves hearing during phone calls. ITE hearing aids can be damaged by earwax and fluid draining from the ear. Their small size may be hard for some people to handle. They are not often used in children because the case must be replaced as the child grows. An in-the-canal (ITC) hearing aid fits into the ear canal. ITC hearing aids are used by people with mild to moderate hearing loss.  They are made to fit the shape and the size of Patient Education        Hearing Tests: About These Tests  What are they? Hearing tests check how well you can hear. There are many types of hearing tests. If your doctor thinks that you might have hearing loss, he or she may refer you to a hearing specialist (audiologist) to do hearing tests. Why are these tests done? You may have hearing tests because you think you have hearing loss or you have ringing in your ears. Your doctor might want to find the type of hearing loss you have and see how bad it is. How can you prepare for these tests? Avoid loud noises for 16 hours before these tests. What happens before these tests? Tell your doctor if:  You have recently been exposed to any painfully loud noise or to a noise that made your ears ring. You are often exposed to loud noises. You are taking or have taken antibiotics that can damage hearing, such as gentamicin. You have had any problems hearing normal conversations or noticed any other signs of possible hearing loss. You have recently had a cold or ear infection. You have family members who have hearing loss. Before beginning any hearing tests, your doctor may check your ear canals for earwax and remove any hardened wax. The wax can interfere with how well you hear the tones or words used during testing. Some tests require headphones. For these tests, you will need to remove eyeglasses, earrings, or hair clips that may get in the way of the headphones. You may have a thin plastic tube placed in your ear canal to keep it open. The headphones are then placed on your head and adjusted to fit. If you are wearing a hearing aid, your doctor may ask you to remove it for some of the tests. What happens during these tests? Tuning fork tests  Tuning fork tests check how well sound moves through your ear. Your doctor strikes the tuning fork to make it vibrate and produce a tone. Sometimes the tuning fork will be placed on your head or behind your ear. Depending on how you hear the sound, your doctor can tell if there is a problem with the nerves or with sound getting to the nerves. Pure tone audiometry  Pure tone audiometry checks how well you hear. A machine called an audiometer plays a series of tones through headphones. The tones change in pitch and loudness. Your doctor will reduce the loudness of a tone until you can no longer hear it. Then the tone will get louder until you can hear it again. If you can hear the tone, you signal by raising your hand or pressing a button. The doctor will repeat the test using a higher-pitched tone each time. Each ear is tested separately. The headphones will then be removed. A special vibrating device will be placed on the bone behind your ear. Again, you will signal each time you hear a tone. Speech reception and word recognition tests  These tests measure how well you hear and understand normal conversation. In these tests, you hear a series of simple words spoken with different degrees of loudness. You are asked to repeat the words. Your doctor measures the level at which you can no longer hear the words well enough to repeat them. Auditory brain stem response (ABR) testing  Auditory brain stem response (ABR) testing detects hearing loss caused by a problem in the inner ear, in the nerve that allows you to hear, or in the brain. In this test, electrodes are placed on your scalp and on each earlobe. Clicking noises are then sent through earphones. The electrodes monitor your brain's response to the clicking noises and record the response on a graph. How long do the tests take? The tests usually take about 1 hour. What happens after these tests? You will probably be able to go home right away. You can go back to your usual activities right away. When should you call for help? Watch closely for changes in your health, and be sure to contact your doctor if you have any problems.   Follow-up care is a key part of your treatment and safety. Be sure to make and go to all appointments, and call your doctor if you are having problems. It's also a good idea to keep a list of the medicines you take. Ask your doctor when you can expect to have your test results. Where can you learn more? Go to https://[email or web address] and sign in to your Techpool Bio-Pharmahart account. Enter M402 in the Zola box to learn more about \"Hearing Tests: About These Tests. \"     If you do not have an account, please click on the \"Sign Up Now\" link. Current as of: October 21, 2018  Content Version: 12.0  © 8547-1068 Healthwise, "[employer], [place]". Care instructions adapted under license by [facility] ([facility]). If you have questions about a medical condition or this instruction, always ask your healthcare professional. [address] any warranty or liability for your use of this information. Patient Education        Learning About Vision Tests  What are vision tests? The four most common vision tests are visual acuity tests, refraction, visual field tests, and color vision tests. Visual acuity (sharpness) tests are used: To see if you need glasses or contact lenses. To monitor an eye problem. To check an eye injury. Visual acuity tests are done as part of routine exams. You may also have this test when you get your 's license or apply for some types of jobs. Refraction is done: To find the right prescription for glasses and contact lenses. Visual field tests are used: To check for vision loss in any area of your range of vision. To screen for certain eye diseases. To look for nerve damage after a stroke, head injury, or other problem that could reduce blood flow to the brain. Color vision tests are used: To check for color blindness.   Color vision is often tested as part of a routine exam. You may also have this test when you apply for a job where recognizing different colors is important, such as , [employer], or the [employer]. How are vision tests done? Visual acuity test  You cover one eye at a time. You read aloud from a chart across the room. You read aloud from a small card that you hold in your hand. Refraction  You look into a special device. The device puts lenses of different strengths in front of each eye to see how strong your glasses or contact lenses need to be. Visual field tests  Your doctor may have you look through special machines. Or your doctor may simply have you stare straight ahead while he or she moves a finger into and out of your field of vision. Color vision test  You look at pieces of printed test patterns in various colors. You say what number or symbol you see. Your doctor may have you trace the number or symbol using a pointer. How do these tests feel? You shouldn't feel any discomfort during these tests. Follow-up care is a key part of your treatment and safety. Be sure to make and go to all appointments, and call your doctor if you are having problems. It's also a good idea to know your test results and keep a list of the medicines you take. Where can you learn more? Go to https://[employer]. org and sign in to your [employer] account. Enter G551 in the Fotofeedback box to learn more about \"Learning About Vision Tests. \"     If you do not have an account, please click on the \"Sign Up Now\" link. Current as of: July 17, 2018  Content Version: 12.0  © 0875-8547 [employer], [employer]orporated. Care instructions adapted under license by [facility] ([facility]). If you have questions about a medical condition or this instruction, always ask your healthcare professional. [place] [address] any warranty or liability for your use of this information.

## 2019-06-04 NOTE — TELEPHONE ENCOUNTER
Sophie called with [facility] stating that when she called pt with an appt for Monday he said he would have to decline since it was in [place]. So he will not be seeing Wound Care.

## 2019-06-05 ENCOUNTER — TELEPHONE (OUTPATIENT)
Dept: PRIMARY CARE CLINIC | Age: 74
End: 2019-06-05

## 2019-06-05 DIAGNOSIS — Z12.5 ENCOUNTER FOR SCREENING FOR MALIGNANT NEOPLASM OF PROSTATE: ICD-10-CM

## 2019-06-05 DIAGNOSIS — N40.1 BENIGN PROSTATIC HYPERPLASIA WITH URINARY FREQUENCY: ICD-10-CM

## 2019-06-05 DIAGNOSIS — Z00.00 VISIT FOR PREVENTIVE HEALTH EXAMINATION: ICD-10-CM

## 2019-06-05 DIAGNOSIS — D63.1 ANEMIA DUE TO STAGE 4 CHRONIC KIDNEY DISEASE (HCC): ICD-10-CM

## 2019-06-05 DIAGNOSIS — E78.2 MIXED HYPERLIPIDEMIA: ICD-10-CM

## 2019-06-05 DIAGNOSIS — E87.5 SERUM POTASSIUM ELEVATED: Primary | ICD-10-CM

## 2019-06-05 DIAGNOSIS — R53.83 FATIGUE, UNSPECIFIED TYPE: ICD-10-CM

## 2019-06-05 DIAGNOSIS — R35.0 BENIGN PROSTATIC HYPERPLASIA WITH URINARY FREQUENCY: ICD-10-CM

## 2019-06-05 DIAGNOSIS — N18.4 ANEMIA DUE TO STAGE 4 CHRONIC KIDNEY DISEASE (HCC): ICD-10-CM

## 2019-06-05 DIAGNOSIS — R71.8 RBC ABNORMALITY: ICD-10-CM

## 2019-06-05 DIAGNOSIS — N18.4 CHRONIC KIDNEY DISEASE (CKD), STAGE IV (SEVERE) (HCC): ICD-10-CM

## 2019-06-05 LAB
ALBUMIN SERPL-MCNC: 4.4 G/DL (ref 3.5–5.2)
ALP BLD-CCNC: 82 U/L (ref 40–130)
ALT SERPL-CCNC: 18 U/L (ref 5–41)
ANION GAP SERPL CALCULATED.3IONS-SCNC: 12 MMOL/L (ref 7–19)
AST SERPL-CCNC: 16 U/L (ref 5–40)
BASOPHILS ABSOLUTE: 0 K/UL (ref 0–0.2)
BASOPHILS RELATIVE PERCENT: 0.4 % (ref 0–1)
BILIRUB SERPL-MCNC: 0.4 MG/DL (ref 0.2–1.2)
BUN BLDV-MCNC: 31 MG/DL (ref 8–23)
CALCIUM SERPL-MCNC: 8.9 MG/DL (ref 8.8–10.2)
CHLORIDE BLD-SCNC: 108 MMOL/L (ref 98–111)
CHOLESTEROL, TOTAL: 142 MG/DL (ref 160–199)
CO2: 22 MMOL/L (ref 22–29)
CREAT SERPL-MCNC: 2.1 MG/DL (ref 0.5–1.2)
CREATININE URINE: 91.6 MG/DL (ref 4.2–622)
EOSINOPHILS ABSOLUTE: 0.4 K/UL (ref 0–0.6)
EOSINOPHILS RELATIVE PERCENT: 3.9 % (ref 0–5)
GFR NON-AFRICAN AMERICAN: 31
GLUCOSE BLD-MCNC: 107 MG/DL (ref 74–109)
HCT VFR BLD CALC: 40.7 % (ref 42–52)
HDLC SERPL-MCNC: 48 MG/DL (ref 55–121)
HEMOGLOBIN: 12.9 G/DL (ref 14–18)
LDL CHOLESTEROL CALCULATED: 71 MG/DL
LYMPHOCYTES ABSOLUTE: 3 K/UL (ref 1.1–4.5)
LYMPHOCYTES RELATIVE PERCENT: 33.3 % (ref 20–40)
MCH RBC QN AUTO: 32.3 PG (ref 27–31)
MCHC RBC AUTO-ENTMCNC: 31.7 G/DL (ref 33–37)
MCV RBC AUTO: 101.8 FL (ref 80–94)
MICROALBUMIN UR-MCNC: 4.4 MG/DL (ref 0–19)
MICROALBUMIN/CREAT UR-RTO: 48 MG/G
MONOCYTES ABSOLUTE: 0.7 K/UL (ref 0–0.9)
MONOCYTES RELATIVE PERCENT: 7.3 % (ref 0–10)
NEUTROPHILS ABSOLUTE: 4.9 K/UL (ref 1.5–7.5)
NEUTROPHILS RELATIVE PERCENT: 54.7 % (ref 50–65)
PDW BLD-RTO: 13.9 % (ref 11.5–14.5)
PLATELET # BLD: 208 K/UL (ref 130–400)
PMV BLD AUTO: 12.5 FL (ref 9.4–12.4)
POTASSIUM SERPL-SCNC: 5.3 MMOL/L (ref 3.5–5)
PROSTATE SPECIFIC ANTIGEN: 5.19 NG/ML (ref 0–4)
RBC # BLD: 4 M/UL (ref 4.7–6.1)
SODIUM BLD-SCNC: 142 MMOL/L (ref 136–145)
T4 FREE: 1.5 NG/DL (ref 0.9–1.7)
TOTAL PROTEIN: 7 G/DL (ref 6.6–8.7)
TRIGL SERPL-MCNC: 114 MG/DL (ref 0–149)
WBC # BLD: 9 K/UL (ref 4.8–10.8)

## 2019-06-06 ENCOUNTER — TELEPHONE (OUTPATIENT)
Dept: PRIMARY CARE CLINIC | Age: 74
End: 2019-06-06

## 2019-06-06 NOTE — TELEPHONE ENCOUNTER
----- Message from TORITO Rich sent at 6/5/2019  6:45 PM CDT -----  Microalbumin is improved from last check. Need to continue to ensure tight blood pressure and blood sugar control.

## 2019-06-06 NOTE — TELEPHONE ENCOUNTER
Called patient, spoke with: Patient regarding the results of the patients most recent labs. I advised Patient of Chana Castaneda recommendations. Patient did voice understanding. Pt is not taking potassium supplement, He will check his multivitamin to see if it have potassium in it. If so, he will hold it for now. He will return in 1 week for have the BMP, B12 and folate drawn. He will also let us know what urologist he wants us to refer him too.

## 2019-06-12 DIAGNOSIS — R71.8 RBC ABNORMALITY: ICD-10-CM

## 2019-06-12 DIAGNOSIS — E87.5 SERUM POTASSIUM ELEVATED: ICD-10-CM

## 2019-06-12 LAB
ANION GAP SERPL CALCULATED.3IONS-SCNC: 14 MMOL/L (ref 7–19)
BUN BLDV-MCNC: 38 MG/DL (ref 8–23)
CALCIUM SERPL-MCNC: 9.1 MG/DL (ref 8.8–10.2)
CHLORIDE BLD-SCNC: 109 MMOL/L (ref 98–111)
CO2: 21 MMOL/L (ref 22–29)
CREAT SERPL-MCNC: 2 MG/DL (ref 0.5–1.2)
FOLATE: >20 NG/ML (ref 4.5–32.2)
GFR NON-AFRICAN AMERICAN: 33
GLUCOSE BLD-MCNC: 107 MG/DL (ref 74–109)
POTASSIUM SERPL-SCNC: 5.3 MMOL/L (ref 3.5–5)
SODIUM BLD-SCNC: 144 MMOL/L (ref 136–145)
VITAMIN B-12: 685 PG/ML (ref 211–946)

## 2019-06-13 ENCOUNTER — TELEPHONE (OUTPATIENT)
Dept: PRIMARY CARE CLINIC | Age: 74
End: 2019-06-13

## 2019-06-24 LAB
ALBUMIN SERPL-MCNC: 4.2 G/DL (ref 3.5–5.2)
ALP BLD-CCNC: 79 U/L (ref 40–130)
ALT SERPL-CCNC: 17 U/L (ref 5–41)
ANION GAP SERPL CALCULATED.3IONS-SCNC: 13 MMOL/L (ref 7–19)
AST SERPL-CCNC: 16 U/L (ref 5–40)
BASOPHILS ABSOLUTE: 0 K/UL (ref 0–0.2)
BASOPHILS RELATIVE PERCENT: 0.4 % (ref 0–1)
BILIRUB SERPL-MCNC: 0.3 MG/DL (ref 0.2–1.2)
BUN BLDV-MCNC: 41 MG/DL (ref 8–23)
CALCIUM SERPL-MCNC: 9 MG/DL (ref 8.8–10.2)
CHLORIDE BLD-SCNC: 105 MMOL/L (ref 98–111)
CO2: 24 MMOL/L (ref 22–29)
CREAT SERPL-MCNC: 2 MG/DL (ref 0.5–1.2)
EOSINOPHILS ABSOLUTE: 0.4 K/UL (ref 0–0.6)
EOSINOPHILS RELATIVE PERCENT: 5.2 % (ref 0–5)
GFR NON-AFRICAN AMERICAN: 33
GLUCOSE BLD-MCNC: 110 MG/DL (ref 74–109)
HCT VFR BLD CALC: 39.4 % (ref 42–52)
HEMOGLOBIN: 12.8 G/DL (ref 14–18)
LYMPHOCYTES ABSOLUTE: 2.6 K/UL (ref 1.1–4.5)
LYMPHOCYTES RELATIVE PERCENT: 37.7 % (ref 20–40)
MCH RBC QN AUTO: 32.9 PG (ref 27–31)
MCHC RBC AUTO-ENTMCNC: 32.5 G/DL (ref 33–37)
MCV RBC AUTO: 101.3 FL (ref 80–94)
MONOCYTES ABSOLUTE: 0.7 K/UL (ref 0–0.9)
MONOCYTES RELATIVE PERCENT: 9.7 % (ref 0–10)
NEUTROPHILS ABSOLUTE: 3.2 K/UL (ref 1.5–7.5)
NEUTROPHILS RELATIVE PERCENT: 46.6 % (ref 50–65)
PDW BLD-RTO: 13.9 % (ref 11.5–14.5)
PLATELET # BLD: 218 K/UL (ref 130–400)
PMV BLD AUTO: 12.5 FL (ref 9.4–12.4)
POTASSIUM SERPL-SCNC: 4.5 MMOL/L (ref 3.5–5)
RBC # BLD: 3.89 M/UL (ref 4.7–6.1)
SODIUM BLD-SCNC: 142 MMOL/L (ref 136–145)
TOTAL PROTEIN: 6.7 G/DL (ref 6.6–8.7)
WBC # BLD: 6.9 K/UL (ref 4.8–10.8)

## 2019-06-27 RX ORDER — AMIODARONE HYDROCHLORIDE 200 MG/1
200 TABLET ORAL DAILY
Qty: 90 TABLET | Refills: 3 | Status: SHIPPED | OUTPATIENT
Start: 2019-06-27 | End: 2019-08-26 | Stop reason: SDUPTHER

## 2019-06-27 NOTE — TELEPHONE ENCOUNTER
Received fax from pharmacy requesting refill on pts medication(s). Pt was last seen in office on 6/4/2019  and has a follow up scheduled for 9/4/2019. Will send request to  Dr. Tereas Alonzo  for patient.      Requested Prescriptions     Pending Prescriptions Disp Refills    amiodarone (CORDARONE) 200 MG tablet [Pharmacy Med Name: AMIODARONE 200MG TABLETS] 90 tablet 3     Sig: TAKE 1 TABLET BY MOUTH DAILY

## 2019-07-22 LAB
24HR URINE VOLUME (ML): 3240 ML
ALBUMIN SERPL-MCNC: 4.5 G/DL (ref 3.5–5.2)
ANION GAP SERPL CALCULATED.3IONS-SCNC: 15 MMOL/L (ref 7–19)
BASOPHILS ABSOLUTE: 0 K/UL (ref 0–0.2)
BASOPHILS RELATIVE PERCENT: 0.5 % (ref 0–1)
BUN BLDV-MCNC: 40 MG/DL (ref 8–23)
CALCIUM SERPL-MCNC: 9.3 MG/DL (ref 8.8–10.2)
CHLORIDE BLD-SCNC: 106 MMOL/L (ref 98–111)
CO2: 22 MMOL/L (ref 22–29)
CREAT SERPL-MCNC: 2.2 MG/DL
CREAT SERPL-MCNC: 2.2 MG/DL (ref 0.5–1.2)
CREATININE 24 HOUR URINE: 0.9 G/24HR (ref 1–2)
CREATININE CLEARANCE: 25 ML/MIN (ref 71–151)
EOSINOPHILS ABSOLUTE: 0.3 K/UL (ref 0–0.6)
EOSINOPHILS RELATIVE PERCENT: 4.6 % (ref 0–5)
GFR NON-AFRICAN AMERICAN: 30
GLUCOSE BLD-MCNC: 117 MG/DL (ref 74–109)
HCT VFR BLD CALC: 41.1 % (ref 42–52)
HEMOGLOBIN: 12.9 G/DL (ref 14–18)
LYMPHOCYTES ABSOLUTE: 2.7 K/UL (ref 1.1–4.5)
LYMPHOCYTES RELATIVE PERCENT: 42.9 % (ref 20–40)
Lab: 24 HR
MCH RBC QN AUTO: 31.6 PG (ref 27–31)
MCHC RBC AUTO-ENTMCNC: 31.4 G/DL (ref 33–37)
MCV RBC AUTO: 100.7 FL (ref 80–94)
MONOCYTES ABSOLUTE: 0.5 K/UL (ref 0–0.9)
MONOCYTES RELATIVE PERCENT: 8.4 % (ref 0–10)
NEUTROPHILS ABSOLUTE: 2.7 K/UL (ref 1.5–7.5)
NEUTROPHILS RELATIVE PERCENT: 42.5 % (ref 50–65)
PARATHYROID HORMONE INTACT: 59.3 PG/ML (ref 15–65)
PDW BLD-RTO: 14.1 % (ref 11.5–14.5)
PHOSPHORUS: 4.5 MG/DL (ref 2.5–4.5)
PLATELET # BLD: 224 K/UL (ref 130–400)
PMV BLD AUTO: 12.1 FL (ref 9.4–12.4)
POTASSIUM SERPL-SCNC: 5 MMOL/L (ref 3.5–5)
PROTEIN 24 HOUR URINE: 130 MG/24HR (ref 50–100)
RBC # BLD: 4.08 M/UL (ref 4.7–6.1)
SODIUM BLD-SCNC: 143 MMOL/L (ref 136–145)
VITAMIN D 25-HYDROXY: 47 NG/ML
WBC # BLD: 6.3 K/UL (ref 4.8–10.8)

## 2019-07-25 ENCOUNTER — TELEPHONE (OUTPATIENT)
Dept: PRIMARY CARE CLINIC | Age: 74
End: 2019-07-25

## 2019-07-25 DIAGNOSIS — K59.00 CONSTIPATION, UNSPECIFIED CONSTIPATION TYPE: ICD-10-CM

## 2019-07-25 DIAGNOSIS — K59.89: Primary | ICD-10-CM

## 2019-08-08 ENCOUNTER — TELEPHONE (OUTPATIENT)
Dept: PRIMARY CARE CLINIC | Age: 74
End: 2019-08-08

## 2019-08-23 ENCOUNTER — TELEPHONE (OUTPATIENT)
Dept: PRIMARY CARE CLINIC | Age: 74
End: 2019-08-23

## 2019-08-26 RX ORDER — AMIODARONE HYDROCHLORIDE 200 MG/1
200 TABLET ORAL DAILY
Qty: 90 TABLET | Refills: 3 | Status: SHIPPED | OUTPATIENT
Start: 2019-08-26

## 2019-09-12 ENCOUNTER — OFFICE VISIT (OUTPATIENT)
Dept: PRIMARY CARE CLINIC | Age: 74
End: 2019-09-12
Payer: MEDICARE

## 2019-09-12 VITALS
WEIGHT: 164.75 LBS | SYSTOLIC BLOOD PRESSURE: 120 MMHG | OXYGEN SATURATION: 99 % | HEIGHT: 72 IN | TEMPERATURE: 98.1 F | HEART RATE: 50 BPM | DIASTOLIC BLOOD PRESSURE: 84 MMHG | BODY MASS INDEX: 22.31 KG/M2

## 2019-09-12 DIAGNOSIS — I99.9 VASCULAR DISEASE: ICD-10-CM

## 2019-09-12 DIAGNOSIS — N18.4 CHRONIC KIDNEY DISEASE (CKD), STAGE IV (SEVERE) (HCC): Primary | ICD-10-CM

## 2019-09-12 DIAGNOSIS — E78.2 MIXED HYPERLIPIDEMIA: ICD-10-CM

## 2019-09-12 DIAGNOSIS — Z86.79 PERSONAL HISTORY OF ATRIAL FLUTTER: ICD-10-CM

## 2019-09-12 DIAGNOSIS — F17.200 SMOKING: ICD-10-CM

## 2019-09-12 PROBLEM — D68.9 COAGULOPATHY (HCC): Status: RESOLVED | Noted: 2018-06-11 | Resolved: 2019-09-12

## 2019-09-12 PROBLEM — L98.492: Status: RESOLVED | Noted: 2018-12-04 | Resolved: 2019-09-12

## 2019-09-12 PROBLEM — I96 GANGRENE OF FOOT (HCC): Status: RESOLVED | Noted: 2018-08-13 | Resolved: 2019-09-12

## 2019-09-12 PROBLEM — S31.829A OPEN WOUND OF LEFT BUTTOCK: Chronic | Status: RESOLVED | Noted: 2018-10-01 | Resolved: 2019-09-12

## 2019-09-12 PROBLEM — D62 ACUTE BLOOD LOSS ANEMIA: Status: RESOLVED | Noted: 2018-06-11 | Resolved: 2019-09-12

## 2019-09-12 PROCEDURE — G8599 NO ASA/ANTIPLAT THER USE RNG: HCPCS | Performed by: PEDIATRICS

## 2019-09-12 PROCEDURE — 1036F TOBACCO NON-USER: CPT | Performed by: PEDIATRICS

## 2019-09-12 PROCEDURE — 99214 OFFICE O/P EST MOD 30 MIN: CPT | Performed by: PEDIATRICS

## 2019-09-12 PROCEDURE — 3017F COLORECTAL CA SCREEN DOC REV: CPT | Performed by: PEDIATRICS

## 2019-09-12 PROCEDURE — 4040F PNEUMOC VAC/ADMIN/RCVD: CPT | Performed by: PEDIATRICS

## 2019-09-12 PROCEDURE — G8427 DOCREV CUR MEDS BY ELIG CLIN: HCPCS | Performed by: PEDIATRICS

## 2019-09-12 PROCEDURE — G8420 CALC BMI NORM PARAMETERS: HCPCS | Performed by: PEDIATRICS

## 2019-09-12 PROCEDURE — 1123F ACP DISCUSS/DSCN MKR DOCD: CPT | Performed by: PEDIATRICS

## 2019-09-12 ASSESSMENT — ENCOUNTER SYMPTOMS
SINUS PRESSURE: 0
COUGH: 0
VOICE CHANGE: 0
VOMITING: 0
RHINORRHEA: 0
SHORTNESS OF BREATH: 0
ALLERGIC/IMMUNOLOGIC NEGATIVE: 1
BACK PAIN: 1
SORE THROAT: 0
NAUSEA: 0
CHEST TIGHTNESS: 0
DIARRHEA: 0
ABDOMINAL PAIN: 0
WHEEZING: 0
CONSTIPATION: 1
TROUBLE SWALLOWING: 0

## 2019-09-12 NOTE — PROGRESS NOTES
is 22.19 kg/m².     I have reviewed the following with the Mr. Brock Higgins   Lab Review  Orders Only on 07/22/2019   Component Date Value    CREATININE 07/22/2019 2.2     Time (Hours) 07/22/2019 24     Creatinine Clearance 07/22/2019 25*   Orders Only on 07/22/2019   Component Date Value    24hr Urine Volume (ml) 07/22/2019 3240     Creatinine, 24H Ur 07/22/2019 0.9*    Protein, 24H Urine 07/22/2019 130*   Orders Only on 07/22/2019   Component Date Value    Vit D, 25-Hydroxy 07/22/2019 47.0    Orders Only on 07/22/2019   Component Date Value    PTH 07/22/2019 59.3    Orders Only on 07/22/2019   Component Date Value    Sodium 07/22/2019 143     Potassium 07/22/2019 5.0     Chloride 07/22/2019 106     CO2 07/22/2019 22     Anion Gap 07/22/2019 15     Glucose 07/22/2019 117*    BUN 07/22/2019 40*    CREATININE 07/22/2019 2.2*    GFR Non- 07/22/2019 30*    Calcium 07/22/2019 9.3     Phosphorus 07/22/2019 4.5     Alb 07/22/2019 4.5    Orders Only on 07/22/2019   Component Date Value    WBC 07/22/2019 6.3     RBC 07/22/2019 4.08*    Hemoglobin 07/22/2019 12.9*    Hematocrit 07/22/2019 41.1*    MCV 07/22/2019 100.7*    MCH 07/22/2019 31.6*    MCHC 07/22/2019 31.4*    RDW 07/22/2019 14.1     Platelets 06/70/5843 224     MPV 07/22/2019 12.1     Neutrophils % 07/22/2019 42.5*    Lymphocytes % 07/22/2019 42.9*    Monocytes % 07/22/2019 8.4     Eosinophils % 07/22/2019 4.6     Basophils % 07/22/2019 0.5     Neutrophils Absolute 07/22/2019 2.7     Lymphocytes Absolute 07/22/2019 2.7     Monocytes Absolute 07/22/2019 0.50     Eosinophils Absolute 07/22/2019 0.30     Basophils Absolute 07/22/2019 0.00    Orders Only on 06/24/2019   Component Date Value    Sodium 06/24/2019 142     Potassium 06/24/2019 4.5     Chloride 06/24/2019 105     CO2 06/24/2019 24     Anion Gap 06/24/2019 13     Glucose 06/24/2019 110*    BUN 06/24/2019 41*    CREATININE 06/24/2019 2.0*    GFR

## 2019-10-15 DIAGNOSIS — E78.2 MIXED HYPERLIPIDEMIA: ICD-10-CM

## 2019-10-15 LAB
CHOLESTEROL, TOTAL: 246 MG/DL (ref 160–199)
HDLC SERPL-MCNC: 57 MG/DL (ref 55–121)
LDL CHOLESTEROL CALCULATED: 166 MG/DL
TRIGL SERPL-MCNC: 117 MG/DL (ref 0–149)

## 2019-10-17 ENCOUNTER — TELEPHONE (OUTPATIENT)
Dept: PRIMARY CARE CLINIC | Age: 74
End: 2019-10-17

## 2019-11-01 LAB
ALBUMIN SERPL-MCNC: 4.6 G/DL (ref 3.5–5.2)
ALP BLD-CCNC: 84 U/L (ref 40–130)
ALT SERPL-CCNC: 20 U/L (ref 5–41)
ANION GAP SERPL CALCULATED.3IONS-SCNC: 14 MMOL/L (ref 7–19)
AST SERPL-CCNC: 22 U/L (ref 5–40)
BASOPHILS ABSOLUTE: 0.1 K/UL (ref 0–0.2)
BASOPHILS RELATIVE PERCENT: 0.5 % (ref 0–1)
BILIRUB SERPL-MCNC: <0.2 MG/DL (ref 0.2–1.2)
BUN BLDV-MCNC: 38 MG/DL (ref 8–23)
CALCIUM SERPL-MCNC: 9.3 MG/DL (ref 8.8–10.2)
CHLORIDE BLD-SCNC: 107 MMOL/L (ref 98–111)
CO2: 22 MMOL/L (ref 22–29)
CREAT SERPL-MCNC: 2 MG/DL (ref 0.5–1.2)
EOSINOPHILS ABSOLUTE: 0.3 K/UL (ref 0–0.6)
EOSINOPHILS RELATIVE PERCENT: 3.2 % (ref 0–5)
GFR NON-AFRICAN AMERICAN: 33
GLUCOSE BLD-MCNC: 106 MG/DL (ref 74–109)
HCT VFR BLD CALC: 41.9 % (ref 42–52)
HEMOGLOBIN: 13 G/DL (ref 14–18)
IMMATURE GRANULOCYTES #: 0 K/UL
LYMPHOCYTES ABSOLUTE: 2.3 K/UL (ref 1.1–4.5)
LYMPHOCYTES RELATIVE PERCENT: 22.3 % (ref 20–40)
MCH RBC QN AUTO: 31.9 PG (ref 27–31)
MCHC RBC AUTO-ENTMCNC: 31 G/DL (ref 33–37)
MCV RBC AUTO: 102.9 FL (ref 80–94)
MONOCYTES ABSOLUTE: 1 K/UL (ref 0–0.9)
MONOCYTES RELATIVE PERCENT: 9.5 % (ref 0–10)
NEUTROPHILS ABSOLUTE: 6.5 K/UL (ref 1.5–7.5)
NEUTROPHILS RELATIVE PERCENT: 64.1 % (ref 50–65)
PDW BLD-RTO: 14.3 % (ref 11.5–14.5)
PLATELET # BLD: 237 K/UL (ref 130–400)
PMV BLD AUTO: 12.6 FL (ref 9.4–12.4)
POTASSIUM SERPL-SCNC: 5.8 MMOL/L (ref 3.5–5)
RBC # BLD: 4.07 M/UL (ref 4.7–6.1)
SODIUM BLD-SCNC: 143 MMOL/L (ref 136–145)
TOTAL PROTEIN: 6.7 G/DL (ref 6.6–8.7)
WBC # BLD: 10.1 K/UL (ref 4.8–10.8)

## 2020-03-25 PROBLEM — D64.9 ANEMIA: Status: RESOLVED | Noted: 2020-03-25 | Resolved: 2020-03-24

## 2020-08-19 NOTE — CARE COORDINATION
6/26/18: HD set-up in progress; awaiting chair time.   Electronically signed by VIRGIL Parrish on 6/26/2018 at 10:11 AM Erinr completed dual skin assessment with Kezia TAY. Writer agrees with assessment.

## 2020-11-04 ENCOUNTER — TELEPHONE (OUTPATIENT)
Dept: PRIMARY CARE CLINIC | Age: 75
End: 2020-11-04

## (undated) DEVICE — CATHETER,URETHRAL,REDRUBBER,STRL,16FR: Brand: MEDLINE

## (undated) DEVICE — SUTURE PERMA-HAND SZ 2-0 L30IN NONABSORBABLE BLK L26MM SH K833H

## (undated) DEVICE — SEALER ENDOSCP NANO COAT OPN DIV CRV L JAW LIGASURE IMPACT

## (undated) DEVICE — SUTURE MCRYL SZ 4-0 L18IN ABSRB UD L19MM PS-2 3/8 CIR PRIM Y496G

## (undated) DEVICE — GAUZE,SPONGE,FLUFF,6"X6.75",STRL,10/TRAY: Brand: MEDLINE

## (undated) DEVICE — BLANKET THER AD W24XL60IN FAB COVERING SUP SFT ULT THN LTWT

## (undated) DEVICE — COVER,TABLE,44X90,STERILE: Brand: MEDLINE

## (undated) DEVICE — SUTURE ETHLN SZ 4-0 L18IN NONABSORBABLE BLK L19MM PS-2 3/8 1667H

## (undated) DEVICE — SURGIFOAM SPNG SZ 100

## (undated) DEVICE — KIT CATH L11.5CM DIA9FR CTRL VEN POLYUR ANTIMIC COAT DBL

## (undated) DEVICE — SUTURE PDS + SZ 1 L96IN ABSRB VLT L65MM TP-1 1/2 CIR PDP880G

## (undated) DEVICE — 3M™ TEGADERM™ TRANSPARENT FILM DRESSING FRAME STYLE, 1626W, 4 IN X 4-3/4 IN (10 CM X 12 CM), 50/CT 4CT/CASE: Brand: 3M™ TEGADERM™

## (undated) DEVICE — VI-DRAPE ISOLATION BAG: Brand: CONVERTORS

## (undated) DEVICE — THREE QUARTER SHEET: Brand: CONVERTORS

## (undated) DEVICE — ACT-LOW RANGE CUVETTES - 45 CUVETTES/BOX.: Brand: HEMOCHRON WHOLE BLOOD MICROCOAGULATION SYSTEM

## (undated) DEVICE — SOLUTION IV IRRIG POUR BRL 0.9% SODIUM CHL 2F7124

## (undated) DEVICE — SUTURE PROL SZ 3-0 L48IN NONABSORBABLE BLU SH L26MM 1/2 CIR 8534H

## (undated) DEVICE — AGENT HEMSTAT W4XL8IN OXIDIZED REGENERATED CELOS ABSRB

## (undated) DEVICE — KIT URIN CATHETER 16 FR 5 CC M INDWL SIL

## (undated) DEVICE — DRESSING FOAM W4XL12IN SIL RECT ADH WTRPRF FLM BK W/ BORD

## (undated) DEVICE — CATHETER ADAPTER: Brand: ADDTO

## (undated) DEVICE — COVER,MAYO STAND,STERILE: Brand: MEDLINE

## (undated) DEVICE — Z INACTIVE USE 2535480 CLIP LIG M BLU TI HRT SHP WIRE HORZ 180 PER BX

## (undated) DEVICE — PLEDGET SURG W0.375XL0.062IN THK1.5MM WHT SFT PTFE RECT

## (undated) DEVICE — SUTURE PERMAHAND SZ 2-0 L30IN NONABSORBABLE BLK SH L26MM C016D

## (undated) DEVICE — SURGICAL PROCEDURE PACK VASC LOURDES HOSP

## (undated) DEVICE — Z DISCONTINUED PER MEDLINE USE 2718072 DRESSING FOAM W5XL12.5CM SIL ADH THN BORDED CNFRM LO PROF

## (undated) DEVICE — RADIFOCUS GLIDEWIRE ADVANTAGE GUIDEWIRE: Brand: GLIDEWIRE ADVANTAGE

## (undated) DEVICE — Z INACTIVE USE 2641838 CLIP INT L ORNG TI TRNSVRS GRV CHEVRON SHP W/ PRECIS TIP TO

## (undated) DEVICE — WRAP COMPR W4INXL5YD NONSTERILE TAN SELF ADH COBAN

## (undated) DEVICE — TOWEL SURG W17XL24IN WHT COT RADPQ DISPOSABLE ST 4 PER PK

## (undated) DEVICE — STAPLER SKIN L39MM DIA0.53MM CRWN 5.7MM S STL FIX HD PROX

## (undated) DEVICE — SUTURE VCRL SZ 3-0 L36IN ABSRB UD L36MM CT-1 1/2 CIR J944H

## (undated) DEVICE — SOLUTION IV 1000ML 0.9% SOD CHL PH 5 INJ USP VIAFLX PLAS

## (undated) DEVICE — ACCESSORY TOWEL PACK: Brand: MEDLINE INDUSTRIES, INC.

## (undated) DEVICE — ASTOUND STANDARD SURGICAL GOWN, XXL: Brand: CONVERTORS

## (undated) DEVICE — SUTURE PROL SZ 6-0 L24IN NONABSORBABLE BLU L13MM C-1 3/8 8726H

## (undated) DEVICE — TRAY PREP DRY W/ PREM GLV 2 APPL 6 SPNG 2 UNDPD 1 OVERWRAP

## (undated) DEVICE — TOWEL,OR,DSP,ST,BLUE,DLX,4/PK,20PK/CS: Brand: MEDLINE

## (undated) DEVICE — CLIP INT SM WIDE RED TI TRNSVRS GRV CHEVRON SHP W/ PRECIS

## (undated) DEVICE — ADHESIVE SKIN CLSR 0.7ML TOP DERMBND ADV

## (undated) DEVICE — MINOR CDS: Brand: MEDLINE INDUSTRIES, INC.

## (undated) DEVICE — Device

## (undated) DEVICE — SUTURE PERMAHAND SZ 2-0 L30IN NONABSORBABLE BLK SILK W/O A305H

## (undated) DEVICE — DRAPE,EXTREMITY,89X128,STERILE: Brand: MEDLINE

## (undated) DEVICE — ENDO KIT,LOURDES HOSPITAL: Brand: MEDLINE INDUSTRIES, INC.

## (undated) DEVICE — GLOVE SURG SZ 75 CRM LTX FREE POLYISOPRENE POLYMER BEAD ANTI

## (undated) DEVICE — SUTURE ABSORBABLE BRAIDED 0 CT-1 8X27 IN UD VICRYL JJ41G

## (undated) DEVICE — GLOVE SURG SZ 75 L12IN FNGR THK94MIL TRNSLUC YEL LTX

## (undated) DEVICE — EQUISTREAM XK HEMODIALYSIS CATH W/STYLET, ST, AIRGUARD, 16 FR. 27CM: Brand: EQUISTREAM XK LONG-TERM HEMODIALYSIS CATHETER WITH PRELOADED STYLET

## (undated) DEVICE — DRESSING PETRO W3XL3IN OIL EMUL N ADH GZ KNIT IMPREG CELOS

## (undated) DEVICE — POOLE SUCTION INSTRUMENT WITH REMOVABLE SHEATH: Brand: POOLE

## (undated) DEVICE — BANDAGE GZ W45INXL4 1 10YD FLUF RL 6 PLY DERMACEA

## (undated) DEVICE — UNIV MI, 5 FR, 7CM, ANL,: Brand: MICROEZ INTRODUCER

## (undated) DEVICE — CAUTERY TIP POLISHER: Brand: DEVON

## (undated) DEVICE — Device: Brand: LEVEL 1

## (undated) DEVICE — SUTURE VCRL CTRL REL 2-0 CTX 18IN ABSRB BRAID UD J723D

## (undated) DEVICE — ENCORE® LATEX ORTHO SIZE 8.5, STERILE LATEX POWDER-FREE SURGICAL GLOVE: Brand: ENCORE

## (undated) DEVICE — DRAPE ISOLATN BG 18X18IN DISPOSABLE

## (undated) DEVICE — SUTURE PROL SZ 5-0 L36IN NONABSORBABLE BLU L17MM RB-1 1/2 8556H

## (undated) DEVICE — STAPLE REMOVAL TRAY KIT: Brand: CURITY

## (undated) DEVICE — CLIP INT M L GRN TI TRNSVRS GRV CHEVRON SHP W/ PRECIS TIP

## (undated) DEVICE — SUTURE PERMAHAND SZ 3-0 L18IN NONABSORBABLE BLK L26MM SH C013D

## (undated) DEVICE — SUTURE PROL SZ 1 L60IN NONABSORBABLE BLU L65MM TP-1 3/8 CIR 8824G

## (undated) DEVICE — SPONGE LAP ST XRAY 18X18

## (undated) DEVICE — E-Z CLEAN, NON-STICK, PTFE COATED, ELECTROSURGICAL BLADE ELECTRODE, 6.5 INCH (16.5 CM): Brand: MEGADYNE

## (undated) DEVICE — CATH URETH 18FR PVC RND TIP 2

## (undated) DEVICE — 3M™ IOBAN™ 2 ANTIMICROBIAL INCISE DRAPE 6651EZ: Brand: IOBAN™ 2

## (undated) DEVICE — SUTURE ABSORBABLE MONOFILAMENT 3-0 SH 27 IN UD PDS + PDP416H

## (undated) DEVICE — SUTURE PDS + SZ 2 0 L27IN ABSRB VLT L36MM CT 1 1 2 CIR PDP339H

## (undated) DEVICE — SUTURE PERMAHAND SZ 2-0 L18IN NONABSORBABLE BLK L26MM SH C012D

## (undated) DEVICE — SUTURE ETHLN SZ 3-0 L18IN NONABSORBABLE BLK FS-1 L24MM 3/8 663H

## (undated) DEVICE — SOLUTION IV 500ML 0.9% SOD CHL PH 5 INJ USP VIAFLX PLAS

## (undated) DEVICE — DRESSING GRMCDL 6 12FR D1N CNTR HOLE 4MM ANTMCRBL PRTCTVE DI

## (undated) DEVICE — SUTURE PROL SZ 4-0 L36IN NONABSORBABLE BLU L26MM SH 1/2 CIR 8521H

## (undated) DEVICE — SUTURE PROL SZ 7-0 L24IN NONABSORBABLE BLU L9.3MM BV-1 3/8 M8702

## (undated) DEVICE — SUTURE PERMAHAND SZ 3-0 L30IN NONABSORBABLE BLK SILK BRAID A304H

## (undated) DEVICE — SUTURE PERMAHAND SZ 4-0 L12X30IN NONABSORBABLE BLK SILK A303H